# Patient Record
Sex: MALE | Race: BLACK OR AFRICAN AMERICAN | NOT HISPANIC OR LATINO | Employment: OTHER | URBAN - METROPOLITAN AREA
[De-identification: names, ages, dates, MRNs, and addresses within clinical notes are randomized per-mention and may not be internally consistent; named-entity substitution may affect disease eponyms.]

---

## 2017-01-01 ENCOUNTER — HOSPITAL ENCOUNTER (INPATIENT)
Facility: HOSPITAL | Age: 67
LOS: 6 days | DRG: 252 | End: 2017-12-06
Attending: EMERGENCY MEDICINE | Admitting: INTERNAL MEDICINE
Payer: COMMERCIAL

## 2017-01-01 ENCOUNTER — ANESTHESIA EVENT (OUTPATIENT)
Dept: SURGERY | Facility: HOSPITAL | Age: 67
DRG: 252 | End: 2017-01-01
Payer: COMMERCIAL

## 2017-01-01 ENCOUNTER — TELEPHONE (OUTPATIENT)
Dept: CARDIOLOGY | Facility: CLINIC | Age: 67
End: 2017-01-01

## 2017-01-01 ENCOUNTER — OFFICE VISIT (OUTPATIENT)
Dept: CARDIOLOGY | Facility: CLINIC | Age: 67
End: 2017-01-01
Payer: COMMERCIAL

## 2017-01-01 ENCOUNTER — ANESTHESIA (OUTPATIENT)
Dept: SURGERY | Facility: HOSPITAL | Age: 67
DRG: 252 | End: 2017-01-01
Payer: COMMERCIAL

## 2017-01-01 ENCOUNTER — HOSPITAL ENCOUNTER (OUTPATIENT)
Dept: RADIOLOGY | Facility: HOSPITAL | Age: 67
Discharge: HOME | DRG: 252 | End: 2017-11-28
Attending: INTERNAL MEDICINE
Payer: MEDICARE

## 2017-01-01 ENCOUNTER — OFFICE VISIT (OUTPATIENT)
Dept: CARDIOLOGY | Facility: CLINIC | Age: 67
End: 2017-01-01
Payer: MEDICARE

## 2017-01-01 ENCOUNTER — ANESTHESIA EVENT (OUTPATIENT)
Dept: INTENSIVE CARE | Facility: HOSPITAL | Age: 67
DRG: 252 | End: 2017-01-01
Payer: COMMERCIAL

## 2017-01-01 ENCOUNTER — ANESTHESIA (OUTPATIENT)
Dept: INTENSIVE CARE | Facility: HOSPITAL | Age: 67
DRG: 252 | End: 2017-01-01
Payer: COMMERCIAL

## 2017-01-01 ENCOUNTER — HOSPITAL ENCOUNTER (INPATIENT)
Facility: HOSPITAL | Age: 67
LOS: 2 days | Discharge: HOME | DRG: 250 | End: 2017-03-09
Attending: HOSPITALIST | Admitting: HOSPITALIST
Payer: COMMERCIAL

## 2017-01-01 ENCOUNTER — HOSPITAL ENCOUNTER (OUTPATIENT)
Facility: HOSPITAL | Age: 67
Discharge: HOME | End: 2017-07-18
Attending: EMERGENCY MEDICINE | Admitting: INTERNAL MEDICINE
Payer: MEDICARE

## 2017-01-01 ENCOUNTER — HOSPITAL ENCOUNTER (OUTPATIENT)
Dept: RADIOLOGY | Facility: HOSPITAL | Age: 67
Discharge: HOME | DRG: 252 | End: 2017-11-28
Attending: INTERNAL MEDICINE
Payer: COMMERCIAL

## 2017-01-01 ENCOUNTER — HOSPITAL ENCOUNTER (INPATIENT)
Facility: HOSPITAL | Age: 67
LOS: 1 days | Discharge: HOME | DRG: 291 | End: 2017-04-19
Attending: INTERNAL MEDICINE | Admitting: INTERNAL MEDICINE
Payer: MEDICARE

## 2017-01-01 ENCOUNTER — HOSPITAL ENCOUNTER (INPATIENT)
Facility: HOSPITAL | Age: 67
LOS: 3 days | Discharge: HOME HEALTH | DRG: 291 | End: 2017-09-17
Attending: EMERGENCY MEDICINE | Admitting: FAMILY MEDICINE
Payer: MEDICARE

## 2017-01-01 VITALS
OXYGEN SATURATION: 100 % | BODY MASS INDEX: 23.7 KG/M2 | SYSTOLIC BLOOD PRESSURE: 170 MMHG | RESPIRATION RATE: 18 BRPM | DIASTOLIC BLOOD PRESSURE: 89 MMHG | TEMPERATURE: 98 F | HEIGHT: 71 IN | WEIGHT: 169.31 LBS | HEART RATE: 63 BPM

## 2017-01-01 VITALS
SYSTOLIC BLOOD PRESSURE: 170 MMHG | WEIGHT: 170 LBS | DIASTOLIC BLOOD PRESSURE: 70 MMHG | BODY MASS INDEX: 23.71 KG/M2 | HEART RATE: 94 BPM

## 2017-01-01 VITALS
DIASTOLIC BLOOD PRESSURE: 35 MMHG | WEIGHT: 154.31 LBS | HEART RATE: 48 BPM | RESPIRATION RATE: 19 BRPM | TEMPERATURE: 98 F | HEIGHT: 71 IN | OXYGEN SATURATION: 100 % | SYSTOLIC BLOOD PRESSURE: 60 MMHG | BODY MASS INDEX: 21.6 KG/M2

## 2017-01-01 VITALS
DIASTOLIC BLOOD PRESSURE: 58 MMHG | OXYGEN SATURATION: 96 % | HEART RATE: 62 BPM | SYSTOLIC BLOOD PRESSURE: 129 MMHG | HEIGHT: 71 IN | BODY MASS INDEX: 22.7 KG/M2 | RESPIRATION RATE: 20 BRPM | TEMPERATURE: 99 F | WEIGHT: 162.13 LBS

## 2017-01-01 VITALS
WEIGHT: 167.13 LBS | DIASTOLIC BLOOD PRESSURE: 54 MMHG | TEMPERATURE: 99 F | SYSTOLIC BLOOD PRESSURE: 99 MMHG | BODY MASS INDEX: 23.4 KG/M2 | HEART RATE: 58 BPM | RESPIRATION RATE: 18 BRPM | OXYGEN SATURATION: 93 % | HEIGHT: 71 IN

## 2017-01-01 VITALS
BODY MASS INDEX: 23.4 KG/M2 | HEART RATE: 58 BPM | HEIGHT: 71 IN | OXYGEN SATURATION: 98 % | DIASTOLIC BLOOD PRESSURE: 50 MMHG | SYSTOLIC BLOOD PRESSURE: 128 MMHG | TEMPERATURE: 98 F | RESPIRATION RATE: 16 BRPM | WEIGHT: 167.13 LBS

## 2017-01-01 VITALS
DIASTOLIC BLOOD PRESSURE: 85 MMHG | SYSTOLIC BLOOD PRESSURE: 130 MMHG | OXYGEN SATURATION: 99 % | BODY MASS INDEX: 21.98 KG/M2 | WEIGHT: 157 LBS | HEART RATE: 84 BPM | HEIGHT: 71 IN

## 2017-01-01 DIAGNOSIS — E87.70 VOLUME OVERLOAD: ICD-10-CM

## 2017-01-01 DIAGNOSIS — E87.5 SERUM POTASSIUM ELEVATED: ICD-10-CM

## 2017-01-01 DIAGNOSIS — T82.49XD CLOTTED DIALYSIS ACCESS, SUBSEQUENT ENCOUNTER: ICD-10-CM

## 2017-01-01 DIAGNOSIS — E87.5 HYPERKALEMIA: Primary | ICD-10-CM

## 2017-01-01 DIAGNOSIS — Z91.199 PERSONAL HISTORY OF NONCOMPLIANCE WITH MEDICAL TREATMENT, PRESENTING HAZARDS TO HEALTH: ICD-10-CM

## 2017-01-01 DIAGNOSIS — I73.9 PAD (PERIPHERAL ARTERY DISEASE): ICD-10-CM

## 2017-01-01 DIAGNOSIS — Z99.2 ESRD (END STAGE RENAL DISEASE) ON DIALYSIS: ICD-10-CM

## 2017-01-01 DIAGNOSIS — D63.8 ANEMIA IN OTHER CHRONIC DISEASES CLASSIFIED ELSEWHERE: ICD-10-CM

## 2017-01-01 DIAGNOSIS — E11.29 DM (DIABETES MELLITUS) TYPE II CONTROLLED WITH RENAL MANIFESTATION: ICD-10-CM

## 2017-01-01 DIAGNOSIS — R53.1 WEAKNESS: ICD-10-CM

## 2017-01-01 DIAGNOSIS — I27.20 PULMONARY HYPERTENSION: ICD-10-CM

## 2017-01-01 DIAGNOSIS — Z99.2 ESRD ON HEMODIALYSIS: ICD-10-CM

## 2017-01-01 DIAGNOSIS — E07.9 THYROID DISEASE: ICD-10-CM

## 2017-01-01 DIAGNOSIS — I25.10 CORONARY ARTERY DISEASE INVOLVING NATIVE CORONARY ARTERY OF NATIVE HEART WITHOUT ANGINA PECTORIS: ICD-10-CM

## 2017-01-01 DIAGNOSIS — Z99.2 END STAGE RENAL DISEASE ON DIALYSIS: ICD-10-CM

## 2017-01-01 DIAGNOSIS — I67.9 CEREBROVASCULAR DISEASE: ICD-10-CM

## 2017-01-01 DIAGNOSIS — N18.6 ESRD ON HEMODIALYSIS: ICD-10-CM

## 2017-01-01 DIAGNOSIS — I16.1 HYPERTENSIVE EMERGENCY: ICD-10-CM

## 2017-01-01 DIAGNOSIS — I34.0 MITRAL VALVE INSUFFICIENCY, UNSPECIFIED ETIOLOGY: Primary | ICD-10-CM

## 2017-01-01 DIAGNOSIS — I10 ESSENTIAL HYPERTENSION: ICD-10-CM

## 2017-01-01 DIAGNOSIS — N18.6 ESRD (END STAGE RENAL DISEASE) ON DIALYSIS: ICD-10-CM

## 2017-01-01 DIAGNOSIS — I34.0 NON-RHEUMATIC MITRAL REGURGITATION: ICD-10-CM

## 2017-01-01 DIAGNOSIS — I21.4 NSTEMI (NON-ST ELEVATED MYOCARDIAL INFARCTION): ICD-10-CM

## 2017-01-01 DIAGNOSIS — E78.5 HYPERLIPIDEMIA LDL GOAL <70: ICD-10-CM

## 2017-01-01 DIAGNOSIS — R73.9 HYPERGLYCEMIA WITHOUT KETOSIS: ICD-10-CM

## 2017-01-01 DIAGNOSIS — Z95.2 MITRAL VALVE REPLACED: ICD-10-CM

## 2017-01-01 DIAGNOSIS — M53.3 TAIL BONE PAIN: ICD-10-CM

## 2017-01-01 DIAGNOSIS — Z01.818 PREOP EXAMINATION: Primary | ICD-10-CM

## 2017-01-01 DIAGNOSIS — T82.49XA CLOTTED DIALYSIS ACCESS: ICD-10-CM

## 2017-01-01 DIAGNOSIS — Z91.199 PERSONAL HISTORY OF NONCOMPLIANCE WITH MEDICAL TREATMENT, PRESENTING HAZARDS TO HEALTH: Primary | ICD-10-CM

## 2017-01-01 DIAGNOSIS — I25.810 CORONARY ARTERY DISEASE INVOLVING CORONARY BYPASS GRAFT OF NATIVE HEART WITHOUT ANGINA PECTORIS: ICD-10-CM

## 2017-01-01 DIAGNOSIS — N18.6 ESRD ON HEMODIALYSIS: Primary | ICD-10-CM

## 2017-01-01 DIAGNOSIS — E87.70 HYPERVOLEMIA, UNSPECIFIED HYPERVOLEMIA TYPE: ICD-10-CM

## 2017-01-01 DIAGNOSIS — I50.23 ACUTE ON CHRONIC SYSTOLIC HEART FAILURE: ICD-10-CM

## 2017-01-01 DIAGNOSIS — I25.810 CORONARY ARTERY DISEASE INVOLVING CORONARY BYPASS GRAFT OF NATIVE HEART WITHOUT ANGINA PECTORIS: Primary | ICD-10-CM

## 2017-01-01 DIAGNOSIS — S80.812A ABRASION OF LEFT LOWER EXTREMITY, INITIAL ENCOUNTER: ICD-10-CM

## 2017-01-01 DIAGNOSIS — E03.9 ACQUIRED HYPOTHYROIDISM: ICD-10-CM

## 2017-01-01 DIAGNOSIS — N18.6 END STAGE RENAL DISEASE ON DIALYSIS: ICD-10-CM

## 2017-01-01 DIAGNOSIS — I50.9 CHF (CONGESTIVE HEART FAILURE): ICD-10-CM

## 2017-01-01 DIAGNOSIS — R50.9 FEBRILE ILLNESS: ICD-10-CM

## 2017-01-01 DIAGNOSIS — T82.41XA HEMODIALYSIS CATHETER MALFUNCTION, INITIAL ENCOUNTER: ICD-10-CM

## 2017-01-01 DIAGNOSIS — R79.89 ELEVATED TROPONIN: ICD-10-CM

## 2017-01-01 DIAGNOSIS — E87.70 HYPERVOLEMIA, UNSPECIFIED HYPERVOLEMIA TYPE: Primary | ICD-10-CM

## 2017-01-01 DIAGNOSIS — E87.70 FLUID OVERLOAD: ICD-10-CM

## 2017-01-01 DIAGNOSIS — R60.9 SWELLING: ICD-10-CM

## 2017-01-01 DIAGNOSIS — I25.10 CAD (CORONARY ARTERY DISEASE): ICD-10-CM

## 2017-01-01 DIAGNOSIS — Z99.2 ESRD ON HEMODIALYSIS: Primary | ICD-10-CM

## 2017-01-01 DIAGNOSIS — Z71.89 GOALS OF CARE, COUNSELING/DISCUSSION: ICD-10-CM

## 2017-01-01 DIAGNOSIS — R00.0 TACHYCARDIA: ICD-10-CM

## 2017-01-01 DIAGNOSIS — I15.0 RENOVASCULAR HYPERTENSION: ICD-10-CM

## 2017-01-01 DIAGNOSIS — T82.49XA CLOTTED DIALYSIS ACCESS, INITIAL ENCOUNTER: ICD-10-CM

## 2017-01-01 DIAGNOSIS — E87.5 HYPERKALEMIA: ICD-10-CM

## 2017-01-01 DIAGNOSIS — A41.59 SEPSIS DUE TO ENTEROBACTER: ICD-10-CM

## 2017-01-01 DIAGNOSIS — I25.10 CORONARY ARTERY DISEASE INVOLVING NATIVE CORONARY ARTERY OF NATIVE HEART WITHOUT ANGINA PECTORIS: Primary | ICD-10-CM

## 2017-01-01 DIAGNOSIS — I25.10 CORONARY ARTERY DISEASE, ANGINA PRESENCE UNSPECIFIED, UNSPECIFIED VESSEL OR LESION TYPE, UNSPECIFIED WHETHER NATIVE OR TRANSPLANTED HEART: ICD-10-CM

## 2017-01-01 DIAGNOSIS — R53.81 PHYSICAL DECONDITIONING: ICD-10-CM

## 2017-01-01 DIAGNOSIS — N18.9 ANEMIA IN CHRONIC KIDNEY DISEASE(285.21): ICD-10-CM

## 2017-01-01 DIAGNOSIS — I96 GANGRENE OF TOE: ICD-10-CM

## 2017-01-01 DIAGNOSIS — R73.9 HYPERGLYCEMIA: Primary | ICD-10-CM

## 2017-01-01 DIAGNOSIS — E87.1 HYPONATREMIA: ICD-10-CM

## 2017-01-01 DIAGNOSIS — E03.4 HYPOTHYROIDISM DUE TO ACQUIRED ATROPHY OF THYROID: ICD-10-CM

## 2017-01-01 DIAGNOSIS — R07.9 CHEST PAIN: ICD-10-CM

## 2017-01-01 DIAGNOSIS — R73.9 HYPERGLYCEMIA: ICD-10-CM

## 2017-01-01 DIAGNOSIS — Z51.5 PALLIATIVE CARE ENCOUNTER: ICD-10-CM

## 2017-01-01 DIAGNOSIS — N18.9 CRF (CHRONIC RENAL FAILURE), UNSPECIFIED STAGE: ICD-10-CM

## 2017-01-01 DIAGNOSIS — D63.1 ANEMIA IN CHRONIC KIDNEY DISEASE(285.21): ICD-10-CM

## 2017-01-01 DIAGNOSIS — S90.819A ABRASION FOOT/TOE: ICD-10-CM

## 2017-01-01 DIAGNOSIS — R40.4: ICD-10-CM

## 2017-01-01 DIAGNOSIS — R41.82 ALTERED MENTAL STATUS: ICD-10-CM

## 2017-01-01 DIAGNOSIS — I95.9 HYPOTENSION, UNSPECIFIED HYPOTENSION TYPE: ICD-10-CM

## 2017-01-01 LAB
ACANTHOCYTES BLD QL SMEAR: PRESENT
ACANTHOCYTES BLD QL SMEAR: PRESENT
ALBUMIN SERPL BCP-MCNC: 2.4 G/DL
ALBUMIN SERPL BCP-MCNC: 2.4 G/DL
ALBUMIN SERPL BCP-MCNC: 2.5 G/DL
ALBUMIN SERPL BCP-MCNC: 2.5 G/DL
ALBUMIN SERPL BCP-MCNC: 2.6 G/DL
ALBUMIN SERPL BCP-MCNC: 2.6 G/DL
ALBUMIN SERPL BCP-MCNC: 2.7 G/DL
ALBUMIN SERPL BCP-MCNC: 2.8 G/DL
ALBUMIN SERPL BCP-MCNC: 2.9 G/DL
ALBUMIN SERPL BCP-MCNC: 3 G/DL
ALBUMIN SERPL BCP-MCNC: 3 G/DL
ALBUMIN SERPL BCP-MCNC: 3.1 G/DL
ALBUMIN SERPL BCP-MCNC: 3.2 G/DL
ALBUMIN SERPL BCP-MCNC: 3.3 G/DL
ALBUMIN SERPL BCP-MCNC: 3.3 G/DL
ALBUMIN SERPL BCP-MCNC: 3.4 G/DL
ALLENS TEST: ABNORMAL
ALLENS TEST: ABNORMAL
ALP SERPL-CCNC: 119 U/L
ALP SERPL-CCNC: 134 U/L
ALP SERPL-CCNC: 135 U/L
ALP SERPL-CCNC: 147 U/L
ALP SERPL-CCNC: 150 U/L
ALP SERPL-CCNC: 166 U/L
ALP SERPL-CCNC: 189 U/L
ALP SERPL-CCNC: 192 U/L
ALP SERPL-CCNC: 300 U/L
ALP SERPL-CCNC: 308 U/L
ALP SERPL-CCNC: 310 U/L
ALP SERPL-CCNC: 315 U/L
ALP SERPL-CCNC: 318 U/L
ALP SERPL-CCNC: 342 U/L
ALP SERPL-CCNC: 91 U/L
ALP SERPL-CCNC: 97 U/L
ALT SERPL W/O P-5'-P-CCNC: 124 U/L
ALT SERPL W/O P-5'-P-CCNC: 21 U/L
ALT SERPL W/O P-5'-P-CCNC: 21 U/L
ALT SERPL W/O P-5'-P-CCNC: 23 U/L
ALT SERPL W/O P-5'-P-CCNC: 24 U/L
ALT SERPL W/O P-5'-P-CCNC: 58 U/L
ALT SERPL W/O P-5'-P-CCNC: 6 U/L
ALT SERPL W/O P-5'-P-CCNC: 63 U/L
ALT SERPL W/O P-5'-P-CCNC: 7 U/L
ALT SERPL W/O P-5'-P-CCNC: 70 U/L
ALT SERPL W/O P-5'-P-CCNC: 77 U/L
ALT SERPL W/O P-5'-P-CCNC: 81 U/L
ALT SERPL W/O P-5'-P-CCNC: 88 U/L
ALT SERPL W/O P-5'-P-CCNC: 92 U/L
ALT SERPL W/O P-5'-P-CCNC: <5 U/L
ALT SERPL W/O P-5'-P-CCNC: <5 U/L
AMPHET+METHAMPHET UR QL: NEGATIVE
AMPHET+METHAMPHET UR QL: NEGATIVE
ANION GAP SERPL CALC-SCNC: 10 MMOL/L
ANION GAP SERPL CALC-SCNC: 11 MMOL/L
ANION GAP SERPL CALC-SCNC: 11 MMOL/L
ANION GAP SERPL CALC-SCNC: 12 MMOL/L
ANION GAP SERPL CALC-SCNC: 12 MMOL/L
ANION GAP SERPL CALC-SCNC: 13 MMOL/L
ANION GAP SERPL CALC-SCNC: 14 MMOL/L
ANION GAP SERPL CALC-SCNC: 15 MMOL/L
ANION GAP SERPL CALC-SCNC: 16 MMOL/L
ANION GAP SERPL CALC-SCNC: 19 MMOL/L
ANION GAP SERPL CALC-SCNC: 21 MMOL/L
ANION GAP SERPL CALC-SCNC: 6 MMOL/L
ANION GAP SERPL CALC-SCNC: 7 MMOL/L
ANION GAP SERPL CALC-SCNC: 8 MMOL/L
ANION GAP SERPL CALC-SCNC: 8 MMOL/L
ANION GAP SERPL CALC-SCNC: 9 MMOL/L
ANISOCYTOSIS BLD QL SMEAR: ABNORMAL
ANISOCYTOSIS BLD QL SMEAR: ABNORMAL
ANISOCYTOSIS BLD QL SMEAR: SLIGHT
APAP SERPL-MCNC: <3 UG/ML
AST SERPL-CCNC: 13 U/L
AST SERPL-CCNC: 15 U/L
AST SERPL-CCNC: 18 U/L
AST SERPL-CCNC: 19 U/L
AST SERPL-CCNC: 21 U/L
AST SERPL-CCNC: 22 U/L
AST SERPL-CCNC: 24 U/L
AST SERPL-CCNC: 24 U/L
AST SERPL-CCNC: 28 U/L
AST SERPL-CCNC: 44 U/L
AST SERPL-CCNC: 45 U/L
AST SERPL-CCNC: 579 U/L
AST SERPL-CCNC: 60 U/L
AST SERPL-CCNC: 63 U/L
AST SERPL-CCNC: 70 U/L
AST SERPL-CCNC: 90 U/L
B-OH-BUTYR BLD STRIP-SCNC: 0.1 MMOL/L
B-OH-BUTYR BLD STRIP-SCNC: 0.3 MMOL/L
BACTERIA #/AREA URNS HPF: ABNORMAL /HPF
BACTERIA #/AREA URNS HPF: NORMAL /HPF
BACTERIA #/AREA URNS HPF: NORMAL /HPF
BARBITURATES UR QL SCN>200 NG/ML: NEGATIVE
BARBITURATES UR QL SCN>200 NG/ML: NEGATIVE
BASOPHILS # BLD AUTO: 0.01 K/UL
BASOPHILS # BLD AUTO: 0.01 K/UL
BASOPHILS # BLD AUTO: 0.02 K/UL
BASOPHILS # BLD AUTO: 0.03 K/UL
BASOPHILS # BLD AUTO: 0.03 K/UL
BASOPHILS # BLD AUTO: 0.04 K/UL
BASOPHILS # BLD AUTO: 0.05 K/UL
BASOPHILS NFR BLD: 0 %
BASOPHILS NFR BLD: 0 %
BASOPHILS NFR BLD: 0.1 %
BASOPHILS NFR BLD: 0.2 %
BASOPHILS NFR BLD: 0.3 %
BASOPHILS NFR BLD: 0.5 %
BASOPHILS NFR BLD: 0.5 %
BASOPHILS NFR BLD: 0.6 %
BASOPHILS NFR BLD: 0.9 %
BASOPHILS NFR BLD: 1 %
BENZODIAZ UR QL SCN>200 NG/ML: NEGATIVE
BENZODIAZ UR QL SCN>200 NG/ML: NEGATIVE
BILIRUB SERPL-MCNC: 0.5 MG/DL
BILIRUB SERPL-MCNC: 0.6 MG/DL
BILIRUB SERPL-MCNC: 0.7 MG/DL
BILIRUB SERPL-MCNC: 0.9 MG/DL
BILIRUB SERPL-MCNC: 1.4 MG/DL
BILIRUB UR QL STRIP: NEGATIVE
BNP SERPL-MCNC: >4900 PG/ML
BUN SERPL-MCNC: 10 MG/DL
BUN SERPL-MCNC: 10 MG/DL
BUN SERPL-MCNC: 11 MG/DL
BUN SERPL-MCNC: 14 MG/DL
BUN SERPL-MCNC: 19 MG/DL
BUN SERPL-MCNC: 26 MG/DL
BUN SERPL-MCNC: 30 MG/DL
BUN SERPL-MCNC: 32 MG/DL
BUN SERPL-MCNC: 33 MG/DL
BUN SERPL-MCNC: 36 MG/DL
BUN SERPL-MCNC: 36 MG/DL
BUN SERPL-MCNC: 37 MG/DL
BUN SERPL-MCNC: 40 MG/DL
BUN SERPL-MCNC: 40 MG/DL
BUN SERPL-MCNC: 41 MG/DL
BUN SERPL-MCNC: 41 MG/DL
BUN SERPL-MCNC: 44 MG/DL
BUN SERPL-MCNC: 47 MG/DL
BUN SERPL-MCNC: 48 MG/DL
BUN SERPL-MCNC: 49 MG/DL
BUN SERPL-MCNC: 52 MG/DL
BUN SERPL-MCNC: 54 MG/DL
BUN SERPL-MCNC: 59 MG/DL
BUN SERPL-MCNC: 66 MG/DL
BUN SERPL-MCNC: 74 MG/DL
BUN SERPL-MCNC: 79 MG/DL
BUN SERPL-MCNC: 79 MG/DL
BUN SERPL-MCNC: 80 MG/DL
BUN SERPL-MCNC: 80 MG/DL
BUN SERPL-MCNC: 82 MG/DL
BUN SERPL-MCNC: 9 MG/DL
BUN SERPL-MCNC: 9 MG/DL
BUN SERPL-MCNC: 91 MG/DL
BURR CELLS BLD QL SMEAR: ABNORMAL
BZE UR QL SCN: NEGATIVE
BZE UR QL SCN: NEGATIVE
C DIFF GDH STL QL: NEGATIVE
C DIFF TOX A+B STL QL IA: NEGATIVE
CALCIUM SERPL-MCNC: 6.8 MG/DL
CALCIUM SERPL-MCNC: 7 MG/DL
CALCIUM SERPL-MCNC: 7 MG/DL
CALCIUM SERPL-MCNC: 7.4 MG/DL
CALCIUM SERPL-MCNC: 7.4 MG/DL
CALCIUM SERPL-MCNC: 7.5 MG/DL
CALCIUM SERPL-MCNC: 7.5 MG/DL
CALCIUM SERPL-MCNC: 7.6 MG/DL
CALCIUM SERPL-MCNC: 7.6 MG/DL
CALCIUM SERPL-MCNC: 7.7 MG/DL
CALCIUM SERPL-MCNC: 7.7 MG/DL
CALCIUM SERPL-MCNC: 7.8 MG/DL
CALCIUM SERPL-MCNC: 7.9 MG/DL
CALCIUM SERPL-MCNC: 8.1 MG/DL
CALCIUM SERPL-MCNC: 8.2 MG/DL
CALCIUM SERPL-MCNC: 8.3 MG/DL
CALCIUM SERPL-MCNC: 8.4 MG/DL
CALCIUM SERPL-MCNC: 8.5 MG/DL
CALCIUM SERPL-MCNC: 8.6 MG/DL
CALCIUM SERPL-MCNC: 8.7 MG/DL
CALCIUM SERPL-MCNC: 9 MG/DL
CALCIUM SERPL-MCNC: 9.1 MG/DL
CALCIUM SERPL-MCNC: 9.3 MG/DL
CANNABINOIDS UR QL SCN: NEGATIVE
CANNABINOIDS UR QL SCN: NEGATIVE
CHLORIDE SERPL-SCNC: 100 MMOL/L
CHLORIDE SERPL-SCNC: 100 MMOL/L
CHLORIDE SERPL-SCNC: 101 MMOL/L
CHLORIDE SERPL-SCNC: 102 MMOL/L
CHLORIDE SERPL-SCNC: 103 MMOL/L
CHLORIDE SERPL-SCNC: 103 MMOL/L
CHLORIDE SERPL-SCNC: 104 MMOL/L
CHLORIDE SERPL-SCNC: 104 MMOL/L
CHLORIDE SERPL-SCNC: 105 MMOL/L
CHLORIDE SERPL-SCNC: 106 MMOL/L
CHLORIDE SERPL-SCNC: 107 MMOL/L
CHLORIDE SERPL-SCNC: 89 MMOL/L
CHLORIDE SERPL-SCNC: 89 MMOL/L
CHLORIDE SERPL-SCNC: 92 MMOL/L
CHLORIDE SERPL-SCNC: 92 MMOL/L
CHLORIDE SERPL-SCNC: 93 MMOL/L
CHLORIDE SERPL-SCNC: 94 MMOL/L
CHLORIDE SERPL-SCNC: 95 MMOL/L
CHLORIDE SERPL-SCNC: 95 MMOL/L
CHLORIDE SERPL-SCNC: 96 MMOL/L
CHLORIDE SERPL-SCNC: 97 MMOL/L
CHLORIDE SERPL-SCNC: 98 MMOL/L
CHLORIDE SERPL-SCNC: 99 MMOL/L
CHOLEST SERPL-MCNC: 59 MG/DL
CHOLEST/HDLC SERPL: 1.9 {RATIO}
CHOLEST/HDLC SERPL: 1.9 {RATIO}
CK MB SERPL-MCNC: 5.1 NG/ML
CK MB SERPL-RTO: 1.5 %
CK SERPL-CCNC: 349 U/L
CK SERPL-CCNC: 349 U/L
CLARITY UR: CLEAR
CO2 SERPL-SCNC: 10 MMOL/L
CO2 SERPL-SCNC: 14 MMOL/L
CO2 SERPL-SCNC: 15 MMOL/L
CO2 SERPL-SCNC: 15 MMOL/L
CO2 SERPL-SCNC: 16 MMOL/L
CO2 SERPL-SCNC: 16 MMOL/L
CO2 SERPL-SCNC: 17 MMOL/L
CO2 SERPL-SCNC: 18 MMOL/L
CO2 SERPL-SCNC: 18 MMOL/L
CO2 SERPL-SCNC: 19 MMOL/L
CO2 SERPL-SCNC: 20 MMOL/L
CO2 SERPL-SCNC: 21 MMOL/L
CO2 SERPL-SCNC: 21 MMOL/L
CO2 SERPL-SCNC: 22 MMOL/L
CO2 SERPL-SCNC: 23 MMOL/L
CO2 SERPL-SCNC: 23 MMOL/L
CO2 SERPL-SCNC: 24 MMOL/L
CO2 SERPL-SCNC: 25 MMOL/L
CO2 SERPL-SCNC: 26 MMOL/L
CO2 SERPL-SCNC: 27 MMOL/L
CO2 SERPL-SCNC: 28 MMOL/L
CO2 SERPL-SCNC: 28 MMOL/L
COLOR UR: YELLOW
CORONARY STENOSIS: ABNORMAL
CORTIS SERPL-MCNC: 38 UG/DL
CREAT SERPL-MCNC: 1.6 MG/DL
CREAT SERPL-MCNC: 1.7 MG/DL
CREAT SERPL-MCNC: 1.8 MG/DL
CREAT SERPL-MCNC: 1.9 MG/DL
CREAT SERPL-MCNC: 2 MG/DL
CREAT SERPL-MCNC: 2.3 MG/DL
CREAT SERPL-MCNC: 2.9 MG/DL
CREAT SERPL-MCNC: 3 MG/DL
CREAT SERPL-MCNC: 3 MG/DL
CREAT SERPL-MCNC: 3.6 MG/DL
CREAT SERPL-MCNC: 4.1 MG/DL
CREAT SERPL-MCNC: 4.2 MG/DL
CREAT SERPL-MCNC: 4.2 MG/DL
CREAT SERPL-MCNC: 4.3 MG/DL
CREAT SERPL-MCNC: 4.3 MG/DL
CREAT SERPL-MCNC: 4.4 MG/DL
CREAT SERPL-MCNC: 4.4 MG/DL
CREAT SERPL-MCNC: 4.6 MG/DL
CREAT SERPL-MCNC: 5 MG/DL
CREAT SERPL-MCNC: 5.2 MG/DL
CREAT SERPL-MCNC: 5.3 MG/DL
CREAT SERPL-MCNC: 5.5 MG/DL
CREAT SERPL-MCNC: 5.7 MG/DL
CREAT SERPL-MCNC: 6 MG/DL
CREAT SERPL-MCNC: 7.4 MG/DL
CREAT SERPL-MCNC: 7.4 MG/DL
CREAT SERPL-MCNC: 7.6 MG/DL
CREAT SERPL-MCNC: 8.2 MG/DL
CREAT UR-MCNC: 190.3 MG/DL
CREAT UR-MCNC: 63 MG/DL
DACRYOCYTES BLD QL SMEAR: ABNORMAL
DELSYS: ABNORMAL
DIFFERENTIAL METHOD: ABNORMAL
EOSINOPHIL # BLD AUTO: 0 K/UL
EOSINOPHIL # BLD AUTO: 0 K/UL
EOSINOPHIL # BLD AUTO: 0.1 K/UL
EOSINOPHIL # BLD AUTO: 0.1 K/UL
EOSINOPHIL # BLD AUTO: 0.2 K/UL
EOSINOPHIL # BLD AUTO: 0.3 K/UL
EOSINOPHIL # BLD AUTO: 0.4 K/UL
EOSINOPHIL NFR BLD: 0 %
EOSINOPHIL NFR BLD: 0.1 %
EOSINOPHIL NFR BLD: 0.7 %
EOSINOPHIL NFR BLD: 1.2 %
EOSINOPHIL NFR BLD: 1.8 %
EOSINOPHIL NFR BLD: 1.9 %
EOSINOPHIL NFR BLD: 2.7 %
EOSINOPHIL NFR BLD: 2.7 %
EOSINOPHIL NFR BLD: 2.8 %
EOSINOPHIL NFR BLD: 3.3 %
EOSINOPHIL NFR BLD: 3.4 %
EOSINOPHIL NFR BLD: 4 %
EOSINOPHIL NFR BLD: 4.3 %
EOSINOPHIL NFR BLD: 4.5 %
EOSINOPHIL NFR BLD: 5.4 %
ERYTHROCYTE [DISTWIDTH] IN BLOOD BY AUTOMATED COUNT: 15.4 %
ERYTHROCYTE [DISTWIDTH] IN BLOOD BY AUTOMATED COUNT: 15.4 %
ERYTHROCYTE [DISTWIDTH] IN BLOOD BY AUTOMATED COUNT: 15.5 %
ERYTHROCYTE [DISTWIDTH] IN BLOOD BY AUTOMATED COUNT: 15.9 %
ERYTHROCYTE [DISTWIDTH] IN BLOOD BY AUTOMATED COUNT: 17.1 %
ERYTHROCYTE [DISTWIDTH] IN BLOOD BY AUTOMATED COUNT: 17.1 %
ERYTHROCYTE [DISTWIDTH] IN BLOOD BY AUTOMATED COUNT: 17.2 %
ERYTHROCYTE [DISTWIDTH] IN BLOOD BY AUTOMATED COUNT: 17.3 %
ERYTHROCYTE [DISTWIDTH] IN BLOOD BY AUTOMATED COUNT: 17.3 %
ERYTHROCYTE [DISTWIDTH] IN BLOOD BY AUTOMATED COUNT: 17.6 %
ERYTHROCYTE [DISTWIDTH] IN BLOOD BY AUTOMATED COUNT: 17.6 %
ERYTHROCYTE [DISTWIDTH] IN BLOOD BY AUTOMATED COUNT: 17.9 %
ERYTHROCYTE [DISTWIDTH] IN BLOOD BY AUTOMATED COUNT: 18 %
ERYTHROCYTE [DISTWIDTH] IN BLOOD BY AUTOMATED COUNT: 18.3 %
ERYTHROCYTE [DISTWIDTH] IN BLOOD BY AUTOMATED COUNT: 18.6 %
ERYTHROCYTE [DISTWIDTH] IN BLOOD BY AUTOMATED COUNT: 18.8 %
ERYTHROCYTE [DISTWIDTH] IN BLOOD BY AUTOMATED COUNT: 19.2 %
EST. GFR  (AFRICAN AMERICAN): 10 ML/MIN/1.73 M^2
EST. GFR  (AFRICAN AMERICAN): 11 ML/MIN/1.73 M^2
EST. GFR  (AFRICAN AMERICAN): 12 ML/MIN/1.73 M^2
EST. GFR  (AFRICAN AMERICAN): 13 ML/MIN/1.73 M^2
EST. GFR  (AFRICAN AMERICAN): 14 ML/MIN/1.73 M^2
EST. GFR  (AFRICAN AMERICAN): 15 ML/MIN/1.73 M^2
EST. GFR  (AFRICAN AMERICAN): 15 ML/MIN/1.73 M^2
EST. GFR  (AFRICAN AMERICAN): 16 ML/MIN/1.73 M^2
EST. GFR  (AFRICAN AMERICAN): 17 ML/MIN/1.73 M^2
EST. GFR  (AFRICAN AMERICAN): 19 ML/MIN/1.73 M^2
EST. GFR  (AFRICAN AMERICAN): 24 ML/MIN/1.73 M^2
EST. GFR  (AFRICAN AMERICAN): 24 ML/MIN/1.73 M^2
EST. GFR  (AFRICAN AMERICAN): 25 ML/MIN/1.73 M^2
EST. GFR  (AFRICAN AMERICAN): 33 ML/MIN/1.73 M^2
EST. GFR  (AFRICAN AMERICAN): 39 ML/MIN/1.73 M^2
EST. GFR  (AFRICAN AMERICAN): 42 ML/MIN/1.73 M^2
EST. GFR  (AFRICAN AMERICAN): 45 ML/MIN/1.73 M^2
EST. GFR  (AFRICAN AMERICAN): 48 ML/MIN/1.73 M^2
EST. GFR  (AFRICAN AMERICAN): 51 ML/MIN/1.73 M^2
EST. GFR  (AFRICAN AMERICAN): 7 ML/MIN/1.73 M^2
EST. GFR  (AFRICAN AMERICAN): 8 ML/MIN/1.73 M^2
EST. GFR  (NON AFRICAN AMERICAN): 10 ML/MIN/1.73 M^2
EST. GFR  (NON AFRICAN AMERICAN): 11 ML/MIN/1.73 M^2
EST. GFR  (NON AFRICAN AMERICAN): 11 ML/MIN/1.73 M^2
EST. GFR  (NON AFRICAN AMERICAN): 12 ML/MIN/1.73 M^2
EST. GFR  (NON AFRICAN AMERICAN): 13 ML/MIN/1.73 M^2
EST. GFR  (NON AFRICAN AMERICAN): 14 ML/MIN/1.73 M^2
EST. GFR  (NON AFRICAN AMERICAN): 17 ML/MIN/1.73 M^2
EST. GFR  (NON AFRICAN AMERICAN): 21 ML/MIN/1.73 M^2
EST. GFR  (NON AFRICAN AMERICAN): 21 ML/MIN/1.73 M^2
EST. GFR  (NON AFRICAN AMERICAN): 22 ML/MIN/1.73 M^2
EST. GFR  (NON AFRICAN AMERICAN): 29 ML/MIN/1.73 M^2
EST. GFR  (NON AFRICAN AMERICAN): 34 ML/MIN/1.73 M^2
EST. GFR  (NON AFRICAN AMERICAN): 36 ML/MIN/1.73 M^2
EST. GFR  (NON AFRICAN AMERICAN): 39 ML/MIN/1.73 M^2
EST. GFR  (NON AFRICAN AMERICAN): 41 ML/MIN/1.73 M^2
EST. GFR  (NON AFRICAN AMERICAN): 45 ML/MIN/1.73 M^2
EST. GFR  (NON AFRICAN AMERICAN): 6 ML/MIN/1.73 M^2
EST. GFR  (NON AFRICAN AMERICAN): 7 ML/MIN/1.73 M^2
EST. GFR  (NON AFRICAN AMERICAN): 9 ML/MIN/1.73 M^2
ESTIMATED AVG GLUCOSE: 369 MG/DL
ESTIMATED AVG GLUCOSE: 375 MG/DL
ESTIMATED AVG GLUCOSE: ABNORMAL MG/DL
ESTIMATED AVG GLUCOSE: ABNORMAL MG/DL
ESTIMATED PA SYSTOLIC PRESSURE: 15.96
ESTIMATED PA SYSTOLIC PRESSURE: 88.38
FERRITIN SERPL-MCNC: 1866 NG/ML
FOLATE SERPL-MCNC: 6.8 NG/ML
GIANT PLATELETS BLD QL SMEAR: PRESENT
GLOBAL PERICARDIAL EFFUSION: ABNORMAL
GLUCOSE SERPL-MCNC: 106 MG/DL
GLUCOSE SERPL-MCNC: 120 MG/DL
GLUCOSE SERPL-MCNC: 122 MG/DL
GLUCOSE SERPL-MCNC: 128 MG/DL
GLUCOSE SERPL-MCNC: 130 MG/DL
GLUCOSE SERPL-MCNC: 134 MG/DL
GLUCOSE SERPL-MCNC: 139 MG/DL
GLUCOSE SERPL-MCNC: 143 MG/DL
GLUCOSE SERPL-MCNC: 157 MG/DL
GLUCOSE SERPL-MCNC: 168 MG/DL
GLUCOSE SERPL-MCNC: 169 MG/DL
GLUCOSE SERPL-MCNC: 173 MG/DL
GLUCOSE SERPL-MCNC: 177 MG/DL
GLUCOSE SERPL-MCNC: 179 MG/DL
GLUCOSE SERPL-MCNC: 180 MG/DL
GLUCOSE SERPL-MCNC: 183 MG/DL
GLUCOSE SERPL-MCNC: 184 MG/DL (ref 70–110)
GLUCOSE SERPL-MCNC: 187 MG/DL
GLUCOSE SERPL-MCNC: 187 MG/DL
GLUCOSE SERPL-MCNC: 189 MG/DL
GLUCOSE SERPL-MCNC: 190 MG/DL
GLUCOSE SERPL-MCNC: 192 MG/DL
GLUCOSE SERPL-MCNC: 192 MG/DL
GLUCOSE SERPL-MCNC: 210 MG/DL (ref 70–110)
GLUCOSE SERPL-MCNC: 233 MG/DL
GLUCOSE SERPL-MCNC: 237 MG/DL
GLUCOSE SERPL-MCNC: 239 MG/DL
GLUCOSE SERPL-MCNC: 247 MG/DL
GLUCOSE SERPL-MCNC: 257 MG/DL
GLUCOSE SERPL-MCNC: 289 MG/DL
GLUCOSE SERPL-MCNC: 292 MG/DL
GLUCOSE SERPL-MCNC: 315 MG/DL (ref 70–110)
GLUCOSE SERPL-MCNC: 330 MG/DL
GLUCOSE SERPL-MCNC: 416 MG/DL
GLUCOSE SERPL-MCNC: 501 MG/DL
GLUCOSE SERPL-MCNC: 559 MG/DL
GLUCOSE SERPL-MCNC: 618 MG/DL
GLUCOSE SERPL-MCNC: 91 MG/DL
GLUCOSE SERPL-MCNC: 93 MG/DL
GLUCOSE SERPL-MCNC: 949 MG/DL
GLUCOSE UR QL STRIP: ABNORMAL
HBA1C MFR BLD HPLC: 14.5 %
HBA1C MFR BLD HPLC: 14.7 %
HBA1C MFR BLD HPLC: >14 %
HBA1C MFR BLD HPLC: >14 %
HBV SURFACE AG SERPL QL IA: NEGATIVE
HCO3 UR-SCNC: 12.7 MMOL/L (ref 24–28)
HCO3 UR-SCNC: 14.6 MMOL/L (ref 24–28)
HCO3 UR-SCNC: 24.6 MMOL/L (ref 24–28)
HCO3 UR-SCNC: 28.3 MMOL/L (ref 24–28)
HCT VFR BLD AUTO: 26.7 %
HCT VFR BLD AUTO: 27.4 %
HCT VFR BLD AUTO: 27.7 %
HCT VFR BLD AUTO: 28.2 %
HCT VFR BLD AUTO: 29.1 %
HCT VFR BLD AUTO: 29.4 %
HCT VFR BLD AUTO: 29.8 %
HCT VFR BLD AUTO: 31.2 %
HCT VFR BLD AUTO: 32.2 %
HCT VFR BLD AUTO: 32.9 %
HCT VFR BLD AUTO: 34.3 %
HCT VFR BLD AUTO: 35 %
HCT VFR BLD AUTO: 35.5 %
HCT VFR BLD AUTO: 35.8 %
HCT VFR BLD AUTO: 36.1 %
HCT VFR BLD AUTO: 36.6 %
HCT VFR BLD AUTO: 38 %
HCT VFR BLD CALC: 31 %PCV (ref 36–54)
HCT VFR BLD CALC: 34 %PCV (ref 36–54)
HDL/CHOLESTEROL RATIO: 51.9 %
HDLC SERPL-MCNC: 31 MG/DL
HDLC SERPL-MCNC: 41 MG/DL
HDLC SERPL-MCNC: 79 MG/DL
HDLC SERPL: 52.5 %
HEP. B SURF AB, QUAL: POSITIVE
HEP. B SURF AB, QUANT.: 100 MIU/ML
HGB BLD-MCNC: 10.5 G/DL
HGB BLD-MCNC: 10.7 G/DL
HGB BLD-MCNC: 10.8 G/DL
HGB BLD-MCNC: 11 G/DL
HGB BLD-MCNC: 11.1 G/DL
HGB BLD-MCNC: 11.5 G/DL
HGB BLD-MCNC: 11.5 G/DL
HGB BLD-MCNC: 11.6 G/DL
HGB BLD-MCNC: 11.7 G/DL
HGB BLD-MCNC: 11.7 G/DL
HGB BLD-MCNC: 12 G/DL
HGB BLD-MCNC: 12.6 G/DL
HGB BLD-MCNC: 8.7 G/DL
HGB BLD-MCNC: 8.8 G/DL
HGB BLD-MCNC: 8.8 G/DL
HGB BLD-MCNC: 9.4 G/DL
HGB BLD-MCNC: 9.4 G/DL
HGB BLD-MCNC: 9.6 G/DL
HGB BLD-MCNC: 9.9 G/DL
HGB UR QL STRIP: ABNORMAL
HGB UR QL STRIP: ABNORMAL
HGB UR QL STRIP: NEGATIVE
HYALINE CASTS #/AREA URNS LPF: 0 /LPF
HYALINE CASTS #/AREA URNS LPF: 0 /LPF
HYALINE CASTS #/AREA URNS LPF: 3 /LPF
HYPOCHROMIA BLD QL SMEAR: ABNORMAL
INR PPP: 1.1
IRON SERPL-MCNC: 46 UG/DL
KETONES UR QL STRIP: ABNORMAL
KETONES UR QL STRIP: NEGATIVE
KETONES UR QL STRIP: NEGATIVE
LACTATE SERPL-SCNC: 1.7 MMOL/L
LACTATE SERPL-SCNC: 5.5 MMOL/L
LACTATE SERPL-SCNC: 8.6 MMOL/L
LDLC SERPL CALC-MCNC: 18.8 MG/DL
LDLC SERPL CALC-MCNC: 28.6 MG/DL
LEUKOCYTE ESTERASE UR QL STRIP: ABNORMAL
LEUKOCYTE ESTERASE UR QL STRIP: NEGATIVE
LEUKOCYTE ESTERASE UR QL STRIP: NEGATIVE
LYMPHOCYTES # BLD AUTO: 0.7 K/UL
LYMPHOCYTES # BLD AUTO: 0.8 K/UL
LYMPHOCYTES # BLD AUTO: 0.9 K/UL
LYMPHOCYTES # BLD AUTO: 1 K/UL
LYMPHOCYTES # BLD AUTO: 1.1 K/UL
LYMPHOCYTES # BLD AUTO: 1.1 K/UL
LYMPHOCYTES # BLD AUTO: 1.2 K/UL
LYMPHOCYTES # BLD AUTO: 1.2 K/UL
LYMPHOCYTES # BLD AUTO: 1.4 K/UL
LYMPHOCYTES # BLD AUTO: 1.5 K/UL
LYMPHOCYTES # BLD AUTO: 1.9 K/UL
LYMPHOCYTES NFR BLD: 10 %
LYMPHOCYTES NFR BLD: 11.6 %
LYMPHOCYTES NFR BLD: 11.9 %
LYMPHOCYTES NFR BLD: 12.2 %
LYMPHOCYTES NFR BLD: 12.4 %
LYMPHOCYTES NFR BLD: 12.7 %
LYMPHOCYTES NFR BLD: 15 %
LYMPHOCYTES NFR BLD: 16.1 %
LYMPHOCYTES NFR BLD: 18.1 %
LYMPHOCYTES NFR BLD: 21.8 %
LYMPHOCYTES NFR BLD: 23.4 %
LYMPHOCYTES NFR BLD: 26 %
LYMPHOCYTES NFR BLD: 26.4 %
LYMPHOCYTES NFR BLD: 26.7 %
LYMPHOCYTES NFR BLD: 3.4 %
LYMPHOCYTES NFR BLD: 8.8 %
LYMPHOCYTES NFR BLD: 9.9 %
MAGNESIUM SERPL-MCNC: 1.3 MG/DL
MAGNESIUM SERPL-MCNC: 1.4 MG/DL
MAGNESIUM SERPL-MCNC: 1.7 MG/DL
MAGNESIUM SERPL-MCNC: 1.8 MG/DL
MAGNESIUM SERPL-MCNC: 1.9 MG/DL
MAGNESIUM SERPL-MCNC: 2 MG/DL
MAGNESIUM SERPL-MCNC: 2 MG/DL
MAGNESIUM SERPL-MCNC: 2.1 MG/DL
MAGNESIUM SERPL-MCNC: 2.1 MG/DL
MAGNESIUM SERPL-MCNC: 2.2 MG/DL
MAGNESIUM SERPL-MCNC: 2.3 MG/DL
MAGNESIUM SERPL-MCNC: 2.4 MG/DL
MCH RBC QN AUTO: 24.7 PG
MCH RBC QN AUTO: 24.8 PG
MCH RBC QN AUTO: 24.8 PG
MCH RBC QN AUTO: 24.9 PG
MCH RBC QN AUTO: 25 PG
MCH RBC QN AUTO: 25.1 PG
MCH RBC QN AUTO: 25.1 PG
MCH RBC QN AUTO: 25.3 PG
MCH RBC QN AUTO: 25.4 PG
MCH RBC QN AUTO: 25.5 PG
MCH RBC QN AUTO: 25.5 PG
MCH RBC QN AUTO: 25.6 PG
MCH RBC QN AUTO: 25.7 PG
MCH RBC QN AUTO: 25.7 PG
MCH RBC QN AUTO: 25.8 PG
MCHC RBC AUTO-ENTMCNC: 30.8 %
MCHC RBC AUTO-ENTMCNC: 31.5 G/DL
MCHC RBC AUTO-ENTMCNC: 31.8 G/DL
MCHC RBC AUTO-ENTMCNC: 31.8 G/DL
MCHC RBC AUTO-ENTMCNC: 32.1 %
MCHC RBC AUTO-ENTMCNC: 32.3 G/DL
MCHC RBC AUTO-ENTMCNC: 32.4 G/DL
MCHC RBC AUTO-ENTMCNC: 32.6 %
MCHC RBC AUTO-ENTMCNC: 32.8 %
MCHC RBC AUTO-ENTMCNC: 33 G/DL
MCHC RBC AUTO-ENTMCNC: 33.1 G/DL
MCHC RBC AUTO-ENTMCNC: 33.7 G/DL
MCHC RBC AUTO-ENTMCNC: 34 G/DL
MCHC RBC AUTO-ENTMCNC: 34.3 G/DL
MCHC RBC AUTO-ENTMCNC: 34.4 G/DL
MCV RBC AUTO: 74 FL
MCV RBC AUTO: 75 FL
MCV RBC AUTO: 76 FL
MCV RBC AUTO: 76 FL
MCV RBC AUTO: 77 FL
MCV RBC AUTO: 78 FL
MCV RBC AUTO: 79 FL
MCV RBC AUTO: 79 FL
MCV RBC AUTO: 80 FL
MCV RBC AUTO: 81 FL
MCV RBC AUTO: 83 FL
METHADONE UR QL SCN>300 NG/ML: NEGATIVE
METHADONE UR QL SCN>300 NG/ML: NEGATIVE
MICROSCOPIC COMMENT: ABNORMAL
MICROSCOPIC COMMENT: NORMAL
MICROSCOPIC COMMENT: NORMAL
MITRAL VALVE REGURGITATION: ABNORMAL
MONOCYTES # BLD AUTO: 0.4 K/UL
MONOCYTES # BLD AUTO: 0.4 K/UL
MONOCYTES # BLD AUTO: 0.5 K/UL
MONOCYTES # BLD AUTO: 0.6 K/UL
MONOCYTES # BLD AUTO: 0.7 K/UL
MONOCYTES # BLD AUTO: 1 K/UL
MONOCYTES # BLD AUTO: 1 K/UL
MONOCYTES # BLD AUTO: 1.4 K/UL
MONOCYTES # BLD AUTO: 2.3 K/UL
MONOCYTES NFR BLD: 10.2 %
MONOCYTES NFR BLD: 11.6 %
MONOCYTES NFR BLD: 5.5 %
MONOCYTES NFR BLD: 6 %
MONOCYTES NFR BLD: 6.2 %
MONOCYTES NFR BLD: 6.5 %
MONOCYTES NFR BLD: 7.5 %
MONOCYTES NFR BLD: 7.7 %
MONOCYTES NFR BLD: 7.9 %
MONOCYTES NFR BLD: 8.3 %
MONOCYTES NFR BLD: 8.3 %
MONOCYTES NFR BLD: 9 %
MONOCYTES NFR BLD: 9.1 %
MONOCYTES NFR BLD: 9.2 %
MONOCYTES NFR BLD: 9.5 %
MONOCYTES NFR BLD: 9.6 %
MONOCYTES NFR BLD: 9.7 %
NEUTROPHILS # BLD AUTO: 12.5 K/UL
NEUTROPHILS # BLD AUTO: 26 K/UL
NEUTROPHILS # BLD AUTO: 3 K/UL
NEUTROPHILS # BLD AUTO: 3.1 K/UL
NEUTROPHILS # BLD AUTO: 4.1 K/UL
NEUTROPHILS # BLD AUTO: 4.2 K/UL
NEUTROPHILS # BLD AUTO: 4.5 K/UL
NEUTROPHILS # BLD AUTO: 4.6 K/UL
NEUTROPHILS # BLD AUTO: 4.7 K/UL
NEUTROPHILS # BLD AUTO: 5.6 K/UL
NEUTROPHILS # BLD AUTO: 5.9 K/UL
NEUTROPHILS # BLD AUTO: 6.9 K/UL
NEUTROPHILS # BLD AUTO: 7.4 K/UL
NEUTROPHILS # BLD AUTO: 7.7 K/UL
NEUTROPHILS # BLD AUTO: 9.4 K/UL
NEUTROPHILS NFR BLD: 58.7 %
NEUTROPHILS NFR BLD: 59 %
NEUTROPHILS NFR BLD: 59.2 %
NEUTROPHILS NFR BLD: 64.5 %
NEUTROPHILS NFR BLD: 64.6 %
NEUTROPHILS NFR BLD: 68.8 %
NEUTROPHILS NFR BLD: 71.5 %
NEUTROPHILS NFR BLD: 72 %
NEUTROPHILS NFR BLD: 72.3 %
NEUTROPHILS NFR BLD: 72.8 %
NEUTROPHILS NFR BLD: 78.2 %
NEUTROPHILS NFR BLD: 78.8 %
NEUTROPHILS NFR BLD: 79.8 %
NEUTROPHILS NFR BLD: 80.7 %
NEUTROPHILS NFR BLD: 81.9 %
NEUTROPHILS NFR BLD: 82.9 %
NEUTROPHILS NFR BLD: 88.7 %
NEUTS BAND NFR BLD MANUAL: 2 %
NEUTS BAND NFR BLD MANUAL: 3 %
NITRITE UR QL STRIP: NEGATIVE
NON-SQ EPI CELLS #/AREA URNS HPF: NORMAL /HPF
NONHDLC SERPL-MCNC: 28 MG/DL
NONHDLC SERPL-MCNC: 38 MG/DL
OB PNL STL: NEGATIVE
OPIATES UR QL SCN: NEGATIVE
OPIATES UR QL SCN: NORMAL
OVALOCYTES BLD QL SMEAR: ABNORMAL
PCO2 BLDA: 27.9 MMHG (ref 35–45)
PCO2 BLDA: 45.4 MMHG (ref 35–45)
PCO2 BLDA: 45.6 MMHG (ref 35–45)
PCO2 BLDA: 45.9 MMHG (ref 35–45)
PCP UR QL SCN>25 NG/ML: NEGATIVE
PCP UR QL SCN>25 NG/ML: NEGATIVE
PERIPHERAL STENOSIS: ABNORMAL
PH SMN: 7.11 [PH] (ref 7.35–7.45)
PH SMN: 7.26 [PH] (ref 7.35–7.45)
PH SMN: 7.34 [PH] (ref 7.35–7.45)
PH SMN: 7.4 [PH] (ref 7.35–7.45)
PH UR STRIP: 5 [PH] (ref 5–8)
PH UR STRIP: 6 [PH] (ref 5–8)
PH UR STRIP: 6 [PH] (ref 5–8)
PHOSPHATE SERPL-MCNC: 2.5 MG/DL
PHOSPHATE SERPL-MCNC: 2.5 MG/DL
PHOSPHATE SERPL-MCNC: 3.1 MG/DL
PHOSPHATE SERPL-MCNC: 3.2 MG/DL
PHOSPHATE SERPL-MCNC: 3.4 MG/DL
PHOSPHATE SERPL-MCNC: 3.4 MG/DL
PHOSPHATE SERPL-MCNC: 3.5 MG/DL
PHOSPHATE SERPL-MCNC: 3.8 MG/DL
PHOSPHATE SERPL-MCNC: 3.9 MG/DL
PHOSPHATE SERPL-MCNC: 4.3 MG/DL
PHOSPHATE SERPL-MCNC: 4.4 MG/DL
PHOSPHATE SERPL-MCNC: 4.5 MG/DL
PHOSPHATE SERPL-MCNC: 4.6 MG/DL
PHOSPHATE SERPL-MCNC: 4.7 MG/DL
PHOSPHATE SERPL-MCNC: 5.1 MG/DL
PHOSPHATE SERPL-MCNC: 5.6 MG/DL
PHOSPHATE SERPL-MCNC: 5.7 MG/DL
PHOSPHATE SERPL-MCNC: 5.8 MG/DL
PHOSPHATE SERPL-MCNC: 5.8 MG/DL
PHOSPHATE SERPL-MCNC: 6 MG/DL
PHOSPHATE SERPL-MCNC: 6.1 MG/DL
PHOSPHATE SERPL-MCNC: 6.1 MG/DL
PHOSPHATE SERPL-MCNC: 6.2 MG/DL
PHOSPHATE SERPL-MCNC: 6.6 MG/DL
PLATELET # BLD AUTO: 103 K/UL
PLATELET # BLD AUTO: 116 K/UL
PLATELET # BLD AUTO: 120 K/UL
PLATELET # BLD AUTO: 127 K/UL
PLATELET # BLD AUTO: 131 K/UL
PLATELET # BLD AUTO: 137 K/UL
PLATELET # BLD AUTO: 147 K/UL
PLATELET # BLD AUTO: 156 K/UL
PLATELET # BLD AUTO: 162 K/UL
PLATELET # BLD AUTO: 66 K/UL
PLATELET # BLD AUTO: 73 K/UL
PLATELET # BLD AUTO: 77 K/UL
PLATELET # BLD AUTO: 80 K/UL
PLATELET # BLD AUTO: 86 K/UL
PLATELET # BLD AUTO: 87 K/UL
PLATELET # BLD AUTO: 96 K/UL
PLATELET # BLD AUTO: 98 K/UL
PLATELET BLD QL SMEAR: ABNORMAL
PMV BLD AUTO: ABNORMAL FL
PO2 BLDA: 191 MMHG (ref 80–100)
PO2 BLDA: 329 MMHG (ref 80–100)
PO2 BLDA: 53 MMHG (ref 40–60)
PO2 BLDA: 86 MMHG (ref 80–100)
POC BE: -1 MMOL/L
POC BE: -14 MMOL/L
POC BE: -15 MMOL/L
POC BE: 4 MMOL/L
POC IONIZED CALCIUM: 0.9 MMOL/L (ref 1.06–1.42)
POC IONIZED CALCIUM: 1.06 MMOL/L (ref 1.06–1.42)
POC SATURATED O2: 100 % (ref 95–100)
POC SATURATED O2: 86 % (ref 95–100)
POC SATURATED O2: 96 % (ref 95–100)
POC SATURATED O2: 99 % (ref 95–100)
POC TCO2: 13 MMOL/L (ref 23–27)
POC TCO2: 16 MMOL/L (ref 23–27)
POC TCO2: 26 MMOL/L (ref 23–27)
POC TCO2: 30 MMOL/L (ref 24–29)
POCT GLUCOSE: 102 MG/DL (ref 70–110)
POCT GLUCOSE: 106 MG/DL (ref 70–110)
POCT GLUCOSE: 107 MG/DL (ref 70–110)
POCT GLUCOSE: 107 MG/DL (ref 70–110)
POCT GLUCOSE: 108 MG/DL (ref 70–110)
POCT GLUCOSE: 114 MG/DL (ref 70–110)
POCT GLUCOSE: 122 MG/DL (ref 70–110)
POCT GLUCOSE: 123 MG/DL (ref 70–110)
POCT GLUCOSE: 125 MG/DL (ref 70–110)
POCT GLUCOSE: 127 MG/DL (ref 70–110)
POCT GLUCOSE: 133 MG/DL (ref 70–110)
POCT GLUCOSE: 134 MG/DL (ref 70–110)
POCT GLUCOSE: 138 MG/DL (ref 70–110)
POCT GLUCOSE: 142 MG/DL (ref 70–110)
POCT GLUCOSE: 149 MG/DL (ref 70–110)
POCT GLUCOSE: 150 MG/DL (ref 70–110)
POCT GLUCOSE: 157 MG/DL (ref 70–110)
POCT GLUCOSE: 158 MG/DL (ref 70–110)
POCT GLUCOSE: 161 MG/DL (ref 70–110)
POCT GLUCOSE: 166 MG/DL (ref 70–110)
POCT GLUCOSE: 171 MG/DL (ref 70–110)
POCT GLUCOSE: 177 MG/DL (ref 70–110)
POCT GLUCOSE: 180 MG/DL (ref 70–110)
POCT GLUCOSE: 181 MG/DL (ref 70–110)
POCT GLUCOSE: 182 MG/DL (ref 70–110)
POCT GLUCOSE: 184 MG/DL (ref 70–110)
POCT GLUCOSE: 185 MG/DL (ref 70–110)
POCT GLUCOSE: 197 MG/DL (ref 70–110)
POCT GLUCOSE: 199 MG/DL (ref 70–110)
POCT GLUCOSE: 209 MG/DL (ref 70–110)
POCT GLUCOSE: 210 MG/DL (ref 70–110)
POCT GLUCOSE: 217 MG/DL (ref 70–110)
POCT GLUCOSE: 219 MG/DL (ref 70–110)
POCT GLUCOSE: 221 MG/DL (ref 70–110)
POCT GLUCOSE: 232 MG/DL (ref 70–110)
POCT GLUCOSE: 239 MG/DL (ref 70–110)
POCT GLUCOSE: 242 MG/DL (ref 70–110)
POCT GLUCOSE: 254 MG/DL (ref 70–110)
POCT GLUCOSE: 255 MG/DL (ref 70–110)
POCT GLUCOSE: 260 MG/DL (ref 70–110)
POCT GLUCOSE: 272 MG/DL (ref 70–110)
POCT GLUCOSE: 274 MG/DL (ref 70–110)
POCT GLUCOSE: 279 MG/DL (ref 70–110)
POCT GLUCOSE: 282 MG/DL (ref 70–110)
POCT GLUCOSE: 296 MG/DL (ref 70–110)
POCT GLUCOSE: 306 MG/DL (ref 70–110)
POCT GLUCOSE: 308 MG/DL (ref 70–110)
POCT GLUCOSE: 316 MG/DL (ref 70–110)
POCT GLUCOSE: 317 MG/DL (ref 70–110)
POCT GLUCOSE: 335 MG/DL (ref 70–110)
POCT GLUCOSE: 358 MG/DL (ref 70–110)
POCT GLUCOSE: 360 MG/DL (ref 70–110)
POCT GLUCOSE: 360 MG/DL (ref 70–110)
POCT GLUCOSE: 374 MG/DL (ref 70–110)
POCT GLUCOSE: 376 MG/DL (ref 70–110)
POCT GLUCOSE: 396 MG/DL (ref 70–110)
POCT GLUCOSE: 417 MG/DL (ref 70–110)
POCT GLUCOSE: 435 MG/DL (ref 70–110)
POCT GLUCOSE: 440 MG/DL (ref 70–110)
POCT GLUCOSE: 46 MG/DL (ref 70–110)
POCT GLUCOSE: 460 MG/DL (ref 70–110)
POCT GLUCOSE: 69 MG/DL (ref 70–110)
POCT GLUCOSE: 73 MG/DL (ref 70–110)
POCT GLUCOSE: 84 MG/DL (ref 70–110)
POCT GLUCOSE: 88 MG/DL (ref 70–110)
POCT GLUCOSE: 99 MG/DL (ref 70–110)
POCT GLUCOSE: 99 MG/DL (ref 70–110)
POIKILOCYTOSIS BLD QL SMEAR: ABNORMAL
POIKILOCYTOSIS BLD QL SMEAR: SLIGHT
POLYCHROMASIA BLD QL SMEAR: ABNORMAL
POTASSIUM BLD-SCNC: 5.1 MMOL/L (ref 3.5–5.1)
POTASSIUM BLD-SCNC: 5.6 MMOL/L (ref 3.5–5.1)
POTASSIUM SERPL-SCNC: 3.5 MMOL/L
POTASSIUM SERPL-SCNC: 3.9 MMOL/L
POTASSIUM SERPL-SCNC: 4 MMOL/L
POTASSIUM SERPL-SCNC: 4.2 MMOL/L
POTASSIUM SERPL-SCNC: 4.4 MMOL/L
POTASSIUM SERPL-SCNC: 4.5 MMOL/L
POTASSIUM SERPL-SCNC: 4.5 MMOL/L
POTASSIUM SERPL-SCNC: 4.6 MMOL/L
POTASSIUM SERPL-SCNC: 4.7 MMOL/L
POTASSIUM SERPL-SCNC: 4.8 MMOL/L
POTASSIUM SERPL-SCNC: 4.9 MMOL/L
POTASSIUM SERPL-SCNC: 4.9 MMOL/L
POTASSIUM SERPL-SCNC: 5 MMOL/L
POTASSIUM SERPL-SCNC: 5 MMOL/L
POTASSIUM SERPL-SCNC: 5.1 MMOL/L
POTASSIUM SERPL-SCNC: 5.2 MMOL/L
POTASSIUM SERPL-SCNC: 5.3 MMOL/L
POTASSIUM SERPL-SCNC: 5.3 MMOL/L
POTASSIUM SERPL-SCNC: 5.7 MMOL/L
POTASSIUM SERPL-SCNC: 5.7 MMOL/L
POTASSIUM SERPL-SCNC: 5.8 MMOL/L
POTASSIUM SERPL-SCNC: 6.2 MMOL/L
POTASSIUM SERPL-SCNC: 6.4 MMOL/L
PROCALCITONIN SERPL IA-MCNC: 0.72 NG/ML
PROT SERPL-MCNC: 5.1 G/DL
PROT SERPL-MCNC: 5.2 G/DL
PROT SERPL-MCNC: 5.3 G/DL
PROT SERPL-MCNC: 5.3 G/DL
PROT SERPL-MCNC: 5.4 G/DL
PROT SERPL-MCNC: 5.7 G/DL
PROT SERPL-MCNC: 5.8 G/DL
PROT SERPL-MCNC: 5.9 G/DL
PROT SERPL-MCNC: 5.9 G/DL
PROT SERPL-MCNC: 6 G/DL
PROT SERPL-MCNC: 6.3 G/DL
PROT SERPL-MCNC: 6.3 G/DL
PROT SERPL-MCNC: 6.4 G/DL
PROT SERPL-MCNC: 6.8 G/DL
PROT UR QL STRIP: ABNORMAL
PROTHROMBIN TIME: 12 SEC
RBC # BLD AUTO: 3.46 M/UL
RBC # BLD AUTO: 3.48 M/UL
RBC # BLD AUTO: 3.55 M/UL
RBC # BLD AUTO: 3.66 M/UL
RBC # BLD AUTO: 3.67 M/UL
RBC # BLD AUTO: 3.78 M/UL
RBC # BLD AUTO: 3.94 M/UL
RBC # BLD AUTO: 4.09 M/UL
RBC # BLD AUTO: 4.23 M/UL
RBC # BLD AUTO: 4.23 M/UL
RBC # BLD AUTO: 4.5 M/UL
RBC # BLD AUTO: 4.59 M/UL
RBC # BLD AUTO: 4.59 M/UL
RBC # BLD AUTO: 4.6 M/UL
RBC # BLD AUTO: 4.62 M/UL
RBC # BLD AUTO: 4.72 M/UL
RBC # BLD AUTO: 4.88 M/UL
RBC #/AREA URNS HPF: 0 /HPF (ref 0–4)
RBC #/AREA URNS HPF: 1 /HPF (ref 0–4)
RBC #/AREA URNS HPF: 3 /HPF (ref 0–4)
RETIRED EF AND QEF - SEE NOTES: 20 (ref 55–65)
RETIRED EF AND QEF - SEE NOTES: 35 (ref 55–65)
SAMPLE: ABNORMAL
SATURATED IRON: 25 %
SCHISTOCYTES BLD QL SMEAR: ABNORMAL
SCHISTOCYTES BLD QL SMEAR: ABNORMAL
SCHISTOCYTES BLD QL SMEAR: PRESENT
SITE: ABNORMAL
SODIUM BLD-SCNC: 126 MMOL/L (ref 136–145)
SODIUM BLD-SCNC: 137 MMOL/L (ref 136–145)
SODIUM SERPL-SCNC: 121 MMOL/L
SODIUM SERPL-SCNC: 123 MMOL/L
SODIUM SERPL-SCNC: 124 MMOL/L
SODIUM SERPL-SCNC: 124 MMOL/L
SODIUM SERPL-SCNC: 125 MMOL/L
SODIUM SERPL-SCNC: 127 MMOL/L
SODIUM SERPL-SCNC: 128 MMOL/L
SODIUM SERPL-SCNC: 129 MMOL/L
SODIUM SERPL-SCNC: 130 MMOL/L
SODIUM SERPL-SCNC: 132 MMOL/L
SODIUM SERPL-SCNC: 133 MMOL/L
SODIUM SERPL-SCNC: 134 MMOL/L
SODIUM SERPL-SCNC: 134 MMOL/L
SODIUM SERPL-SCNC: 135 MMOL/L
SODIUM SERPL-SCNC: 136 MMOL/L
SODIUM SERPL-SCNC: 137 MMOL/L
SODIUM SERPL-SCNC: 138 MMOL/L
SODIUM SERPL-SCNC: 139 MMOL/L
SODIUM SERPL-SCNC: 140 MMOL/L
SODIUM SERPL-SCNC: 141 MMOL/L
SP GR UR STRIP: 1.01 (ref 1–1.03)
SP GR UR STRIP: <=1.005 (ref 1–1.03)
SP GR UR STRIP: >=1.03 (ref 1–1.03)
SPHEROCYTES BLD QL SMEAR: ABNORMAL
SQUAMOUS #/AREA URNS HPF: 0 /HPF
SQUAMOUS #/AREA URNS HPF: 0 /HPF
SQUAMOUS #/AREA URNS HPF: 2 /HPF
T3FREE SERPL-MCNC: 1 PG/ML
T4 FREE SERPL-MCNC: 0.86 NG/DL
TARGETS BLD QL SMEAR: ABNORMAL
TOTAL IRON BINDING CAPACITY: 186 UG/DL
TOXICOLOGY INFORMATION: NORMAL
TOXICOLOGY INFORMATION: NORMAL
TRANSFERRIN SERPL-MCNC: 126 MG/DL
TRICUSPID VALVE REGURGITATION: ABNORMAL
TRICUSPID VALVE REGURGITATION: ABNORMAL
TRIGL SERPL-MCNC: 46 MG/DL
TRIGL SERPL-MCNC: 47 MG/DL
TROPONIN I SERPL DL<=0.01 NG/ML-MCNC: 0.94 NG/ML
TROPONIN I SERPL DL<=0.01 NG/ML-MCNC: 0.95 NG/ML
TROPONIN I SERPL DL<=0.01 NG/ML-MCNC: 1.11 NG/ML
TROPONIN I SERPL DL<=0.01 NG/ML-MCNC: 1.21 NG/ML
TROPONIN I SERPL DL<=0.01 NG/ML-MCNC: 3.55 NG/ML
TROPONIN I SERPL DL<=0.01 NG/ML-MCNC: 4.04 NG/ML
TSH SERPL DL<=0.005 MIU/L-ACNC: 0.31 UIU/ML
TSH SERPL DL<=0.005 MIU/L-ACNC: 1.3 UIU/ML
TSH SERPL DL<=0.005 MIU/L-ACNC: 1.69 UIU/ML
URN SPEC COLLECT METH UR: ABNORMAL
UROBILINOGEN UR STRIP-ACNC: NEGATIVE EU/DL
VANCOMYCIN SERPL-MCNC: 11.2 UG/ML
VANCOMYCIN SERPL-MCNC: 11.6 UG/ML
VANCOMYCIN SERPL-MCNC: 9.7 UG/ML
VIT B12 SERPL-MCNC: 780 PG/ML
WBC # BLD AUTO: 11.46 K/UL
WBC # BLD AUTO: 16.53 K/UL
WBC # BLD AUTO: 30.07 K/UL
WBC # BLD AUTO: 5.2 K/UL
WBC # BLD AUTO: 5.34 K/UL
WBC # BLD AUTO: 6.39 K/UL
WBC # BLD AUTO: 6.47 K/UL
WBC # BLD AUTO: 6.5 K/UL
WBC # BLD AUTO: 6.6 K/UL
WBC # BLD AUTO: 6.62 K/UL
WBC # BLD AUTO: 6.9 K/UL
WBC # BLD AUTO: 7.08 K/UL
WBC # BLD AUTO: 8.2 K/UL
WBC # BLD AUTO: 8.97 K/UL
WBC # BLD AUTO: 9.27 K/UL
WBC # BLD AUTO: 9.44 K/UL
WBC # BLD AUTO: 9.97 K/UL
WBC #/AREA URNS HPF: 0 /HPF (ref 0–5)
WBC #/AREA URNS HPF: 2 /HPF (ref 0–5)
WBC #/AREA URNS HPF: 4 /HPF (ref 0–5)
WBC CLUMPS URNS QL MICRO: ABNORMAL
WBC CLUMPS URNS QL MICRO: NORMAL
YEAST URNS QL MICRO: ABNORMAL
YEAST URNS QL MICRO: NORMAL
YEAST URNS QL MICRO: NORMAL

## 2017-01-01 PROCEDURE — 82010 KETONE BODYS QUAN: CPT

## 2017-01-01 PROCEDURE — 83605 ASSAY OF LACTIC ACID: CPT

## 2017-01-01 PROCEDURE — 80048 BASIC METABOLIC PNL TOTAL CA: CPT

## 2017-01-01 PROCEDURE — C1757 CATH, THROMBECTOMY/EMBOLECT: HCPCS | Performed by: SURGERY

## 2017-01-01 PROCEDURE — 25000003 PHARM REV CODE 250: Performed by: NURSE ANESTHETIST, CERTIFIED REGISTERED

## 2017-01-01 PROCEDURE — 80061 LIPID PANEL: CPT

## 2017-01-01 PROCEDURE — 25000003 PHARM REV CODE 250

## 2017-01-01 PROCEDURE — 63600175 PHARM REV CODE 636 W HCPCS: Performed by: STUDENT IN AN ORGANIZED HEALTH CARE EDUCATION/TRAINING PROGRAM

## 2017-01-01 PROCEDURE — 11000001 HC ACUTE MED/SURG PRIVATE ROOM

## 2017-01-01 PROCEDURE — 97161 PT EVAL LOW COMPLEX 20 MIN: CPT

## 2017-01-01 PROCEDURE — 83036 HEMOGLOBIN GLYCOSYLATED A1C: CPT

## 2017-01-01 PROCEDURE — 93010 ELECTROCARDIOGRAM REPORT: CPT | Mod: ,,, | Performed by: INTERNAL MEDICINE

## 2017-01-01 PROCEDURE — 84443 ASSAY THYROID STIM HORMONE: CPT

## 2017-01-01 PROCEDURE — G8978 MOBILITY CURRENT STATUS: HCPCS | Mod: CK

## 2017-01-01 PROCEDURE — 25000003 PHARM REV CODE 250: Performed by: SURGERY

## 2017-01-01 PROCEDURE — 36415 COLL VENOUS BLD VENIPUNCTURE: CPT

## 2017-01-01 PROCEDURE — 81000 URINALYSIS NONAUTO W/SCOPE: CPT

## 2017-01-01 PROCEDURE — 99291 CRITICAL CARE FIRST HOUR: CPT | Mod: ,,, | Performed by: INTERNAL MEDICINE

## 2017-01-01 PROCEDURE — 82962 GLUCOSE BLOOD TEST: CPT

## 2017-01-01 PROCEDURE — 93306 TTE W/DOPPLER COMPLETE: CPT | Mod: 26,,, | Performed by: INTERNAL MEDICINE

## 2017-01-01 PROCEDURE — G0378 HOSPITAL OBSERVATION PER HR: HCPCS

## 2017-01-01 PROCEDURE — 83735 ASSAY OF MAGNESIUM: CPT | Mod: 91

## 2017-01-01 PROCEDURE — 83735 ASSAY OF MAGNESIUM: CPT

## 2017-01-01 PROCEDURE — 84439 ASSAY OF FREE THYROXINE: CPT

## 2017-01-01 PROCEDURE — G8979 MOBILITY GOAL STATUS: HCPCS | Mod: CJ

## 2017-01-01 PROCEDURE — 20000000 HC ICU ROOM

## 2017-01-01 PROCEDURE — 93005 ELECTROCARDIOGRAM TRACING: CPT

## 2017-01-01 PROCEDURE — 99999 PR PBB SHADOW E&M-EST. PATIENT-LVL V: CPT | Mod: PBBFAC,,, | Performed by: INTERNAL MEDICINE

## 2017-01-01 PROCEDURE — 25000003 PHARM REV CODE 250: Performed by: INTERNAL MEDICINE

## 2017-01-01 PROCEDURE — 25000003 PHARM REV CODE 250: Performed by: FAMILY MEDICINE

## 2017-01-01 PROCEDURE — 87449 NOS EACH ORGANISM AG IA: CPT

## 2017-01-01 PROCEDURE — 63600175 PHARM REV CODE 636 W HCPCS

## 2017-01-01 PROCEDURE — 80069 RENAL FUNCTION PANEL: CPT

## 2017-01-01 PROCEDURE — 94640 AIRWAY INHALATION TREATMENT: CPT

## 2017-01-01 PROCEDURE — 94761 N-INVAS EAR/PLS OXIMETRY MLT: CPT

## 2017-01-01 PROCEDURE — 82272 OCCULT BLD FECES 1-3 TESTS: CPT

## 2017-01-01 PROCEDURE — 80100016 HC MAINTENANCE HEMODIALYSIS

## 2017-01-01 PROCEDURE — 82746 ASSAY OF FOLIC ACID SERUM: CPT

## 2017-01-01 PROCEDURE — 80307 DRUG TEST PRSMV CHEM ANLYZR: CPT

## 2017-01-01 PROCEDURE — 87040 BLOOD CULTURE FOR BACTERIA: CPT

## 2017-01-01 PROCEDURE — 63600175 PHARM REV CODE 636 W HCPCS: Performed by: HOSPITALIST

## 2017-01-01 PROCEDURE — 80053 COMPREHEN METABOLIC PANEL: CPT

## 2017-01-01 PROCEDURE — 84484 ASSAY OF TROPONIN QUANT: CPT | Mod: 91

## 2017-01-01 PROCEDURE — 99223 1ST HOSP IP/OBS HIGH 75: CPT | Mod: ,,, | Performed by: INTERNAL MEDICINE

## 2017-01-01 PROCEDURE — 90945 DIALYSIS ONE EVALUATION: CPT

## 2017-01-01 PROCEDURE — 85025 COMPLETE CBC W/AUTO DIFF WBC: CPT

## 2017-01-01 PROCEDURE — 84100 ASSAY OF PHOSPHORUS: CPT

## 2017-01-01 PROCEDURE — 80069 RENAL FUNCTION PANEL: CPT | Mod: 91

## 2017-01-01 PROCEDURE — 96374 THER/PROPH/DIAG INJ IV PUSH: CPT

## 2017-01-01 PROCEDURE — 99223 1ST HOSP IP/OBS HIGH 75: CPT | Mod: ,,, | Performed by: FAMILY MEDICINE

## 2017-01-01 PROCEDURE — 63600175 PHARM REV CODE 636 W HCPCS: Performed by: SURGERY

## 2017-01-01 PROCEDURE — 80053 COMPREHEN METABOLIC PANEL: CPT | Mod: 91

## 2017-01-01 PROCEDURE — 82803 BLOOD GASES ANY COMBINATION: CPT

## 2017-01-01 PROCEDURE — 63600175 PHARM REV CODE 636 W HCPCS: Performed by: EMERGENCY MEDICINE

## 2017-01-01 PROCEDURE — 80100008 HC CRRT DAILY MAINTENANCE

## 2017-01-01 PROCEDURE — 84481 FREE ASSAY (FT-3): CPT

## 2017-01-01 PROCEDURE — B2151ZZ FLUOROSCOPY OF LEFT HEART USING LOW OSMOLAR CONTRAST: ICD-10-PCS | Performed by: INTERNAL MEDICINE

## 2017-01-01 PROCEDURE — 25000003 PHARM REV CODE 250: Performed by: HOSPITALIST

## 2017-01-01 PROCEDURE — 63600175 PHARM REV CODE 636 W HCPCS: Performed by: INTERNAL MEDICINE

## 2017-01-01 PROCEDURE — 1160F RVW MEDS BY RX/DR IN RCRD: CPT | Mod: S$GLB,,, | Performed by: NURSE PRACTITIONER

## 2017-01-01 PROCEDURE — 37000009 HC ANESTHESIA EA ADD 15 MINS: Performed by: SURGERY

## 2017-01-01 PROCEDURE — 97110 THERAPEUTIC EXERCISES: CPT

## 2017-01-01 PROCEDURE — 85007 BL SMEAR W/DIFF WBC COUNT: CPT

## 2017-01-01 PROCEDURE — 97165 OT EVAL LOW COMPLEX 30 MIN: CPT

## 2017-01-01 PROCEDURE — 25000242 PHARM REV CODE 250 ALT 637 W/ HCPCS: Performed by: HOSPITALIST

## 2017-01-01 PROCEDURE — 92928 PRQ TCAT PLMT NTRAC ST 1 LES: CPT | Mod: RC,,, | Performed by: INTERNAL MEDICINE

## 2017-01-01 PROCEDURE — 97530 THERAPEUTIC ACTIVITIES: CPT

## 2017-01-01 PROCEDURE — 93306 TTE W/DOPPLER COMPLETE: CPT

## 2017-01-01 PROCEDURE — 71000033 HC RECOVERY, INTIAL HOUR: Performed by: SURGERY

## 2017-01-01 PROCEDURE — 25000003 PHARM REV CODE 250: Performed by: STUDENT IN AN ORGANIZED HEALTH CARE EDUCATION/TRAINING PROGRAM

## 2017-01-01 PROCEDURE — 84484 ASSAY OF TROPONIN QUANT: CPT

## 2017-01-01 PROCEDURE — 82728 ASSAY OF FERRITIN: CPT

## 2017-01-01 PROCEDURE — 87340 HEPATITIS B SURFACE AG IA: CPT

## 2017-01-01 PROCEDURE — 25000242 PHARM REV CODE 250 ALT 637 W/ HCPCS: Performed by: EMERGENCY MEDICINE

## 2017-01-01 PROCEDURE — 25000242 PHARM REV CODE 250 ALT 637 W/ HCPCS: Performed by: STUDENT IN AN ORGANIZED HEALTH CARE EDUCATION/TRAINING PROGRAM

## 2017-01-01 PROCEDURE — 84100 ASSAY OF PHOSPHORUS: CPT | Mod: 91

## 2017-01-01 PROCEDURE — 4A023N7 MEASUREMENT OF CARDIAC SAMPLING AND PRESSURE, LEFT HEART, PERCUTANEOUS APPROACH: ICD-10-PCS | Performed by: INTERNAL MEDICINE

## 2017-01-01 PROCEDURE — 27000221 HC OXYGEN, UP TO 24 HOURS

## 2017-01-01 PROCEDURE — 36000707: Performed by: SURGERY

## 2017-01-01 PROCEDURE — C1769 GUIDE WIRE: HCPCS

## 2017-01-01 PROCEDURE — P9047 ALBUMIN (HUMAN), 25%, 50ML: HCPCS | Performed by: INTERNAL MEDICINE

## 2017-01-01 PROCEDURE — 99232 SBSQ HOSP IP/OBS MODERATE 35: CPT | Mod: ,,, | Performed by: INTERNAL MEDICINE

## 2017-01-01 PROCEDURE — 63600175 PHARM REV CODE 636 W HCPCS: Performed by: NURSE ANESTHETIST, CERTIFIED REGISTERED

## 2017-01-01 PROCEDURE — G8988 SELF CARE GOAL STATUS: HCPCS | Mod: CI

## 2017-01-01 PROCEDURE — G8987 SELF CARE CURRENT STATUS: HCPCS | Mod: CJ

## 2017-01-01 PROCEDURE — 80202 ASSAY OF VANCOMYCIN: CPT

## 2017-01-01 PROCEDURE — G8989 SELF CARE D/C STATUS: HCPCS | Mod: CJ

## 2017-01-01 PROCEDURE — 99215 OFFICE O/P EST HI 40 MIN: CPT | Mod: S$GLB,,, | Performed by: INTERNAL MEDICINE

## 2017-01-01 PROCEDURE — 93926 LOWER EXTREMITY STUDY: CPT | Mod: TC

## 2017-01-01 PROCEDURE — 85027 COMPLETE CBC AUTOMATED: CPT

## 2017-01-01 PROCEDURE — 63600175 PHARM REV CODE 636 W HCPCS: Performed by: FAMILY MEDICINE

## 2017-01-01 PROCEDURE — 82553 CREATINE MB FRACTION: CPT

## 2017-01-01 PROCEDURE — 83540 ASSAY OF IRON: CPT

## 2017-01-01 PROCEDURE — 99215 OFFICE O/P EST HI 40 MIN: CPT | Mod: PBBFAC,PO | Performed by: INTERNAL MEDICINE

## 2017-01-01 PROCEDURE — 02703ZZ DILATION OF CORONARY ARTERY, ONE ARTERY, PERCUTANEOUS APPROACH: ICD-10-PCS | Performed by: INTERNAL MEDICINE

## 2017-01-01 PROCEDURE — 82607 VITAMIN B-12: CPT

## 2017-01-01 PROCEDURE — 99999 PR PBB SHADOW E&M-EST. PATIENT-LVL III: CPT | Mod: PBBFAC,,, | Performed by: NURSE PRACTITIONER

## 2017-01-01 PROCEDURE — 93458 L HRT ARTERY/VENTRICLE ANGIO: CPT | Mod: 26,59,, | Performed by: INTERNAL MEDICINE

## 2017-01-01 PROCEDURE — 86706 HEP B SURFACE ANTIBODY: CPT

## 2017-01-01 PROCEDURE — B2111ZZ FLUOROSCOPY OF MULTIPLE CORONARY ARTERIES USING LOW OSMOLAR CONTRAST: ICD-10-PCS | Performed by: INTERNAL MEDICINE

## 2017-01-01 PROCEDURE — 96372 THER/PROPH/DIAG INJ SC/IM: CPT

## 2017-01-01 PROCEDURE — 90935 HEMODIALYSIS ONE EVALUATION: CPT

## 2017-01-01 PROCEDURE — 85610 PROTHROMBIN TIME: CPT

## 2017-01-01 PROCEDURE — 27200139 CATH LAB PROCEDURE

## 2017-01-01 PROCEDURE — C1751 CATH, INF, PER/CENT/MIDLINE: HCPCS | Performed by: SURGERY

## 2017-01-01 PROCEDURE — 80329 ANALGESICS NON-OPIOID 1 OR 2: CPT

## 2017-01-01 PROCEDURE — 83605 ASSAY OF LACTIC ACID: CPT | Mod: 91

## 2017-01-01 PROCEDURE — 93970 EXTREMITY STUDY: CPT | Mod: TC,LT

## 2017-01-01 PROCEDURE — 25000003 PHARM REV CODE 250: Performed by: EMERGENCY MEDICINE

## 2017-01-01 PROCEDURE — 84145 PROCALCITONIN (PCT): CPT

## 2017-01-01 PROCEDURE — 99213 OFFICE O/P EST LOW 20 MIN: CPT | Mod: S$GLB,,, | Performed by: NURSE PRACTITIONER

## 2017-01-01 PROCEDURE — 94002 VENT MGMT INPAT INIT DAY: CPT

## 2017-01-01 PROCEDURE — 37000008 HC ANESTHESIA 1ST 15 MINUTES: Performed by: SURGERY

## 2017-01-01 PROCEDURE — 99900035 HC TECH TIME PER 15 MIN (STAT)

## 2017-01-01 PROCEDURE — 99285 EMERGENCY DEPT VISIT HI MDM: CPT

## 2017-01-01 PROCEDURE — 80048 BASIC METABOLIC PNL TOTAL CA: CPT | Mod: 91

## 2017-01-01 PROCEDURE — 82533 TOTAL CORTISOL: CPT

## 2017-01-01 PROCEDURE — 36600 WITHDRAWAL OF ARTERIAL BLOOD: CPT

## 2017-01-01 PROCEDURE — 05CY0ZZ EXTIRPATION OF MATTER FROM UPPER VEIN, OPEN APPROACH: ICD-10-PCS | Performed by: SURGERY

## 2017-01-01 PROCEDURE — G0257 UNSCHED DIALYSIS ESRD PT HOS: HCPCS

## 2017-01-01 PROCEDURE — G8980 MOBILITY D/C STATUS: HCPCS | Mod: CK

## 2017-01-01 PROCEDURE — 27201423 OPTIME MED/SURG SUP & DEVICES STERILE SUPPLY: Performed by: SURGERY

## 2017-01-01 PROCEDURE — C1894 INTRO/SHEATH, NON-LASER: HCPCS | Performed by: SURGERY

## 2017-01-01 PROCEDURE — 36000706: Performed by: SURGERY

## 2017-01-01 PROCEDURE — 63600175 PHARM REV CODE 636 W HCPCS: Performed by: PODIATRIST

## 2017-01-01 PROCEDURE — 99285 EMERGENCY DEPT VISIT HI MDM: CPT | Mod: 25

## 2017-01-01 PROCEDURE — 97535 SELF CARE MNGMENT TRAINING: CPT

## 2017-01-01 PROCEDURE — 83880 ASSAY OF NATRIURETIC PEPTIDE: CPT

## 2017-01-01 PROCEDURE — 99152 MOD SED SAME PHYS/QHP 5/>YRS: CPT | Mod: ,,, | Performed by: INTERNAL MEDICINE

## 2017-01-01 PROCEDURE — 96375 TX/PRO/DX INJ NEW DRUG ADDON: CPT

## 2017-01-01 DEVICE — IMPLANTABLE DEVICE: Type: IMPLANTABLE DEVICE | Site: SUBCLAVIAN | Status: FUNCTIONAL

## 2017-01-01 RX ORDER — PROPOFOL 10 MG/ML
VIAL (ML) INTRAVENOUS
Status: DISCONTINUED | OUTPATIENT
Start: 2017-01-01 | End: 2017-01-01

## 2017-01-01 RX ORDER — PANTOPRAZOLE SODIUM 40 MG/1
40 TABLET, DELAYED RELEASE ORAL DAILY
Status: DISCONTINUED | OUTPATIENT
Start: 2017-01-01 | End: 2017-01-01 | Stop reason: HOSPADM

## 2017-01-01 RX ORDER — CLOPIDOGREL BISULFATE 75 MG/1
75 TABLET ORAL DAILY
Status: DISCONTINUED | OUTPATIENT
Start: 2017-01-01 | End: 2017-01-01 | Stop reason: HOSPADM

## 2017-01-01 RX ORDER — NITROGLYCERIN 20 MG/100ML
5 INJECTION INTRAVENOUS CONTINUOUS
Status: DISCONTINUED | OUTPATIENT
Start: 2017-01-01 | End: 2017-01-01

## 2017-01-01 RX ORDER — NALOXONE HCL 0.4 MG/ML
0.4 VIAL (ML) INJECTION ONCE
Status: COMPLETED | OUTPATIENT
Start: 2017-01-01 | End: 2017-01-01

## 2017-01-01 RX ORDER — CLOPIDOGREL BISULFATE 75 MG/1
300 TABLET ORAL ONCE
Status: COMPLETED | OUTPATIENT
Start: 2017-01-01 | End: 2017-01-01

## 2017-01-01 RX ORDER — IBUPROFEN 200 MG
24 TABLET ORAL
Status: DISCONTINUED | OUTPATIENT
Start: 2017-01-01 | End: 2017-01-01 | Stop reason: SDUPTHER

## 2017-01-01 RX ORDER — SODIUM CHLORIDE 9 MG/ML
INJECTION, SOLUTION INTRAVENOUS
Status: CANCELLED | OUTPATIENT
Start: 2017-01-01

## 2017-01-01 RX ORDER — INSULIN ASPART 100 [IU]/ML
5 INJECTION, SOLUTION INTRAVENOUS; SUBCUTANEOUS
Qty: 4.5 ML | Refills: 11 | Status: SHIPPED | OUTPATIENT
Start: 2017-01-01 | End: 2018-04-19

## 2017-01-01 RX ORDER — ASPIRIN 325 MG
325 TABLET ORAL DAILY
Status: DISCONTINUED | OUTPATIENT
Start: 2017-01-01 | End: 2017-01-01

## 2017-01-01 RX ORDER — GLUCAGON 1 MG
1 KIT INJECTION
Status: DISCONTINUED | OUTPATIENT
Start: 2017-01-01 | End: 2017-01-01 | Stop reason: HOSPADM

## 2017-01-01 RX ORDER — ISOSORBIDE MONONITRATE 60 MG/1
60 TABLET, EXTENDED RELEASE ORAL DAILY
Qty: 30 TABLET | Refills: 3 | Status: SHIPPED | OUTPATIENT
Start: 2017-01-01

## 2017-01-01 RX ORDER — ASPIRIN 81 MG/1
81 TABLET ORAL DAILY
Status: DISCONTINUED | OUTPATIENT
Start: 2017-01-01 | End: 2017-01-01 | Stop reason: HOSPADM

## 2017-01-01 RX ORDER — INSULIN ASPART 100 [IU]/ML
5 INJECTION, SOLUTION INTRAVENOUS; SUBCUTANEOUS
Status: DISCONTINUED | OUTPATIENT
Start: 2017-01-01 | End: 2017-01-01 | Stop reason: HOSPADM

## 2017-01-01 RX ORDER — AMLODIPINE BESYLATE 10 MG/1
10 TABLET ORAL DAILY
Qty: 30 TABLET | Refills: 5 | Status: SHIPPED | OUTPATIENT
Start: 2017-01-01

## 2017-01-01 RX ORDER — HYDROCODONE BITARTRATE AND ACETAMINOPHEN 10; 325 MG/1; MG/1
1 TABLET ORAL ONCE
Status: COMPLETED | OUTPATIENT
Start: 2017-01-01 | End: 2017-01-01

## 2017-01-01 RX ORDER — SODIUM CHLORIDE 9 MG/ML
INJECTION, SOLUTION INTRAVENOUS ONCE
Status: DISCONTINUED | OUTPATIENT
Start: 2017-01-01 | End: 2017-01-01 | Stop reason: HOSPADM

## 2017-01-01 RX ORDER — ATORVASTATIN CALCIUM 40 MG/1
40 TABLET, FILM COATED ORAL DAILY
Qty: 30 TABLET | Refills: 3 | Status: SHIPPED | OUTPATIENT
Start: 2017-01-01

## 2017-01-01 RX ORDER — ETOMIDATE 2 MG/ML
INJECTION INTRAVENOUS
Status: DISCONTINUED | OUTPATIENT
Start: 2017-01-01 | End: 2017-01-01

## 2017-01-01 RX ORDER — GLUCAGON 1 MG
1 KIT INJECTION
Status: DISCONTINUED | OUTPATIENT
Start: 2017-01-01 | End: 2017-01-01 | Stop reason: SDUPTHER

## 2017-01-01 RX ORDER — SODIUM CHLORIDE 9 MG/ML
INJECTION, SOLUTION INTRAVENOUS
Status: DISCONTINUED | OUTPATIENT
Start: 2017-01-01 | End: 2017-01-01 | Stop reason: HOSPADM

## 2017-01-01 RX ORDER — SODIUM CITRATE AND CITRIC ACID MONOHYDRATE 334; 500 MG/5ML; MG/5ML
30 SOLUTION ORAL 3 TIMES DAILY
Status: DISCONTINUED | OUTPATIENT
Start: 2017-01-01 | End: 2017-01-01 | Stop reason: HOSPADM

## 2017-01-01 RX ORDER — CARVEDILOL 25 MG/1
25 TABLET ORAL 2 TIMES DAILY WITH MEALS
Qty: 60 TABLET | Refills: 11 | Status: ON HOLD | OUTPATIENT
Start: 2017-01-01 | End: 2017-01-01 | Stop reason: HOSPADM

## 2017-01-01 RX ORDER — ATORVASTATIN CALCIUM 40 MG/1
40 TABLET, FILM COATED ORAL DAILY
Status: DISCONTINUED | OUTPATIENT
Start: 2017-01-01 | End: 2017-01-01 | Stop reason: HOSPADM

## 2017-01-01 RX ORDER — HYDROCODONE BITARTRATE AND ACETAMINOPHEN 5; 325 MG/1; MG/1
1 TABLET ORAL EVERY 4 HOURS PRN
Status: DISCONTINUED | OUTPATIENT
Start: 2017-01-01 | End: 2017-01-01

## 2017-01-01 RX ORDER — MORPHINE SULFATE 10 MG/ML
4 INJECTION INTRAMUSCULAR; INTRAVENOUS; SUBCUTANEOUS EVERY 4 HOURS PRN
Status: DISCONTINUED | OUTPATIENT
Start: 2017-01-01 | End: 2017-01-01 | Stop reason: HOSPADM

## 2017-01-01 RX ORDER — INSULIN ASPART 100 [IU]/ML
1-10 INJECTION, SOLUTION INTRAVENOUS; SUBCUTANEOUS EVERY 6 HOURS PRN
Status: DISCONTINUED | OUTPATIENT
Start: 2017-01-01 | End: 2017-01-01

## 2017-01-01 RX ORDER — LISINOPRIL 20 MG/1
20 TABLET ORAL DAILY
Status: DISCONTINUED | OUTPATIENT
Start: 2017-01-01 | End: 2017-01-01

## 2017-01-01 RX ORDER — ACETAMINOPHEN 325 MG/1
650 TABLET ORAL EVERY 6 HOURS PRN
Status: DISCONTINUED | OUTPATIENT
Start: 2017-01-01 | End: 2017-01-01 | Stop reason: HOSPADM

## 2017-01-01 RX ORDER — HYDROCODONE BITARTRATE AND ACETAMINOPHEN 10; 325 MG/1; MG/1
1 TABLET ORAL EVERY 4 HOURS PRN
Status: DISCONTINUED | OUTPATIENT
Start: 2017-01-01 | End: 2017-01-01 | Stop reason: HOSPADM

## 2017-01-01 RX ORDER — TRAZODONE HYDROCHLORIDE 50 MG/1
50 TABLET ORAL NIGHTLY PRN
Status: DISCONTINUED | OUTPATIENT
Start: 2017-01-01 | End: 2017-01-01 | Stop reason: HOSPADM

## 2017-01-01 RX ORDER — AMLODIPINE BESYLATE 5 MG/1
10 TABLET ORAL DAILY
Status: DISCONTINUED | OUTPATIENT
Start: 2017-01-01 | End: 2017-01-01

## 2017-01-01 RX ORDER — IBUPROFEN 200 MG
16 TABLET ORAL
Status: DISCONTINUED | OUTPATIENT
Start: 2017-01-01 | End: 2017-01-01 | Stop reason: HOSPADM

## 2017-01-01 RX ORDER — MUPIROCIN 20 MG/G
OINTMENT TOPICAL
Status: DISCONTINUED | OUTPATIENT
Start: 2017-01-01 | End: 2017-01-01 | Stop reason: HOSPADM

## 2017-01-01 RX ORDER — CLOPIDOGREL BISULFATE 75 MG/1
75 TABLET ORAL DAILY
Qty: 30 TABLET | Refills: 3 | Status: SHIPPED | OUTPATIENT
Start: 2017-01-01 | End: 2018-09-18

## 2017-01-01 RX ORDER — AMLODIPINE BESYLATE 5 MG/1
10 TABLET ORAL DAILY
Status: DISCONTINUED | OUTPATIENT
Start: 2017-01-01 | End: 2017-01-01 | Stop reason: HOSPADM

## 2017-01-01 RX ORDER — TRAZODONE HYDROCHLORIDE 50 MG/1
100 TABLET ORAL ONCE
Status: COMPLETED | OUTPATIENT
Start: 2017-01-01 | End: 2017-01-01

## 2017-01-01 RX ORDER — SEVELAMER CARBONATE 800 MG/1
800 TABLET, FILM COATED ORAL
Status: DISCONTINUED | OUTPATIENT
Start: 2017-01-01 | End: 2017-01-01

## 2017-01-01 RX ORDER — ALBUTEROL SULFATE 0.83 MG/ML
2.5 SOLUTION RESPIRATORY (INHALATION) EVERY 8 HOURS
Status: DISCONTINUED | OUTPATIENT
Start: 2017-01-01 | End: 2017-01-01

## 2017-01-01 RX ORDER — DEXTROSE 50 % IN WATER (D50W) INTRAVENOUS SYRINGE
Status: COMPLETED
Start: 2017-01-01 | End: 2017-01-01

## 2017-01-01 RX ORDER — LEVOTHYROXINE SODIUM 125 UG/1
125 TABLET ORAL
Qty: 30 TABLET | Refills: 3 | Status: SHIPPED | OUTPATIENT
Start: 2017-01-01 | End: 2018-09-18

## 2017-01-01 RX ORDER — SEVELAMER CARBONATE 800 MG/1
800 TABLET, FILM COATED ORAL
Status: DISCONTINUED | OUTPATIENT
Start: 2017-01-01 | End: 2017-01-01 | Stop reason: HOSPADM

## 2017-01-01 RX ORDER — SODIUM BICARBONATE 1 MEQ/ML
SYRINGE (ML) INTRAVENOUS
Status: DISCONTINUED | OUTPATIENT
Start: 2017-01-01 | End: 2017-01-01

## 2017-01-01 RX ORDER — CEFAZOLIN SODIUM 2 G/50ML
2 SOLUTION INTRAVENOUS
Status: DISCONTINUED | OUTPATIENT
Start: 2017-01-01 | End: 2017-01-01 | Stop reason: HOSPADM

## 2017-01-01 RX ORDER — ALBUMIN HUMAN 250 G/1000ML
25 SOLUTION INTRAVENOUS ONCE
Status: COMPLETED | OUTPATIENT
Start: 2017-01-01 | End: 2017-01-01

## 2017-01-01 RX ORDER — POLYETHYLENE GLYCOL 3350 17 G/17G
17 POWDER, FOR SOLUTION ORAL DAILY
Status: DISCONTINUED | OUTPATIENT
Start: 2017-01-01 | End: 2017-01-01 | Stop reason: HOSPADM

## 2017-01-01 RX ORDER — INSULIN ASPART 100 [IU]/ML
10 INJECTION, SOLUTION INTRAVENOUS; SUBCUTANEOUS ONCE
Status: COMPLETED | OUTPATIENT
Start: 2017-01-01 | End: 2017-01-01

## 2017-01-01 RX ORDER — PHENYLEPHRINE HYDROCHLORIDE 10 MG/ML
INJECTION INTRAVENOUS
Status: DISCONTINUED | OUTPATIENT
Start: 2017-01-01 | End: 2017-01-01

## 2017-01-01 RX ORDER — CARVEDILOL 6.25 MG/1
25 TABLET ORAL 2 TIMES DAILY WITH MEALS
Status: DISCONTINUED | OUTPATIENT
Start: 2017-01-01 | End: 2017-01-01 | Stop reason: HOSPADM

## 2017-01-01 RX ORDER — ATORVASTATIN CALCIUM 40 MG/1
40 TABLET, FILM COATED ORAL NIGHTLY
Status: DISCONTINUED | OUTPATIENT
Start: 2017-01-01 | End: 2017-01-01 | Stop reason: HOSPADM

## 2017-01-01 RX ORDER — HYDROXYZINE HYDROCHLORIDE 25 MG/1
25 TABLET, FILM COATED ORAL 4 TIMES DAILY PRN
Status: DISCONTINUED | OUTPATIENT
Start: 2017-01-01 | End: 2017-01-01 | Stop reason: HOSPADM

## 2017-01-01 RX ORDER — INSULIN ASPART 100 [IU]/ML
0-5 INJECTION, SOLUTION INTRAVENOUS; SUBCUTANEOUS
Status: DISCONTINUED | OUTPATIENT
Start: 2017-01-01 | End: 2017-01-01 | Stop reason: HOSPADM

## 2017-01-01 RX ORDER — HEPARIN SODIUM 5000 [USP'U]/ML
5000 INJECTION, SOLUTION INTRAVENOUS; SUBCUTANEOUS EVERY 8 HOURS
Status: DISCONTINUED | OUTPATIENT
Start: 2017-01-01 | End: 2017-01-01 | Stop reason: HOSPADM

## 2017-01-01 RX ORDER — CARVEDILOL 3.12 MG/1
3.12 TABLET ORAL 2 TIMES DAILY WITH MEALS
Qty: 60 TABLET | Refills: 5 | Status: ON HOLD | OUTPATIENT
Start: 2017-01-01 | End: 2017-01-01

## 2017-01-01 RX ORDER — INSULIN ASPART 100 [IU]/ML
1-10 INJECTION, SOLUTION INTRAVENOUS; SUBCUTANEOUS
Status: DISCONTINUED | OUTPATIENT
Start: 2017-01-01 | End: 2017-01-01

## 2017-01-01 RX ORDER — ASPIRIN 325 MG
325 TABLET ORAL
Status: COMPLETED | OUTPATIENT
Start: 2017-01-01 | End: 2017-01-01

## 2017-01-01 RX ORDER — SEVELAMER CARBONATE 800 MG/1
1600 TABLET, FILM COATED ORAL
Status: DISCONTINUED | OUTPATIENT
Start: 2017-01-01 | End: 2017-01-01

## 2017-01-01 RX ORDER — ESMOLOL HYDROCHLORIDE 10 MG/ML
INJECTION INTRAVENOUS
Status: DISCONTINUED | OUTPATIENT
Start: 2017-01-01 | End: 2017-01-01

## 2017-01-01 RX ORDER — NALOXONE HCL 0.4 MG/ML
VIAL (ML) INJECTION
Status: COMPLETED
Start: 2017-01-01 | End: 2017-01-01

## 2017-01-01 RX ORDER — HYDROMORPHONE HYDROCHLORIDE 2 MG/ML
0.5 INJECTION, SOLUTION INTRAMUSCULAR; INTRAVENOUS; SUBCUTANEOUS ONCE
Status: COMPLETED | OUTPATIENT
Start: 2017-01-01 | End: 2017-01-01

## 2017-01-01 RX ORDER — PREGABALIN 75 MG/1
75 CAPSULE ORAL 2 TIMES DAILY
Status: DISCONTINUED | OUTPATIENT
Start: 2017-01-01 | End: 2017-01-01 | Stop reason: HOSPADM

## 2017-01-01 RX ORDER — HYDRALAZINE HYDROCHLORIDE 20 MG/ML
10 INJECTION INTRAMUSCULAR; INTRAVENOUS EVERY 4 HOURS PRN
Status: DISCONTINUED | OUTPATIENT
Start: 2017-01-01 | End: 2017-01-01 | Stop reason: HOSPADM

## 2017-01-01 RX ORDER — BISACODYL 10 MG
10 SUPPOSITORY, RECTAL RECTAL DAILY PRN
Status: DISCONTINUED | OUTPATIENT
Start: 2017-01-01 | End: 2017-01-01 | Stop reason: HOSPADM

## 2017-01-01 RX ORDER — ASPIRIN 81 MG/1
81 TABLET ORAL DAILY
Status: DISCONTINUED | OUTPATIENT
Start: 2017-01-01 | End: 2017-01-01

## 2017-01-01 RX ORDER — CARVEDILOL 3.12 MG/1
3.12 TABLET ORAL 2 TIMES DAILY WITH MEALS
Qty: 60 TABLET | Refills: 3 | Status: SHIPPED | OUTPATIENT
Start: 2017-01-01 | End: 2018-09-17

## 2017-01-01 RX ORDER — HEPARIN SODIUM 5000 [USP'U]/ML
5000 INJECTION, SOLUTION INTRAVENOUS; SUBCUTANEOUS EVERY 12 HOURS
Status: DISCONTINUED | OUTPATIENT
Start: 2017-01-01 | End: 2017-01-01

## 2017-01-01 RX ORDER — MAGNESIUM SULFATE HEPTAHYDRATE 40 MG/ML
4 INJECTION, SOLUTION INTRAVENOUS ONCE
Status: COMPLETED | OUTPATIENT
Start: 2017-01-01 | End: 2017-01-01

## 2017-01-01 RX ORDER — IBUPROFEN 200 MG
16 TABLET ORAL
Status: DISCONTINUED | OUTPATIENT
Start: 2017-01-01 | End: 2017-01-01 | Stop reason: SDUPTHER

## 2017-01-01 RX ORDER — PREGABALIN 75 MG/1
75 CAPSULE ORAL 2 TIMES DAILY
COMMUNITY

## 2017-01-01 RX ORDER — IBUPROFEN 200 MG
24 TABLET ORAL
Status: DISCONTINUED | OUTPATIENT
Start: 2017-01-01 | End: 2017-01-01 | Stop reason: HOSPADM

## 2017-01-01 RX ORDER — AMOXICILLIN 250 MG
1 CAPSULE ORAL 2 TIMES DAILY
Status: DISCONTINUED | OUTPATIENT
Start: 2017-01-01 | End: 2017-01-01 | Stop reason: HOSPADM

## 2017-01-01 RX ORDER — CLOPIDOGREL BISULFATE 75 MG/1
75 TABLET ORAL DAILY
Qty: 30 TABLET | Refills: 0 | Status: ON HOLD | OUTPATIENT
Start: 2017-01-01 | End: 2017-01-01

## 2017-01-01 RX ORDER — MORPHINE SULFATE 2 MG/ML
INJECTION, SOLUTION INTRAMUSCULAR; INTRAVENOUS
Status: DISPENSED
Start: 2017-01-01 | End: 2017-01-01

## 2017-01-01 RX ORDER — LABETALOL HYDROCHLORIDE 5 MG/ML
INJECTION, SOLUTION INTRAVENOUS
Status: DISCONTINUED | OUTPATIENT
Start: 2017-01-01 | End: 2017-01-01

## 2017-01-01 RX ORDER — LISINOPRIL 20 MG/1
20 TABLET ORAL DAILY
Status: DISCONTINUED | OUTPATIENT
Start: 2017-01-01 | End: 2017-01-01 | Stop reason: HOSPADM

## 2017-01-01 RX ORDER — CARVEDILOL 3.12 MG/1
3.12 TABLET ORAL 2 TIMES DAILY WITH MEALS
Status: DISCONTINUED | OUTPATIENT
Start: 2017-01-01 | End: 2017-01-01

## 2017-01-01 RX ORDER — HYDROCODONE BITARTRATE AND ACETAMINOPHEN 5; 325 MG/1; MG/1
1 TABLET ORAL EVERY 6 HOURS PRN
Status: DISCONTINUED | OUTPATIENT
Start: 2017-01-01 | End: 2017-01-01 | Stop reason: HOSPADM

## 2017-01-01 RX ORDER — DOCUSATE SODIUM 100 MG/1
100 CAPSULE, LIQUID FILLED ORAL DAILY PRN
Status: DISCONTINUED | OUTPATIENT
Start: 2017-01-01 | End: 2017-01-01 | Stop reason: HOSPADM

## 2017-01-01 RX ORDER — ALLOPURINOL 100 MG/1
100 TABLET ORAL EVERY MORNING
Status: DISCONTINUED | OUTPATIENT
Start: 2017-01-01 | End: 2017-01-01 | Stop reason: HOSPADM

## 2017-01-01 RX ORDER — CARVEDILOL 3.12 MG/1
3.12 TABLET ORAL 2 TIMES DAILY
Status: DISCONTINUED | OUTPATIENT
Start: 2017-01-01 | End: 2017-01-01 | Stop reason: HOSPADM

## 2017-01-01 RX ORDER — NAPROXEN SODIUM 220 MG/1
81 TABLET, FILM COATED ORAL DAILY
Status: DISCONTINUED | OUTPATIENT
Start: 2017-01-01 | End: 2017-01-01 | Stop reason: HOSPADM

## 2017-01-01 RX ORDER — INSULIN ASPART 100 [IU]/ML
1-10 INJECTION, SOLUTION INTRAVENOUS; SUBCUTANEOUS
Status: DISCONTINUED | OUTPATIENT
Start: 2017-01-01 | End: 2017-01-01 | Stop reason: HOSPADM

## 2017-01-01 RX ORDER — SODIUM CHLORIDE 9 MG/ML
INJECTION, SOLUTION INTRAVENOUS ONCE
Status: DISCONTINUED | OUTPATIENT
Start: 2017-01-01 | End: 2017-01-01

## 2017-01-01 RX ORDER — HEPARIN 100 UNIT/ML
SYRINGE INTRAVENOUS
Status: COMPLETED
Start: 2017-01-01 | End: 2017-01-01

## 2017-01-01 RX ORDER — MORPHINE SULFATE 2 MG/ML
2 INJECTION, SOLUTION INTRAMUSCULAR; INTRAVENOUS ONCE
Status: COMPLETED | OUTPATIENT
Start: 2017-01-01 | End: 2017-01-01

## 2017-01-01 RX ORDER — SODIUM CHLORIDE 9 MG/ML
INJECTION, SOLUTION INTRAVENOUS ONCE
Status: CANCELLED | OUTPATIENT
Start: 2017-01-01 | End: 2017-01-01

## 2017-01-01 RX ORDER — MAGNESIUM SULFATE HEPTAHYDRATE 40 MG/ML
2 INJECTION, SOLUTION INTRAVENOUS
Status: ACTIVE | OUTPATIENT
Start: 2017-01-01 | End: 2017-01-01

## 2017-01-01 RX ORDER — VASOPRESSIN 20 [USP'U]/ML
INJECTION, SOLUTION INTRAMUSCULAR; SUBCUTANEOUS
Status: DISCONTINUED | OUTPATIENT
Start: 2017-01-01 | End: 2017-01-01

## 2017-01-01 RX ORDER — FUROSEMIDE 40 MG/1
80 TABLET ORAL DAILY
Status: DISCONTINUED | OUTPATIENT
Start: 2017-01-01 | End: 2017-01-01 | Stop reason: HOSPADM

## 2017-01-01 RX ORDER — OXYCODONE AND ACETAMINOPHEN 10; 325 MG/1; MG/1
1 TABLET ORAL EVERY 6 HOURS PRN
COMMUNITY

## 2017-01-01 RX ORDER — FUROSEMIDE 10 MG/ML
80 INJECTION INTRAMUSCULAR; INTRAVENOUS 2 TIMES DAILY
Status: DISCONTINUED | OUTPATIENT
Start: 2017-01-01 | End: 2017-01-01

## 2017-01-01 RX ORDER — MIDAZOLAM HYDROCHLORIDE 1 MG/ML
INJECTION, SOLUTION INTRAMUSCULAR; INTRAVENOUS
Status: DISCONTINUED | OUTPATIENT
Start: 2017-01-01 | End: 2017-01-01

## 2017-01-01 RX ORDER — NALOXONE HCL 0.4 MG/ML
0.4 VIAL (ML) INJECTION
Status: DISCONTINUED | OUTPATIENT
Start: 2017-01-01 | End: 2017-01-01

## 2017-01-01 RX ORDER — CLOPIDOGREL BISULFATE 75 MG/1
75 TABLET ORAL DAILY
Qty: 30 TABLET | Refills: 5 | Status: SHIPPED | OUTPATIENT
Start: 2017-01-01

## 2017-01-01 RX ORDER — FUROSEMIDE 10 MG/ML
80 INJECTION INTRAMUSCULAR; INTRAVENOUS
Status: COMPLETED | OUTPATIENT
Start: 2017-01-01 | End: 2017-01-01

## 2017-01-01 RX ORDER — LISINOPRIL 20 MG/1
20 TABLET ORAL DAILY
Qty: 30 TABLET | Refills: 0 | Status: ON HOLD | OUTPATIENT
Start: 2017-01-01 | End: 2017-01-01

## 2017-01-01 RX ORDER — AMLODIPINE BESYLATE 10 MG/1
10 TABLET ORAL DAILY
Qty: 30 TABLET | Refills: 0 | Status: ON HOLD | OUTPATIENT
Start: 2017-01-01 | End: 2017-01-01

## 2017-01-01 RX ORDER — ACETAMINOPHEN 325 MG/1
650 TABLET ORAL EVERY 4 HOURS PRN
Status: DISCONTINUED | OUTPATIENT
Start: 2017-01-01 | End: 2017-01-01 | Stop reason: HOSPADM

## 2017-01-01 RX ORDER — LUBIPROSTONE 8 UG/1
8 CAPSULE ORAL 2 TIMES DAILY
Status: DISCONTINUED | OUTPATIENT
Start: 2017-01-01 | End: 2017-01-01

## 2017-01-01 RX ORDER — ISOSORBIDE MONONITRATE 60 MG/1
60 TABLET, EXTENDED RELEASE ORAL DAILY
Status: DISCONTINUED | OUTPATIENT
Start: 2017-01-01 | End: 2017-01-01

## 2017-01-01 RX ORDER — CALCIUM CHLORIDE INJECTION 100 MG/ML
1 INJECTION, SOLUTION INTRAVENOUS ONCE
Status: COMPLETED | OUTPATIENT
Start: 2017-01-01 | End: 2017-01-01

## 2017-01-01 RX ORDER — HEPARIN SODIUM 5000 [USP'U]/ML
5000 INJECTION, SOLUTION INTRAVENOUS; SUBCUTANEOUS EVERY 8 HOURS
Status: DISCONTINUED | OUTPATIENT
Start: 2017-01-01 | End: 2017-01-01 | Stop reason: SDUPTHER

## 2017-01-01 RX ORDER — ISOSORBIDE MONONITRATE 60 MG/1
60 TABLET, EXTENDED RELEASE ORAL DAILY
Status: DISCONTINUED | OUTPATIENT
Start: 2017-01-01 | End: 2017-01-01 | Stop reason: HOSPADM

## 2017-01-01 RX ORDER — CARVEDILOL 25 MG/1
25 TABLET ORAL 2 TIMES DAILY WITH MEALS
Status: DISCONTINUED | OUTPATIENT
Start: 2017-01-01 | End: 2017-01-01 | Stop reason: HOSPADM

## 2017-01-01 RX ORDER — ONDANSETRON 2 MG/ML
INJECTION INTRAMUSCULAR; INTRAVENOUS
Status: DISCONTINUED | OUTPATIENT
Start: 2017-01-01 | End: 2017-01-01

## 2017-01-01 RX ORDER — HYDROCODONE BITARTRATE AND ACETAMINOPHEN 5; 325 MG/1; MG/1
1 TABLET ORAL EVERY 6 HOURS PRN
Qty: 20 TABLET | Refills: 0 | Status: SHIPPED | OUTPATIENT
Start: 2017-01-01

## 2017-01-01 RX ORDER — LIDOCAINE HCL/PF 100 MG/5ML
SYRINGE (ML) INTRAVENOUS
Status: DISCONTINUED | OUTPATIENT
Start: 2017-01-01 | End: 2017-01-01

## 2017-01-01 RX ORDER — ENOXAPARIN SODIUM 100 MG/ML
1 INJECTION SUBCUTANEOUS EVERY 24 HOURS
Status: DISCONTINUED | OUTPATIENT
Start: 2017-01-01 | End: 2017-01-01

## 2017-01-01 RX ORDER — SODIUM CHLORIDE 9 MG/ML
INJECTION, SOLUTION INTRAVENOUS
Status: DISCONTINUED | OUTPATIENT
Start: 2017-01-01 | End: 2017-01-01

## 2017-01-01 RX ORDER — ATROPINE SULFATE 0.1 MG/ML
INJECTION INTRAVENOUS CODE/TRAUMA/SEDATION MEDICATION
Status: COMPLETED | OUTPATIENT
Start: 2017-01-01 | End: 2017-01-01

## 2017-01-01 RX ORDER — SEVELAMER CARBONATE 800 MG/1
1600 TABLET, FILM COATED ORAL
Qty: 180 TABLET | Refills: 3 | Status: SHIPPED | OUTPATIENT
Start: 2017-01-01 | End: 2018-09-17

## 2017-01-01 RX ORDER — ATORVASTATIN CALCIUM 40 MG/1
40 TABLET, FILM COATED ORAL DAILY
Status: DISCONTINUED | OUTPATIENT
Start: 2017-01-01 | End: 2017-01-01

## 2017-01-01 RX ORDER — ASPIRIN 81 MG/1
81 TABLET ORAL DAILY
Refills: 0
Start: 2017-01-01 | End: 2018-03-09

## 2017-01-01 RX ORDER — CEFAZOLIN SODIUM 2 G/50ML
2 SOLUTION INTRAVENOUS
Status: COMPLETED | OUTPATIENT
Start: 2017-01-01 | End: 2017-01-01

## 2017-01-01 RX ORDER — DEXTROMETHORPHAN POLISTIREX 30 MG/5 ML
1 SUSPENSION, EXTENDED RELEASE 12 HR ORAL DAILY PRN
Status: DISCONTINUED | OUTPATIENT
Start: 2017-01-01 | End: 2017-01-01 | Stop reason: HOSPADM

## 2017-01-01 RX ORDER — SODIUM CHLORIDE 9 MG/ML
INJECTION, SOLUTION INTRAVENOUS
Status: ACTIVE | OUTPATIENT
Start: 2017-01-01 | End: 2017-01-01

## 2017-01-01 RX ORDER — HEPARIN SODIUM 5000 [USP'U]/ML
5000 INJECTION, SOLUTION INTRAVENOUS; SUBCUTANEOUS EVERY 12 HOURS
Status: DISCONTINUED | OUTPATIENT
Start: 2017-01-01 | End: 2017-01-01 | Stop reason: HOSPADM

## 2017-01-01 RX ORDER — ONDANSETRON 2 MG/ML
8 INJECTION INTRAMUSCULAR; INTRAVENOUS EVERY 8 HOURS PRN
Status: DISCONTINUED | OUTPATIENT
Start: 2017-01-01 | End: 2017-01-01 | Stop reason: HOSPADM

## 2017-01-01 RX ORDER — INSULIN ASPART 100 [IU]/ML
1-12 INJECTION, SOLUTION INTRAVENOUS; SUBCUTANEOUS
Status: DISCONTINUED | OUTPATIENT
Start: 2017-01-01 | End: 2017-01-01 | Stop reason: HOSPADM

## 2017-01-01 RX ORDER — MIDODRINE HYDROCHLORIDE 5 MG/1
5 TABLET ORAL 2 TIMES DAILY WITH MEALS
Status: DISCONTINUED | OUTPATIENT
Start: 2017-01-01 | End: 2017-01-01

## 2017-01-01 RX ORDER — MAGNESIUM SULFATE HEPTAHYDRATE 40 MG/ML
2 INJECTION, SOLUTION INTRAVENOUS
Status: DISCONTINUED | OUTPATIENT
Start: 2017-01-01 | End: 2017-01-01

## 2017-01-01 RX ORDER — DOCUSATE SODIUM 100 MG/1
100 CAPSULE, LIQUID FILLED ORAL ONCE
Status: DISCONTINUED | OUTPATIENT
Start: 2017-01-01 | End: 2017-01-01

## 2017-01-01 RX ORDER — ALLOPURINOL 100 MG/1
100 TABLET ORAL EVERY MORNING
Status: DISCONTINUED | OUTPATIENT
Start: 2017-01-01 | End: 2017-01-01

## 2017-01-01 RX ORDER — SODIUM CHLORIDE 0.9 % (FLUSH) 0.9 %
3 SYRINGE (ML) INJECTION EVERY 8 HOURS
Status: DISCONTINUED | OUTPATIENT
Start: 2017-01-01 | End: 2017-01-01 | Stop reason: HOSPADM

## 2017-01-01 RX ORDER — HEPARIN 100 UNIT/ML
SYRINGE INTRAVENOUS
Status: DISPENSED
Start: 2017-01-01 | End: 2017-01-01

## 2017-01-01 RX ORDER — SODIUM CHLORIDE 0.9 % (FLUSH) 0.9 %
3 SYRINGE (ML) INJECTION
Status: DISCONTINUED | OUTPATIENT
Start: 2017-01-01 | End: 2017-01-01 | Stop reason: HOSPADM

## 2017-01-01 RX ORDER — LEVOTHYROXINE SODIUM 125 UG/1
125 TABLET ORAL DAILY
Status: DISCONTINUED | OUTPATIENT
Start: 2017-01-01 | End: 2017-01-01 | Stop reason: HOSPADM

## 2017-01-01 RX ORDER — HYDRALAZINE HYDROCHLORIDE 20 MG/ML
10 INJECTION INTRAMUSCULAR; INTRAVENOUS EVERY 4 HOURS PRN
Status: DISCONTINUED | OUTPATIENT
Start: 2017-01-01 | End: 2017-01-01

## 2017-01-01 RX ORDER — HYDROCODONE BITARTRATE AND ACETAMINOPHEN 500; 5 MG/1; MG/1
TABLET ORAL
Status: DISCONTINUED | OUTPATIENT
Start: 2017-01-01 | End: 2017-01-01

## 2017-01-01 RX ORDER — FUROSEMIDE 10 MG/ML
80 INJECTION INTRAMUSCULAR; INTRAVENOUS 4 TIMES DAILY
Status: DISCONTINUED | OUTPATIENT
Start: 2017-01-01 | End: 2017-01-01

## 2017-01-01 RX ORDER — HEPARIN SODIUM 1000 [USP'U]/ML
INJECTION, SOLUTION INTRAVENOUS; SUBCUTANEOUS
Status: DISCONTINUED | OUTPATIENT
Start: 2017-01-01 | End: 2017-01-01

## 2017-01-01 RX ORDER — PROMETHAZINE HYDROCHLORIDE 25 MG/1
25 SUPPOSITORY RECTAL EVERY 6 HOURS PRN
Status: DISCONTINUED | OUTPATIENT
Start: 2017-01-01 | End: 2017-01-01 | Stop reason: HOSPADM

## 2017-01-01 RX ORDER — LUBIPROSTONE 8 UG/1
8 CAPSULE ORAL 2 TIMES DAILY
Status: DISCONTINUED | OUTPATIENT
Start: 2017-01-01 | End: 2017-01-01 | Stop reason: HOSPADM

## 2017-01-01 RX ORDER — SEVELAMER CARBONATE 800 MG/1
1600 TABLET, FILM COATED ORAL
Status: DISCONTINUED | OUTPATIENT
Start: 2017-01-01 | End: 2017-01-01 | Stop reason: HOSPADM

## 2017-01-01 RX ORDER — SODIUM CHLORIDE 9 MG/ML
INJECTION, SOLUTION INTRAVENOUS CONTINUOUS PRN
Status: DISCONTINUED | OUTPATIENT
Start: 2017-01-01 | End: 2017-01-01

## 2017-01-01 RX ORDER — FUROSEMIDE 40 MG/1
80 TABLET ORAL DAILY
Status: DISCONTINUED | OUTPATIENT
Start: 2017-01-01 | End: 2017-01-01

## 2017-01-01 RX ORDER — ALBUTEROL SULFATE 2.5 MG/.5ML
2.5 SOLUTION RESPIRATORY (INHALATION) EVERY 8 HOURS
Status: DISCONTINUED | OUTPATIENT
Start: 2017-01-01 | End: 2017-01-01

## 2017-01-01 RX ORDER — EPINEPHRINE 0.1 MG/ML
INJECTION INTRAVENOUS
Status: DISCONTINUED | OUTPATIENT
Start: 2017-01-01 | End: 2017-01-01

## 2017-01-01 RX ORDER — MORPHINE SULFATE 2 MG/ML
2 INJECTION, SOLUTION INTRAMUSCULAR; INTRAVENOUS ONCE
Status: DISCONTINUED | OUTPATIENT
Start: 2017-01-01 | End: 2017-01-01

## 2017-01-01 RX ORDER — FENTANYL CITRAT/DEXTROSE 5%/PF 100 MCG/10
PATIENT CONTROLLED ANALGESIA SYRINGE INTRAVENOUS CONTINUOUS
Status: DISCONTINUED | OUTPATIENT
Start: 2017-01-01 | End: 2017-01-01 | Stop reason: HOSPADM

## 2017-01-01 RX ORDER — INSULIN ASPART 100 [IU]/ML
1-10 INJECTION, SOLUTION INTRAVENOUS; SUBCUTANEOUS
Status: DISCONTINUED | OUTPATIENT
Start: 2017-01-01 | End: 2017-01-01 | Stop reason: SDUPTHER

## 2017-01-01 RX ORDER — LISINOPRIL 20 MG/1
20 TABLET ORAL DAILY
Qty: 30 TABLET | Refills: 5 | Status: SHIPPED | OUTPATIENT
Start: 2017-01-01

## 2017-01-01 RX ORDER — RAMELTEON 8 MG/1
8 TABLET ORAL NIGHTLY PRN
Status: DISCONTINUED | OUTPATIENT
Start: 2017-01-01 | End: 2017-01-01 | Stop reason: HOSPADM

## 2017-01-01 RX ORDER — NOREPINEPHRINE BITARTRATE/D5W 16MG/250ML
0.02 PLASTIC BAG, INJECTION (ML) INTRAVENOUS CONTINUOUS
Status: DISCONTINUED | OUTPATIENT
Start: 2017-01-01 | End: 2017-01-01 | Stop reason: HOSPADM

## 2017-01-01 RX ORDER — SEVELAMER CARBONATE 800 MG/1
800 TABLET, FILM COATED ORAL
Qty: 90 TABLET | Refills: 0 | Status: SHIPPED | OUTPATIENT
Start: 2017-01-01 | End: 2017-01-01

## 2017-01-01 RX ORDER — SODIUM CHLORIDE 9 MG/ML
INJECTION, SOLUTION INTRAVENOUS CONTINUOUS
Status: DISCONTINUED | OUTPATIENT
Start: 2017-01-01 | End: 2017-01-01 | Stop reason: HOSPADM

## 2017-01-01 RX ORDER — HYDROCODONE BITARTRATE AND ACETAMINOPHEN 5; 325 MG/1; MG/1
1 TABLET ORAL
Status: COMPLETED | OUTPATIENT
Start: 2017-01-01 | End: 2017-01-01

## 2017-01-01 RX ORDER — AMLODIPINE BESYLATE 5 MG/1
5 TABLET ORAL DAILY
Status: DISCONTINUED | OUTPATIENT
Start: 2017-01-01 | End: 2017-01-01 | Stop reason: HOSPADM

## 2017-01-01 RX ORDER — METOPROLOL TARTRATE 1 MG/ML
INJECTION, SOLUTION INTRAVENOUS
Status: DISCONTINUED | OUTPATIENT
Start: 2017-01-01 | End: 2017-01-01

## 2017-01-01 RX ORDER — BACITRACIN 50000 [IU]/1
INJECTION, POWDER, FOR SOLUTION INTRAMUSCULAR
Status: DISCONTINUED | OUTPATIENT
Start: 2017-01-01 | End: 2017-01-01 | Stop reason: HOSPADM

## 2017-01-01 RX ORDER — HEPARIN SODIUM 1000 [USP'U]/ML
INJECTION, SOLUTION INTRAVENOUS; SUBCUTANEOUS
Status: DISCONTINUED | OUTPATIENT
Start: 2017-01-01 | End: 2017-01-01 | Stop reason: HOSPADM

## 2017-01-01 RX ORDER — CEFAZOLIN SODIUM 1 G/3ML
INJECTION, POWDER, FOR SOLUTION INTRAMUSCULAR; INTRAVENOUS
Status: DISCONTINUED | OUTPATIENT
Start: 2017-01-01 | End: 2017-01-01

## 2017-01-01 RX ORDER — ALBUTEROL SULFATE 0.83 MG/ML
10 SOLUTION RESPIRATORY (INHALATION) ONCE
Status: COMPLETED | OUTPATIENT
Start: 2017-01-01 | End: 2017-01-01

## 2017-01-01 RX ORDER — LIDOCAINE HYDROCHLORIDE 10 MG/ML
INJECTION, SOLUTION EPIDURAL; INFILTRATION; INTRACAUDAL; PERINEURAL
Status: DISCONTINUED | OUTPATIENT
Start: 2017-01-01 | End: 2017-01-01 | Stop reason: HOSPADM

## 2017-01-01 RX ORDER — ALBUTEROL SULFATE 2.5 MG/.5ML
2.5 SOLUTION RESPIRATORY (INHALATION) EVERY 8 HOURS
Status: DISCONTINUED | OUTPATIENT
Start: 2017-01-01 | End: 2017-01-01 | Stop reason: HOSPADM

## 2017-01-01 RX ORDER — MUPIROCIN 20 MG/G
OINTMENT TOPICAL
Status: DISCONTINUED | OUTPATIENT
Start: 2017-01-01 | End: 2017-01-01

## 2017-01-01 RX ORDER — ALBUTEROL SULFATE 0.83 MG/ML
5 SOLUTION RESPIRATORY (INHALATION) ONCE
Status: COMPLETED | OUTPATIENT
Start: 2017-01-01 | End: 2017-01-01

## 2017-01-01 RX ORDER — FUROSEMIDE 80 MG/1
80 TABLET ORAL DAILY
Qty: 90 TABLET | Refills: 3 | Status: SHIPPED | OUTPATIENT
Start: 2017-01-01

## 2017-01-01 RX ORDER — FUROSEMIDE 10 MG/ML
40 INJECTION INTRAMUSCULAR; INTRAVENOUS 2 TIMES DAILY
Status: DISCONTINUED | OUTPATIENT
Start: 2017-01-01 | End: 2017-01-01

## 2017-01-01 RX ORDER — HALOPERIDOL 5 MG/ML
5 INJECTION INTRAMUSCULAR ONCE
Status: COMPLETED | OUTPATIENT
Start: 2017-01-01 | End: 2017-01-01

## 2017-01-01 RX ORDER — LUBIPROSTONE 8 UG/1
8 CAPSULE ORAL
Status: DISCONTINUED | OUTPATIENT
Start: 2017-01-01 | End: 2017-01-01 | Stop reason: HOSPADM

## 2017-01-01 RX ORDER — PROPOFOL 10 MG/ML
VIAL (ML) INTRAVENOUS CONTINUOUS PRN
Status: DISCONTINUED | OUTPATIENT
Start: 2017-01-01 | End: 2017-01-01

## 2017-01-01 RX ADMIN — CARVEDILOL 25 MG: 6.25 TABLET, FILM COATED ORAL at 07:03

## 2017-01-01 RX ADMIN — VASOPRESSIN 2 UNITS: 20 INJECTION, SOLUTION INTRAMUSCULAR; SUBCUTANEOUS at 01:12

## 2017-01-01 RX ADMIN — INSULIN HUMAN 10 UNITS: 100 INJECTION, SOLUTION PARENTERAL at 06:12

## 2017-01-01 RX ADMIN — ENOXAPARIN SODIUM 80 MG: 80 INJECTION SUBCUTANEOUS at 11:03

## 2017-01-01 RX ADMIN — Medication 1 CAPSULE: at 08:09

## 2017-01-01 RX ADMIN — INSULIN DETEMIR 7 UNITS: 100 INJECTION, SOLUTION SUBCUTANEOUS at 08:03

## 2017-01-01 RX ADMIN — PREGABALIN 75 MG: 50 CAPSULE ORAL at 09:09

## 2017-01-01 RX ADMIN — ATORVASTATIN CALCIUM 40 MG: 40 TABLET, FILM COATED ORAL at 08:07

## 2017-01-01 RX ADMIN — AMLODIPINE BESYLATE 10 MG: 5 TABLET ORAL at 08:03

## 2017-01-01 RX ADMIN — ASPIRIN 81 MG 81 MG: 81 TABLET ORAL at 08:07

## 2017-01-01 RX ADMIN — PREGABALIN 75 MG: 75 CAPSULE ORAL at 09:12

## 2017-01-01 RX ADMIN — EPHEDRINE SULFATE 10 MG: 50 INJECTION, SOLUTION INTRAMUSCULAR; INTRAVENOUS; SUBCUTANEOUS at 01:12

## 2017-01-01 RX ADMIN — MIDAZOLAM 1 MG: 1 INJECTION INTRAMUSCULAR; INTRAVENOUS at 01:12

## 2017-01-01 RX ADMIN — ALBUTEROL SULFATE 61 MG: 2.5 SOLUTION RESPIRATORY (INHALATION) at 09:07

## 2017-01-01 RX ADMIN — FUROSEMIDE 80 MG: 40 TABLET ORAL at 09:11

## 2017-01-01 RX ADMIN — INSULIN ASPART 5 UNITS: 100 INJECTION, SOLUTION INTRAVENOUS; SUBCUTANEOUS at 08:12

## 2017-01-01 RX ADMIN — LEVOTHYROXINE SODIUM 125 MCG: 25 TABLET ORAL at 06:11

## 2017-01-01 RX ADMIN — MORPHINE SULFATE 2 MG: 2 INJECTION, SOLUTION INTRAMUSCULAR; INTRAVENOUS at 07:12

## 2017-01-01 RX ADMIN — PREGABALIN 75 MG: 50 CAPSULE ORAL at 08:09

## 2017-01-01 RX ADMIN — ASPIRIN 81 MG: 81 TABLET, COATED ORAL at 08:09

## 2017-01-01 RX ADMIN — CLOPIDOGREL BISULFATE 75 MG: 75 TABLET ORAL at 08:03

## 2017-01-01 RX ADMIN — CLOPIDOGREL BISULFATE 75 MG: 75 TABLET ORAL at 08:09

## 2017-01-01 RX ADMIN — HYDRALAZINE HYDROCHLORIDE 10 MG: 20 INJECTION INTRAMUSCULAR; INTRAVENOUS at 06:03

## 2017-01-01 RX ADMIN — Medication 0.02 MCG/KG/MIN: at 12:12

## 2017-01-01 RX ADMIN — DOCUSATE SODIUM AND SENNOSIDES 1 TABLET: 8.6; 5 TABLET, FILM COATED ORAL at 08:03

## 2017-01-01 RX ADMIN — ALBUMIN (HUMAN) 25 G: 12.5 SOLUTION INTRAVENOUS at 09:09

## 2017-01-01 RX ADMIN — INSULIN ASPART 5 UNITS: 100 INJECTION, SOLUTION INTRAVENOUS; SUBCUTANEOUS at 04:07

## 2017-01-01 RX ADMIN — INSULIN DETEMIR 10 UNITS: 100 INJECTION, SOLUTION SUBCUTANEOUS at 10:11

## 2017-01-01 RX ADMIN — PANTOPRAZOLE SODIUM 40 MG: 40 TABLET, DELAYED RELEASE ORAL at 09:12

## 2017-01-01 RX ADMIN — LUBIPROSTONE 8 MCG: 8 CAPSULE, GELATIN COATED ORAL at 10:11

## 2017-01-01 RX ADMIN — PANTOPRAZOLE SODIUM 40 MG: 40 TABLET, DELAYED RELEASE ORAL at 08:12

## 2017-01-01 RX ADMIN — LEVOTHYROXINE SODIUM 125 MCG: 25 TABLET ORAL at 06:12

## 2017-01-01 RX ADMIN — ALBUTEROL SULFATE 2.5 MG: 2.5 SOLUTION RESPIRATORY (INHALATION) at 11:03

## 2017-01-01 RX ADMIN — HYDROCORTISONE SODIUM SUCCINATE 100 MG: 100 INJECTION, POWDER, FOR SOLUTION INTRAMUSCULAR; INTRAVENOUS at 05:12

## 2017-01-01 RX ADMIN — INSULIN ASPART 5 UNITS: 100 INJECTION, SOLUTION INTRAVENOUS; SUBCUTANEOUS at 05:11

## 2017-01-01 RX ADMIN — PANTOPRAZOLE SODIUM 40 MG: 40 TABLET, DELAYED RELEASE ORAL at 08:09

## 2017-01-01 RX ADMIN — INSULIN ASPART 6 UNITS: 100 INJECTION, SOLUTION INTRAVENOUS; SUBCUTANEOUS at 05:12

## 2017-01-01 RX ADMIN — SEVELAMER CARBONATE 800 MG: 800 TABLET, FILM COATED ORAL at 04:07

## 2017-01-01 RX ADMIN — SODIUM CHLORIDE 7 UNITS/HR: 9 INJECTION, SOLUTION INTRAVENOUS at 03:04

## 2017-01-01 RX ADMIN — ISOSORBIDE MONONITRATE 60 MG: 60 TABLET, EXTENDED RELEASE ORAL at 03:04

## 2017-01-01 RX ADMIN — SODIUM BICARBONATE 50 MEQ: 84 INJECTION, SOLUTION INTRAVENOUS at 01:12

## 2017-01-01 RX ADMIN — CEFAZOLIN 2 G: 330 INJECTION, POWDER, FOR SOLUTION INTRAMUSCULAR; INTRAVENOUS at 07:11

## 2017-01-01 RX ADMIN — SEVELAMER CARBONATE 800 MG: 800 TABLET, FILM COATED ORAL at 08:03

## 2017-01-01 RX ADMIN — LISINOPRIL 20 MG: 20 TABLET ORAL at 08:03

## 2017-01-01 RX ADMIN — SEVELAMER CARBONATE 800 MG: 800 TABLET, FILM COATED ORAL at 11:04

## 2017-01-01 RX ADMIN — ALLOPURINOL 100 MG: 100 TABLET ORAL at 08:03

## 2017-01-01 RX ADMIN — ALBUTEROL SULFATE 2.5 MG: 2.5 SOLUTION RESPIRATORY (INHALATION) at 07:03

## 2017-01-01 RX ADMIN — LUBIPROSTONE 8 MCG: 8 CAPSULE, GELATIN COATED ORAL at 09:12

## 2017-01-01 RX ADMIN — CARVEDILOL 25 MG: 25 TABLET, FILM COATED ORAL at 06:04

## 2017-01-01 RX ADMIN — INSULIN DETEMIR 10 UNITS: 100 INJECTION, SOLUTION SUBCUTANEOUS at 11:12

## 2017-01-01 RX ADMIN — CARVEDILOL 3.12 MG: 3.12 TABLET, FILM COATED ORAL at 08:07

## 2017-01-01 RX ADMIN — SODIUM CHLORIDE: 0.9 INJECTION, SOLUTION INTRAVENOUS at 12:12

## 2017-01-01 RX ADMIN — EPINEPHRINE 1 MG: 0.1 INJECTION, SOLUTION ENDOTRACHEAL; INTRACARDIAC; INTRAVENOUS at 01:12

## 2017-01-01 RX ADMIN — VANCOMYCIN HYDROCHLORIDE 1250 MG: 1 INJECTION, POWDER, LYOPHILIZED, FOR SOLUTION INTRAVENOUS at 04:12

## 2017-01-01 RX ADMIN — ASPIRIN 325 MG ORAL TABLET 325 MG: 325 PILL ORAL at 05:09

## 2017-01-01 RX ADMIN — INSULIN ASPART 2 UNITS: 100 INJECTION, SOLUTION INTRAVENOUS; SUBCUTANEOUS at 04:03

## 2017-01-01 RX ADMIN — LEVOTHYROXINE SODIUM 125 MCG: 25 TABLET ORAL at 05:09

## 2017-01-01 RX ADMIN — CLOPIDOGREL BISULFATE 75 MG: 75 TABLET ORAL at 08:07

## 2017-01-01 RX ADMIN — HYDROCODONE BITARTRATE AND ACETAMINOPHEN 1 TABLET: 10; 325 TABLET ORAL at 03:12

## 2017-01-01 RX ADMIN — INSULIN ASPART 6 UNITS: 100 INJECTION, SOLUTION INTRAVENOUS; SUBCUTANEOUS at 12:12

## 2017-01-01 RX ADMIN — DOCUSATE SODIUM AND SENNOSIDES 1 TABLET: 8.6; 5 TABLET, FILM COATED ORAL at 09:03

## 2017-01-01 RX ADMIN — VANCOMYCIN HYDROCHLORIDE 1000 MG: 1 INJECTION, POWDER, LYOPHILIZED, FOR SOLUTION INTRAVENOUS at 02:12

## 2017-01-01 RX ADMIN — HEPARIN SODIUM 5000 UNITS: 5000 INJECTION, SOLUTION INTRAVENOUS; SUBCUTANEOUS at 02:07

## 2017-01-01 RX ADMIN — HEPARIN SODIUM 5000 UNITS: 5000 INJECTION, SOLUTION INTRAVENOUS; SUBCUTANEOUS at 01:07

## 2017-01-01 RX ADMIN — ATORVASTATIN CALCIUM 40 MG: 40 TABLET, FILM COATED ORAL at 09:12

## 2017-01-01 RX ADMIN — HYDROCODONE BITARTRATE AND ACETAMINOPHEN 1 TABLET: 5; 325 TABLET ORAL at 05:09

## 2017-01-01 RX ADMIN — INSULIN ASPART 2 UNITS: 100 INJECTION, SOLUTION INTRAVENOUS; SUBCUTANEOUS at 11:07

## 2017-01-01 RX ADMIN — SEVELAMER CARBONATE 800 MG: 800 TABLET, FILM COATED ORAL at 08:09

## 2017-01-01 RX ADMIN — SODIUM CHLORIDE, PRESERVATIVE FREE 3 ML: 5 INJECTION INTRAVENOUS at 06:03

## 2017-01-01 RX ADMIN — HYDROCODONE BITARTRATE AND ACETAMINOPHEN 1 TABLET: 10; 325 TABLET ORAL at 01:12

## 2017-01-01 RX ADMIN — HEPARIN SODIUM 5000 UNITS: 5000 INJECTION, SOLUTION INTRAVENOUS; SUBCUTANEOUS at 01:04

## 2017-01-01 RX ADMIN — ALBUTEROL SULFATE 2.5 MG: 2.5 SOLUTION RESPIRATORY (INHALATION) at 11:09

## 2017-01-01 RX ADMIN — INSULIN ASPART 5 UNITS: 100 INJECTION, SOLUTION INTRAVENOUS; SUBCUTANEOUS at 06:04

## 2017-01-01 RX ADMIN — ISOSORBIDE MONONITRATE 60 MG: 60 TABLET, EXTENDED RELEASE ORAL at 08:07

## 2017-01-01 RX ADMIN — DEXTROSE MONOHYDRATE 30 MMOL: 5 INJECTION, SOLUTION INTRAVENOUS at 11:12

## 2017-01-01 RX ADMIN — POLYETHYLENE GLYCOL 3350 17 G: 17 POWDER, FOR SOLUTION ORAL at 08:03

## 2017-01-01 RX ADMIN — PHENYLEPHRINE HYDROCHLORIDE 200 MCG: 10 INJECTION INTRAVENOUS at 12:12

## 2017-01-01 RX ADMIN — HYDROMORPHONE HYDROCHLORIDE 0.5 MG: 2 INJECTION INTRAMUSCULAR; INTRAVENOUS; SUBCUTANEOUS at 08:11

## 2017-01-01 RX ADMIN — HYDROCODONE BITARTRATE AND ACETAMINOPHEN 1 TABLET: 10; 325 TABLET ORAL at 04:12

## 2017-01-01 RX ADMIN — HALOPERIDOL LACTATE 5 MG: 5 INJECTION, SOLUTION INTRAMUSCULAR at 03:12

## 2017-01-01 RX ADMIN — INSULIN ASPART 5 UNITS: 100 INJECTION, SOLUTION INTRAVENOUS; SUBCUTANEOUS at 12:12

## 2017-01-01 RX ADMIN — FUROSEMIDE 80 MG: 10 INJECTION, SOLUTION INTRAMUSCULAR; INTRAVENOUS at 04:09

## 2017-01-01 RX ADMIN — HYDROCODONE BITARTRATE AND ACETAMINOPHEN 1 TABLET: 10; 325 TABLET ORAL at 11:11

## 2017-01-01 RX ADMIN — HEPARIN SODIUM 5000 UNITS: 5000 INJECTION, SOLUTION INTRAVENOUS; SUBCUTANEOUS at 05:04

## 2017-01-01 RX ADMIN — PANTOPRAZOLE SODIUM 40 MG: 40 TABLET, DELAYED RELEASE ORAL at 09:11

## 2017-01-01 RX ADMIN — LEVOTHYROXINE SODIUM 125 MCG: 25 TABLET ORAL at 06:09

## 2017-01-01 RX ADMIN — PHENYLEPHRINE HYDROCHLORIDE 100 MCG: 10 INJECTION INTRAVENOUS at 12:12

## 2017-01-01 RX ADMIN — EPHEDRINE SULFATE 10 MG: 50 INJECTION, SOLUTION INTRAMUSCULAR; INTRAVENOUS; SUBCUTANEOUS at 12:12

## 2017-01-01 RX ADMIN — SEVELAMER CARBONATE 1600 MG: 800 TABLET, FILM COATED ORAL at 04:09

## 2017-01-01 RX ADMIN — AMLODIPINE BESYLATE 10 MG: 5 TABLET ORAL at 08:07

## 2017-01-01 RX ADMIN — HYDROCODONE BITARTRATE AND ACETAMINOPHEN 1 TABLET: 5; 325 TABLET ORAL at 08:03

## 2017-01-01 RX ADMIN — INSULIN ASPART 5 UNITS: 100 INJECTION, SOLUTION INTRAVENOUS; SUBCUTANEOUS at 11:04

## 2017-01-01 RX ADMIN — CALCIUM CHLORIDE 1 G: 100 INJECTION, SOLUTION INTRAVENOUS at 01:12

## 2017-01-01 RX ADMIN — FUROSEMIDE 80 MG: 10 INJECTION, SOLUTION INTRAMUSCULAR; INTRAVENOUS at 05:09

## 2017-01-01 RX ADMIN — INSULIN ASPART 2 UNITS: 100 INJECTION, SOLUTION INTRAVENOUS; SUBCUTANEOUS at 08:12

## 2017-01-01 RX ADMIN — SEVELAMER CARBONATE 800 MG: 800 TABLET, FILM COATED ORAL at 07:04

## 2017-01-01 RX ADMIN — INSULIN HUMAN 4 UNITS: 100 INJECTION, SOLUTION PARENTERAL at 04:09

## 2017-01-01 RX ADMIN — DEXTROSE MONOHYDRATE 25 G: 25 INJECTION, SOLUTION INTRAVENOUS at 05:12

## 2017-01-01 RX ADMIN — SEVELAMER CARBONATE 1600 MG: 800 TABLET, FILM COATED ORAL at 05:11

## 2017-01-01 RX ADMIN — ATORVASTATIN CALCIUM 40 MG: 40 TABLET, FILM COATED ORAL at 09:11

## 2017-01-01 RX ADMIN — ALBUTEROL SULFATE 2.5 MG: 2.5 SOLUTION RESPIRATORY (INHALATION) at 12:09

## 2017-01-01 RX ADMIN — ALBUTEROL SULFATE 2.5 MG: 2.5 SOLUTION RESPIRATORY (INHALATION) at 07:09

## 2017-01-01 RX ADMIN — ATROPINE SULFATE 1 MG: 0.1 INJECTION, SOLUTION INTRAVENOUS at 05:12

## 2017-01-01 RX ADMIN — ASPIRIN 81 MG CHEWABLE TABLET 81 MG: 81 TABLET CHEWABLE at 08:03

## 2017-01-01 RX ADMIN — INSULIN ASPART 4 UNITS: 100 INJECTION, SOLUTION INTRAVENOUS; SUBCUTANEOUS at 06:12

## 2017-01-01 RX ADMIN — ALLOPURINOL 100 MG: 100 TABLET ORAL at 06:09

## 2017-01-01 RX ADMIN — FUROSEMIDE 80 MG: 10 INJECTION, SOLUTION INTRAMUSCULAR; INTRAVENOUS at 08:09

## 2017-01-01 RX ADMIN — HEPARIN SODIUM 5000 UNITS: 5000 INJECTION, SOLUTION INTRAVENOUS; SUBCUTANEOUS at 09:12

## 2017-01-01 RX ADMIN — HYDROCODONE BITARTRATE AND ACETAMINOPHEN 1 TABLET: 10; 325 TABLET ORAL at 07:12

## 2017-01-01 RX ADMIN — INSULIN ASPART 5 UNITS: 100 INJECTION, SOLUTION INTRAVENOUS; SUBCUTANEOUS at 11:12

## 2017-01-01 RX ADMIN — HYDROCODONE BITARTRATE AND ACETAMINOPHEN 1 TABLET: 10; 325 TABLET ORAL at 09:12

## 2017-01-01 RX ADMIN — ALBUTEROL SULFATE 2.5 MG: 2.5 SOLUTION RESPIRATORY (INHALATION) at 01:09

## 2017-01-01 RX ADMIN — HEPARIN SODIUM (PORCINE) LOCK FLUSH IV SOLN 100 UNIT/ML 500 UNITS: 100 SOLUTION at 04:12

## 2017-01-01 RX ADMIN — HEPARIN SODIUM 3000 UNITS: 1000 INJECTION, SOLUTION INTRAVENOUS; SUBCUTANEOUS at 07:11

## 2017-01-01 RX ADMIN — CARVEDILOL 25 MG: 25 TABLET, FILM COATED ORAL at 03:04

## 2017-01-01 RX ADMIN — INSULIN DETEMIR 10 UNITS: 100 INJECTION, SOLUTION SUBCUTANEOUS at 07:04

## 2017-01-01 RX ADMIN — PROPOFOL 25 MCG/KG/MIN: 10 INJECTION, EMULSION INTRAVENOUS at 12:12

## 2017-01-01 RX ADMIN — PHENYLEPHRINE HYDROCHLORIDE 100 MCG: 10 INJECTION INTRAVENOUS at 07:11

## 2017-01-01 RX ADMIN — TRAZODONE HYDROCHLORIDE 100 MG: 50 TABLET ORAL at 08:12

## 2017-01-01 RX ADMIN — PANTOPRAZOLE SODIUM 40 MG: 40 TABLET, DELAYED RELEASE ORAL at 01:07

## 2017-01-01 RX ADMIN — SODIUM CHLORIDE 500 ML: 0.9 INJECTION, SOLUTION INTRAVENOUS at 04:12

## 2017-01-01 RX ADMIN — HYDROCODONE BITARTRATE AND ACETAMINOPHEN 1 TABLET: 5; 325 TABLET ORAL at 11:09

## 2017-01-01 RX ADMIN — LABETALOL HYDROCHLORIDE 10 MG: 5 INJECTION, SOLUTION INTRAVENOUS at 01:12

## 2017-01-01 RX ADMIN — LIDOCAINE HYDROCHLORIDE 80 MG: 20 INJECTION, SOLUTION INTRAVENOUS at 12:12

## 2017-01-01 RX ADMIN — INSULIN ASPART 2 UNITS: 100 INJECTION, SOLUTION INTRAVENOUS; SUBCUTANEOUS at 07:03

## 2017-01-01 RX ADMIN — Medication 0.05 MCG/KG/MIN: at 12:12

## 2017-01-01 RX ADMIN — METOPROLOL TARTRATE 2.5 MG: 5 INJECTION, SOLUTION INTRAVENOUS at 01:12

## 2017-01-01 RX ADMIN — INSULIN ASPART 5 UNITS: 100 INJECTION, SOLUTION INTRAVENOUS; SUBCUTANEOUS at 04:12

## 2017-01-01 RX ADMIN — SEVELAMER CARBONATE 800 MG: 800 TABLET, FILM COATED ORAL at 01:07

## 2017-01-01 RX ADMIN — LEVOTHYROXINE SODIUM 125 MCG: 25 TABLET ORAL at 05:04

## 2017-01-01 RX ADMIN — DEXTROSE MONOHYDRATE 12.5 G: 25 INJECTION, SOLUTION INTRAVENOUS at 02:03

## 2017-01-01 RX ADMIN — ALBUTEROL SULFATE 2.5 MG: 2.5 SOLUTION RESPIRATORY (INHALATION) at 08:03

## 2017-01-01 RX ADMIN — INSULIN ASPART 10 UNITS: 100 INJECTION, SOLUTION INTRAVENOUS; SUBCUTANEOUS at 04:07

## 2017-01-01 RX ADMIN — LUBIPROSTONE 8 MCG: 8 CAPSULE, GELATIN COATED ORAL at 09:11

## 2017-01-01 RX ADMIN — PANTOPRAZOLE SODIUM 40 MG: 40 TABLET, DELAYED RELEASE ORAL at 08:07

## 2017-01-01 RX ADMIN — Medication 1000 MG: at 05:12

## 2017-01-01 RX ADMIN — HYDROCODONE BITARTRATE AND ACETAMINOPHEN 1 TABLET: 5; 325 TABLET ORAL at 09:09

## 2017-01-01 RX ADMIN — HYDROCODONE BITARTRATE AND ACETAMINOPHEN 1 TABLET: 10; 325 TABLET ORAL at 05:12

## 2017-01-01 RX ADMIN — LEVOTHYROXINE SODIUM 125 MCG: 25 TABLET ORAL at 05:07

## 2017-01-01 RX ADMIN — INSULIN ASPART 4 UNITS: 100 INJECTION, SOLUTION INTRAVENOUS; SUBCUTANEOUS at 05:09

## 2017-01-01 RX ADMIN — CALCIUM GLUCONATE 1000 MG: 94 INJECTION, SOLUTION INTRAVENOUS at 10:12

## 2017-01-01 RX ADMIN — HYDROCODONE BITARTRATE AND ACETAMINOPHEN 1 TABLET: 10; 325 TABLET ORAL at 12:12

## 2017-01-01 RX ADMIN — HYDROCODONE BITARTRATE AND ACETAMINOPHEN 1 TABLET: 5; 325 TABLET ORAL at 03:11

## 2017-01-01 RX ADMIN — PANTOPRAZOLE SODIUM 40 MG: 40 TABLET, DELAYED RELEASE ORAL at 08:03

## 2017-01-01 RX ADMIN — INSULIN ASPART 2 UNITS: 100 INJECTION, SOLUTION INTRAVENOUS; SUBCUTANEOUS at 09:03

## 2017-01-01 RX ADMIN — ASPIRIN 325 MG ORAL TABLET 325 MG: 325 PILL ORAL at 08:03

## 2017-01-01 RX ADMIN — HEPARIN SODIUM 5000 UNITS: 5000 INJECTION, SOLUTION INTRAVENOUS; SUBCUTANEOUS at 05:07

## 2017-01-01 RX ADMIN — CARVEDILOL 25 MG: 6.25 TABLET, FILM COATED ORAL at 08:03

## 2017-01-01 RX ADMIN — LISINOPRIL 20 MG: 20 TABLET ORAL at 03:04

## 2017-01-01 RX ADMIN — FUROSEMIDE 80 MG: 40 TABLET ORAL at 08:07

## 2017-01-01 RX ADMIN — HEPARIN SODIUM 5000 UNITS: 5000 INJECTION, SOLUTION INTRAVENOUS; SUBCUTANEOUS at 08:12

## 2017-01-01 RX ADMIN — INSULIN ASPART 5 UNITS: 100 INJECTION, SOLUTION INTRAVENOUS; SUBCUTANEOUS at 04:09

## 2017-01-01 RX ADMIN — INSULIN DETEMIR 5 UNITS: 100 INJECTION, SOLUTION SUBCUTANEOUS at 11:09

## 2017-01-01 RX ADMIN — INSULIN DETEMIR 5 UNITS: 100 INJECTION, SOLUTION SUBCUTANEOUS at 09:09

## 2017-01-01 RX ADMIN — ALLOPURINOL 100 MG: 100 TABLET ORAL at 07:11

## 2017-01-01 RX ADMIN — PREGABALIN 75 MG: 50 CAPSULE ORAL at 11:09

## 2017-01-01 RX ADMIN — PANTOPRAZOLE SODIUM 40 MG: 40 TABLET, DELAYED RELEASE ORAL at 07:04

## 2017-01-01 RX ADMIN — SEVELAMER CARBONATE 800 MG: 800 TABLET, FILM COATED ORAL at 11:07

## 2017-01-01 RX ADMIN — HYDROCODONE BITARTRATE AND ACETAMINOPHEN 1 TABLET: 10; 325 TABLET ORAL at 02:12

## 2017-01-01 RX ADMIN — ATORVASTATIN CALCIUM 40 MG: 40 TABLET, FILM COATED ORAL at 08:12

## 2017-01-01 RX ADMIN — HEPARIN SODIUM 5000 UNITS: 5000 INJECTION, SOLUTION INTRAVENOUS; SUBCUTANEOUS at 03:12

## 2017-01-01 RX ADMIN — INSULIN ASPART 5 UNITS: 100 INJECTION, SOLUTION INTRAVENOUS; SUBCUTANEOUS at 09:12

## 2017-01-01 RX ADMIN — ETOMIDATE 4 MG: 2 INJECTION, SOLUTION INTRAVENOUS at 07:11

## 2017-01-01 RX ADMIN — INSULIN DETEMIR 10 UNITS: 100 INJECTION, SOLUTION SUBCUTANEOUS at 01:07

## 2017-01-01 RX ADMIN — VASOPRESSIN 2 UNITS: 20 INJECTION INTRAVENOUS at 07:11

## 2017-01-01 RX ADMIN — NALOXONE HYDROCHLORIDE 0.4 MG: 0.4 INJECTION, SOLUTION INTRAMUSCULAR; INTRAVENOUS; SUBCUTANEOUS at 05:12

## 2017-01-01 RX ADMIN — DEXTROSE MONOHYDRATE 30 MMOL: 5 INJECTION, SOLUTION INTRAVENOUS at 06:12

## 2017-01-01 RX ADMIN — SEVELAMER CARBONATE 800 MG: 800 TABLET, FILM COATED ORAL at 04:09

## 2017-01-01 RX ADMIN — CARVEDILOL 25 MG: 6.25 TABLET, FILM COATED ORAL at 11:03

## 2017-01-01 RX ADMIN — HEPARIN SODIUM 5000 UNITS: 5000 INJECTION, SOLUTION INTRAVENOUS; SUBCUTANEOUS at 09:07

## 2017-01-01 RX ADMIN — INSULIN ASPART 4 UNITS: 100 INJECTION, SOLUTION INTRAVENOUS; SUBCUTANEOUS at 10:11

## 2017-01-01 RX ADMIN — ALBUTEROL SULFATE 2.5 MG: 2.5 SOLUTION RESPIRATORY (INHALATION) at 08:09

## 2017-01-01 RX ADMIN — LEVOTHYROXINE SODIUM 125 MCG: 100 TABLET ORAL at 08:03

## 2017-01-01 RX ADMIN — CLOPIDOGREL 300 MG: 75 TABLET, FILM COATED ORAL at 03:03

## 2017-01-01 RX ADMIN — HYDROCODONE BITARTRATE AND ACETAMINOPHEN 1 TABLET: 5; 325 TABLET ORAL at 02:12

## 2017-01-01 RX ADMIN — AMLODIPINE BESYLATE 5 MG: 5 TABLET ORAL at 03:04

## 2017-01-01 RX ADMIN — CARVEDILOL 3.12 MG: 3.12 TABLET, FILM COATED ORAL at 04:09

## 2017-01-01 RX ADMIN — CLOPIDOGREL 300 MG: 75 TABLET, FILM COATED ORAL at 09:03

## 2017-01-01 RX ADMIN — CEFAZOLIN SODIUM 2 G: 2 SOLUTION INTRAVENOUS at 12:12

## 2017-01-01 RX ADMIN — INSULIN ASPART 2 UNITS: 100 INJECTION, SOLUTION INTRAVENOUS; SUBCUTANEOUS at 09:12

## 2017-01-01 RX ADMIN — FUROSEMIDE 80 MG: 40 TABLET ORAL at 08:12

## 2017-01-01 RX ADMIN — ALBUMIN (HUMAN) 25 G: 12.5 SOLUTION INTRAVENOUS at 10:09

## 2017-01-01 RX ADMIN — HEPARIN SODIUM 5000 UNITS: 5000 INJECTION, SOLUTION INTRAVENOUS; SUBCUTANEOUS at 10:12

## 2017-01-01 RX ADMIN — ALLOPURINOL 100 MG: 100 TABLET ORAL at 07:03

## 2017-01-01 RX ADMIN — Medication 0.15 MCG/KG/MIN: at 05:12

## 2017-01-01 RX ADMIN — ALBUTEROL SULFATE 10 MG: 2.5 SOLUTION RESPIRATORY (INHALATION) at 07:09

## 2017-01-01 RX ADMIN — ALLOPURINOL 100 MG: 100 TABLET ORAL at 08:09

## 2017-01-01 RX ADMIN — ALLOPURINOL 100 MG: 100 TABLET ORAL at 07:12

## 2017-01-01 RX ADMIN — ATORVASTATIN CALCIUM 40 MG: 40 TABLET, FILM COATED ORAL at 03:04

## 2017-01-01 RX ADMIN — ASPIRIN 81 MG: 81 TABLET, COATED ORAL at 07:04

## 2017-01-01 RX ADMIN — SODIUM CHLORIDE 5 UNITS/HR: 9 INJECTION, SOLUTION INTRAVENOUS at 11:03

## 2017-01-01 RX ADMIN — LEVOTHYROXINE SODIUM 125 MCG: 25 TABLET ORAL at 07:12

## 2017-01-01 RX ADMIN — INSULIN ASPART 5 UNITS: 100 INJECTION, SOLUTION INTRAVENOUS; SUBCUTANEOUS at 11:07

## 2017-01-01 RX ADMIN — INSULIN ASPART 5 UNITS: 100 INJECTION, SOLUTION INTRAVENOUS; SUBCUTANEOUS at 09:11

## 2017-01-01 RX ADMIN — HYDROCODONE BITARTRATE AND ACETAMINOPHEN 1 TABLET: 5; 325 TABLET ORAL at 09:11

## 2017-01-01 RX ADMIN — DEXTROSE MONOHYDRATE 25 G: 25 INJECTION, SOLUTION INTRAVENOUS at 04:12

## 2017-01-01 RX ADMIN — SEVELAMER CARBONATE 1600 MG: 800 TABLET, FILM COATED ORAL at 01:09

## 2017-01-01 RX ADMIN — SEVELAMER CARBONATE 800 MG: 800 TABLET, FILM COATED ORAL at 08:07

## 2017-01-01 RX ADMIN — CALCIUM CHLORIDE 1 G: 100 INJECTION, SOLUTION INTRAVENOUS at 05:12

## 2017-01-01 RX ADMIN — SODIUM CHLORIDE, PRESERVATIVE FREE 3 ML: 5 INJECTION INTRAVENOUS at 04:03

## 2017-01-01 RX ADMIN — INSULIN ASPART 3 UNITS: 100 INJECTION, SOLUTION INTRAVENOUS; SUBCUTANEOUS at 09:09

## 2017-01-01 RX ADMIN — SODIUM CHLORIDE: 0.9 INJECTION, SOLUTION INTRAVENOUS at 02:12

## 2017-01-01 RX ADMIN — ALBUTEROL SULFATE 2.5 MG: 2.5 SOLUTION RESPIRATORY (INHALATION) at 02:09

## 2017-01-01 RX ADMIN — DOCUSATE SODIUM AND SENNOSIDES 1 TABLET: 8.6; 5 TABLET, FILM COATED ORAL at 11:03

## 2017-01-01 RX ADMIN — CARVEDILOL 25 MG: 6.25 TABLET, FILM COATED ORAL at 06:03

## 2017-01-01 RX ADMIN — ATORVASTATIN CALCIUM 60 MG: 40 TABLET, FILM COATED ORAL at 08:03

## 2017-01-01 RX ADMIN — ASPIRIN 81 MG 81 MG: 81 TABLET ORAL at 01:07

## 2017-01-01 RX ADMIN — SEVELAMER CARBONATE 1600 MG: 800 TABLET, FILM COATED ORAL at 08:09

## 2017-01-01 RX ADMIN — SODIUM CHLORIDE: 0.9 INJECTION, SOLUTION INTRAVENOUS at 06:11

## 2017-01-01 RX ADMIN — SODIUM CHLORIDE, PRESERVATIVE FREE 3 ML: 5 INJECTION INTRAVENOUS at 11:03

## 2017-01-01 RX ADMIN — ESMOLOL HYDROCHLORIDE 20 MG: 10 INJECTION, SOLUTION INTRAVENOUS at 01:12

## 2017-01-01 RX ADMIN — SEVELAMER CARBONATE 1600 MG: 800 TABLET, FILM COATED ORAL at 05:09

## 2017-01-01 RX ADMIN — INSULIN ASPART 5 UNITS: 100 INJECTION, SOLUTION INTRAVENOUS; SUBCUTANEOUS at 01:07

## 2017-01-01 RX ADMIN — MIDODRINE HYDROCHLORIDE 5 MG: 5 TABLET ORAL at 04:12

## 2017-01-01 RX ADMIN — Medication 0.05 MCG/KG/MIN: at 04:12

## 2017-01-01 RX ADMIN — MIDAZOLAM 1 MG: 1 INJECTION INTRAMUSCULAR; INTRAVENOUS at 12:12

## 2017-01-01 RX ADMIN — SEVELAMER CARBONATE 800 MG: 800 TABLET, FILM COATED ORAL at 06:04

## 2017-01-01 RX ADMIN — INSULIN ASPART 8 UNITS: 100 INJECTION, SOLUTION INTRAVENOUS; SUBCUTANEOUS at 11:03

## 2017-01-01 RX ADMIN — SEVELAMER CARBONATE 1600 MG: 800 TABLET, FILM COATED ORAL at 09:11

## 2017-01-01 RX ADMIN — INSULIN ASPART 2 UNITS: 100 INJECTION, SOLUTION INTRAVENOUS; SUBCUTANEOUS at 12:03

## 2017-01-01 RX ADMIN — INSULIN ASPART 5 UNITS: 100 INJECTION, SOLUTION INTRAVENOUS; SUBCUTANEOUS at 05:12

## 2017-01-01 RX ADMIN — PREGABALIN 75 MG: 75 CAPSULE ORAL at 08:12

## 2017-01-01 RX ADMIN — INSULIN ASPART 10 UNITS: 100 INJECTION, SOLUTION INTRAVENOUS; SUBCUTANEOUS at 06:09

## 2017-01-01 RX ADMIN — INSULIN ASPART 4 UNITS: 100 INJECTION, SOLUTION INTRAVENOUS; SUBCUTANEOUS at 08:03

## 2017-01-01 RX ADMIN — SODIUM CHLORIDE: 0.9 INJECTION, SOLUTION INTRAVENOUS at 10:12

## 2017-01-01 RX ADMIN — Medication: at 08:12

## 2017-01-01 RX ADMIN — MIDODRINE HYDROCHLORIDE 5 MG: 5 TABLET ORAL at 03:12

## 2017-01-01 RX ADMIN — INSULIN DETEMIR 14 UNITS: 100 INJECTION, SOLUTION SUBCUTANEOUS at 09:12

## 2017-01-01 RX ADMIN — CLOPIDOGREL BISULFATE 75 MG: 75 TABLET ORAL at 07:04

## 2017-01-01 RX ADMIN — HYDROXYZINE HYDROCHLORIDE 25 MG: 25 TABLET, FILM COATED ORAL at 11:09

## 2017-01-01 RX ADMIN — LUBIPROSTONE 8 MCG: 8 CAPSULE, GELATIN COATED ORAL at 08:12

## 2017-01-01 RX ADMIN — ISOSORBIDE MONONITRATE 60 MG: 60 TABLET, EXTENDED RELEASE ORAL at 01:07

## 2017-01-01 RX ADMIN — INSULIN ASPART 4 UNITS: 100 INJECTION, SOLUTION INTRAVENOUS; SUBCUTANEOUS at 04:07

## 2017-01-01 RX ADMIN — SODIUM CHLORIDE, PRESERVATIVE FREE 3 ML: 5 INJECTION INTRAVENOUS at 09:03

## 2017-01-01 RX ADMIN — INSULIN DETEMIR 7 UNITS: 100 INJECTION, SOLUTION SUBCUTANEOUS at 09:03

## 2017-01-01 RX ADMIN — INSULIN ASPART 5 UNITS: 100 INJECTION, SOLUTION INTRAVENOUS; SUBCUTANEOUS at 08:07

## 2017-01-01 RX ADMIN — CLOPIDOGREL BISULFATE 75 MG: 75 TABLET ORAL at 01:07

## 2017-01-01 RX ADMIN — SEVELAMER CARBONATE 800 MG: 800 TABLET, FILM COATED ORAL at 07:03

## 2017-01-01 RX ADMIN — INSULIN HUMAN 10 UNITS: 100 INJECTION, SOLUTION PARENTERAL at 08:07

## 2017-01-01 RX ADMIN — INSULIN ASPART 3 UNITS: 100 INJECTION, SOLUTION INTRAVENOUS; SUBCUTANEOUS at 10:09

## 2017-01-01 RX ADMIN — LUBIPROSTONE 8 MCG: 8 CAPSULE, GELATIN COATED ORAL at 08:07

## 2017-01-01 RX ADMIN — MAGNESIUM SULFATE IN WATER 4 G: 40 INJECTION, SOLUTION INTRAVENOUS at 08:12

## 2017-03-07 PROBLEM — I21.4 NSTEMI (NON-ST ELEVATED MYOCARDIAL INFARCTION): Status: ACTIVE | Noted: 2017-01-01

## 2017-03-07 PROBLEM — Z91.199 PERSONAL HISTORY OF NONCOMPLIANCE WITH MEDICAL TREATMENT, PRESENTING HAZARDS TO HEALTH: Status: ACTIVE | Noted: 2017-01-01

## 2017-03-07 PROBLEM — I15.0 RENOVASCULAR HYPERTENSION: Status: ACTIVE | Noted: 2017-01-01

## 2017-03-07 NOTE — IP AVS SNAPSHOT
[place] [address] [place] [address]  Phone: [phone]           Patient Discharge Instructions     Our goal is to set you up for success. This packet includes information on your condition, medications, and your home care. It will help you to care for yourself so you don't get sicker and need to go back to the hospital.     Please ask your nurse if you have any questions.        There are many details to remember when preparing to leave the hospital. Here is what you will need to do:    1. Take your medicine. If you are prescribed medications, review your Medication List in the following pages. You may have new medications to  at the pharmacy and others that you'll need to stop taking. Review the instructions for how and when to take your medications. Talk with your doctor or nurses if you are unsure of what to do.     2. Go to your follow-up appointments. Specific follow-up information is listed in the following pages. Your may be contacted by a transition nurse or clinical provider about future appointments. Be sure we have all of the phone numbers to reach you, if needed. Please contact your provider's office if you are unable to make an appointment.     3. Watch for warning signs. Your doctor or nurse will give you detailed warning signs to watch for and when to call for assistance. These instructions may also include educational information about your condition. If you experience any of warning signs to your health, call your doctor.               ** Verify the list of medication(s) below is accurate and up to date. Carry this with you in case of emergency. If your medications have changed, please notify your healthcare provider.             Medication List      START taking these medications        Additional Info                      amlodipine 10 MG tablet   Commonly known as:  NORVASC   Quantity:  30 tablet   Refills:  0   Dose:  10 mg    Last time this was  given:  10 mg on 3/9/2017  8:24 AM   Instructions:  Take 1 tablet (10 mg total) by mouth once daily.     Begin Date    AM    Noon    PM    Bedtime       clopidogrel 75 mg tablet   Commonly known as:  PLAVIX   Quantity:  30 tablet   Refills:  0   Dose:  75 mg    Last time this was given:  75 mg on 3/9/2017  8:25 AM   Instructions:  Take 1 tablet (75 mg total) by mouth once daily.     Begin Date    AM    Noon    PM    Bedtime       lisinopril 20 MG tablet   Commonly known as:  PRINIVIL,ZESTRIL   Quantity:  30 tablet   Refills:  0   Dose:  20 mg    Last time this was given:  20 mg on 3/9/2017  8:24 AM   Instructions:  Take 1 tablet (20 mg total) by mouth once daily.     Begin Date    AM    Noon    PM    Bedtime         CONTINUE taking these medications        Additional Info                      albuterol sulfate 2.5 mg/0.5 mL Nebu   Quantity:  225 mg   Refills:  11   Dose:  2.5 mg    Last time this was given:  2.5 mg on 3/9/2017  8:53 AM   Instructions:  Take 2.5 mg by nebulization every 8 (eight) hours.     Begin Date    AM    Noon    PM    Bedtime       allopurinol 100 MG tablet   Commonly known as:  ZYLOPRIM   Refills:  0   Dose:  100 mg    Last time this was given:  100 mg on 3/9/2017  8:33 AM   Instructions:  Take 100 mg by mouth every morning.     Begin Date    AM    Noon    PM    Bedtime       aspirin 81 MG EC tablet   Commonly known as:  ECOTRIN   Refills:  0   Dose:  81 mg    Instructions:  Take 1 tablet (81 mg total) by mouth once daily.     Begin Date    AM    Noon    PM    Bedtime       atorvastatin 40 MG tablet   Commonly known as:  LIPITOR   Quantity:  30 tablet   Refills:  11    Last time this was given:  60 mg on 3/9/2017  8:24 AM   Instructions:  Take 1 tablet (40 mg total) by mouth once daily.     Begin Date    AM    Noon    PM    Bedtime       carvedilol 25 MG tablet   Commonly known as:  COREG   Quantity:  60 tablet   Refills:  0   Dose:  25 mg    Last time this was given:  25 mg on 3/9/2017  8:24  AM   Instructions:  Take 1 tablet (25 mg total) by mouth 2 (two) times daily with meals.     Begin Date    AM    Noon    PM    Bedtime       collagenase ointment   Quantity:  30 g   Refills:  0    Instructions:  Apply topically once daily.     Begin Date    AM    Noon    PM    Bedtime       CONTOUR TEST STRIPS Strp   Refills:  0   Generic drug:  blood sugar diagnostic    Instructions:  USE TO CHECK BLOOD SUGAR THREE TIMES A DAY.     Begin Date    AM    Noon    PM    Bedtime       ENSURE HIGH PROTEIN Powd   Refills:  0   Generic drug:  protein    Instructions:  Take by mouth 3 (three) times daily. 3 teaspoons PO TID     Begin Date    AM    Noon    PM    Bedtime       furosemide 80 MG tablet   Commonly known as:  LASIX   Quantity:  90 tablet   Refills:  3   Dose:  80 mg   Indications:  TEMPORARY DUE TO SWELLING (BLE)   Comments:  Hold diuretics and if weight gain up to 4 lbs then restart (per nephrology).  Weigh yourself daily.    Instructions:  Take 1 tablet (80 mg total) by mouth once daily.     Begin Date    AM    Noon    PM    Bedtime       glimepiride 2 MG tablet   Commonly known as:  AMARYL   Refills:  0   Dose:  2 mg    Instructions:  Take 2 mg by mouth daily with breakfast.     Begin Date    AM    Noon    PM    Bedtime       hydrocodone-acetaminophen 5-325mg 5-325 mg per tablet   Commonly known as:  NORCO   Quantity:  40 tablet   Refills:  0   Dose:  1 tablet    Last time this was given:  1 tablet on 3/9/2017  8:33 AM   Instructions:  Take 1 tablet by mouth every 6 (six) hours as needed for Pain.     Begin Date    AM    Noon    PM    Bedtime       insulin detemir 100 unit/mL (3 mL) Inpn pen   Commonly known as:  LEVEMIR FLEXTOUCH   Refills:  0   Dose:  5 Units    Last time this was given:  7 Units on 3/9/2017  8:25 AM   Instructions:  Inject 5 Units into the skin.     Begin Date    AM    Noon    PM    Bedtime       isosorbide mononitrate 60 MG 24 hr tablet   Commonly known as:  IMDUR   Quantity:  30 tablet    Refills:  3   Dose:  60 mg    Instructions:  Take 1 tablet (60 mg total) by mouth once daily.     Begin Date    AM    Noon    PM    Bedtime       levothyroxine 125 MCG tablet   Commonly known as:  SYNTHROID   Refills:  0   Dose:  125 mcg    Last time this was given:  125 mcg on 3/9/2017  8:24 AM   Instructions:  Take 125 mcg by mouth once daily.     Begin Date    AM    Noon    PM    Bedtime       lubiprostone 8 MCG Cap   Commonly known as:  AMITIZA   Refills:  0   Dose:  8 mcg    Instructions:  Take 8 mcg by mouth 2 (two) times daily.     Begin Date    AM    Noon    PM    Bedtime       miconazole 2 % cream   Commonly known as:  MICOTIN   Quantity:  92 g   Refills:  0    Instructions:  Apply topically 2 (two) times daily.     Begin Date    AM    Noon    PM    Bedtime       pantoprazole 40 MG tablet   Commonly known as:  PROTONIX   Quantity:  30 tablet   Refills:  11    Last time this was given:  40 mg on 3/9/2017  8:24 AM   Instructions:  Take 1 tablet by mouth daily.     Begin Date    AM    Noon    PM    Bedtime       sevelamer carbonate 800 mg Tab   Commonly known as:  RENVELA   Quantity:  90 tablet   Refills:  0   Dose:  800 mg    Last time this was given:  800 mg on 3/9/2017  8:24 AM   Instructions:  Take 1 tablet (800 mg total) by mouth 3 (three) times daily with meals.     Begin Date    AM    Noon    PM    Bedtime       silver sulfADIAZINE 1% 1 % cream   Commonly known as:  SILVADENE   Quantity:  1 Bottle   Refills:  11   Dose:  1 application    Instructions:  Apply 1 application topically 2 (two) times daily.     Begin Date    AM    Noon    PM    Bedtime       trazodone 100 MG tablet   Commonly known as:  DESYREL   Quantity:  30 tablet   Refills:  11    Instructions:  Take 1 tablet (100 mg total) by mouth every evening.     Begin Date    AM    Noon    PM    Bedtime            Where to Get Your Medications      These medications were sent to [facility] - [place], [place] - [address]  [address]  [place] [address]     Phone:  [phone]     amlodipine 10 MG tablet    clopidogrel 75 mg tablet    lisinopril 20 MG tablet         Information about where to get these medications is not yet available     ! Ask your nurse or doctor about these medications     aspirin 81 MG EC tablet                  Please bring to all follow up appointments:    1. A copy of your discharge instructions.  2. All medicines you are currently taking in their original bottles.  3. Identification and insurance card.    Please arrive 15 minutes ahead of scheduled appointment time.    Please call 24 hours in advance if you must reschedule your appointment and/or time.        Your Scheduled Appointments     Mar 14, 2017  1:40 PM CDT   Established Patient Visit with ZEESHAN Ramos - Cardiology ([place])    [address], [address]  [place] [address] [phone]              Follow-up Information     Follow up with Ronan Caldwell MD On 3/28/2017.    Specialty:  Family Medicine    Why:  9:30 am arrive 15 minutes early, bring ALL your medications, your written discharge information, your insuranace card and picture ID    Contact information:    [facility] [facility]  [address]  [place] [id number] [phone]          Follow up with Nasim Torres MD On 3/14/2017.    Specialty:  Cardiology    Why:  1:40  pm Tuesday you will see Minesh Hayes for Dr Torres--arrive 15 minutes early, bring all your medications, your discharge instructions,  your insurance cards and picture ID    Contact information:    [address]  [facility] [address] [phone]          Follow up with [facility] On 3/10/2017.    Why:  return to your regular schedule    Contact information:    [phone]        Discharge Instructions     Future Orders    Activity as tolerated     Diet general     Questions:    Total calories:      Fat restriction, if any:      Protein restriction, if any:    "   Na restriction, if any:      Fluid restriction:      Additional restrictions:          Primary Diagnosis     Your primary diagnosis was:  Heart Attack      Admission Information     Date & Time Provider Department CSN    3/7/2017  9:40 PM Manuel Greenwood MD [facility] [id number]      Care Providers     Provider Role Specialty Primary office phone    Manuel Greenwood MD Attending Provider Hospitalist [phone]    Rishi De La Rosa MD Consulting Physician  Cardiology [phone]    Maryam Hong MD Consulting Physician  Nephrology [phone]      Your Vitals Were     BP Pulse Temp Resp Height Weight    128/50 58 98.1 °F (36.7 °C) (Oral) 16 5' 11" (1.803 m) 75.8 kg (167 lb 1.7 oz)    SpO2 BMI             98% 23.31 kg/m2         Recent Lab Values        6/9/2014 10/27/2014 11/15/2014 8/18/2015 3/11/2016 3/14/2016 3/7/2017 3/8/2017      7:18 AM 11:13 AM  2:30 AM  3:11 AM  6:09 AM  1:20 PM 11:43 PM  8:29 AM    A1C 6.6 (H) 7.8 (H) 7.1 (H) 6.9 (H) 10.2 (H) 10.6 (H) 14.5 (H) 14.7 (H)    Comment for A1C at 11:43 PM on 3/7/2017:  According to ADA guidelines, hemoglobin A1C <7.0% represents  optimal control in non-pregnant diabetic patients.  Different  metrics may apply to specific populations.   Standards of Medical Care in Diabetes - 2016.  For the purpose of screening for the presence of diabetes:  <5.7%     Consistent with the absence of diabetes  5.7-6.4%  Consistent with increasing risk for diabetes   (prediabetes)  >or=6.5%  Consistent with diabetes  Currently no consensus exists for use of hemoglobin A1C  for diagnosis of diabetes for children.      Comment for A1C at  8:29 AM on 3/8/2017:  According to ADA guidelines, hemoglobin A1C <7.0% represents  optimal control in non-pregnant diabetic patients.  Different  metrics may apply to specific populations.   Standards of Medical Care in Diabetes - 2016.  For the purpose of screening for the presence of diabetes:  <5.7%     " Consistent with the absence of diabetes  5.7-6.4%  Consistent with increasing risk for diabetes   (prediabetes)  >or=6.5%  Consistent with diabetes  Currently no consensus exists for use of hemoglobin A1C  for diagnosis of diabetes for children.        Pending Labs     Order Current Status    Hepatitis B Surface Antibody, Qual/Quant In process    Hepatitis B surface antigen In process      Allergies as of 3/9/2017     No Known Allergies      [facility]     [facility] Nurse Care Line - 24/7 Assistance  Unless otherwise directed by your provider, please contact [facility], our nurse care line that is available for 24/7 assistance.     Registered nurses in the [facility] provide clinical advisement, health education, appointment booking, and other advisory services.  Call for this free service at [phone].        Advance Directives     An advance directive is a document which, in the event you are no longer able to make decisions for yourself, tells your healthcare team what kind of treatment you do or do not want to receive, or who you would like to make those decisions for you.  If you do not currently have an advance directive, [facility] encourages you to create one.  For more information call:  [phone]), [phone] ([phone]),  or log on to [email or web address].        Smoking Cessation     If you would like to quit smoking:   You may be eligible for free services if you are a [place] resident and started smoking cigarettes before September 1, 1988.  Call the Smoking Cessation Trust (SCT) toll free at (313) 746-1441 or (489) 089-4179.   Call 0-735-QUIT-NOW if you do not meet the above criteria.            Language Assistance Services     ATTENTION: Language assistance services are available, free of charge. Please call [phone].      ATENCIÓN: Si habla español, tiene a bowles disposición servicios gratuitos de asistencia lingüística. Llame al  [phone].     [facility] Ý: N?u b?n nói Ti?ng Vi?t, có các d?ch v? h? tr? ngôn ng? mi?n phí dành cho b?n. G?i s? [phone].        Chronic Kindey Disease Education             Diabetes Discharge Instructions                                   [facility] Sign-Up     Activating your [facility] account is as easy as 1-2-3!     1) Visit [email or web address], select Sign Up Now, enter this activation code and your date of birth, then select Next.  [email or web address]  Expires: 4/23/2017  8:50 AM      2) Create a username and password to use when you visit [facility] in the future and select a security question in case you lose your password and select Next.    3) Enter your e-mail address and click Sign Up!    Additional Information  If you have questions, please e-mail [email or web address] or call [phone] to talk to our [facility] staff. Remember, [facility] is NOT to be used for urgent needs. For medical emergencies, dial 911.          [facility] complies with applicable Federal civil rights laws and does not discriminate on the basis of race, color, national origin, age, disability, or sex.

## 2017-03-08 NOTE — CONSULTS
Chart reviewed and case discussed with nurse.  Pt transferred from [facility] for MI.  Has missed last 2 HD sessions  Will need cardiac evaluation  Will order HD today as pt reported to be volume overloaded.  If Cardiology feels cath is needed prior to HD, can do HD after.  HD to be ordered for this am.

## 2017-03-08 NOTE — ASSESSMENT & PLAN NOTE
· evidence of coronary ischemia at this time as above  · Maintain adequate blood pressure control  · Heart healthy diet once nothing by mouth status is lifted  · Aspirin  · Statin

## 2017-03-08 NOTE — PLAN OF CARE
Problem: Patient Care Overview  Goal: Plan of Care Review  Outcome: Ongoing (interventions implemented as appropriate)  New admit for NSTEMI and volume overload.  AAOx4.  VSS.  Pt received dose of aspirin 325mg, lovenox 80mg, and plavix 300mg.  Cardiology consulted.  Denies chest pain/pressure.  Denies SOB.  O2 weaned to 2L nasal cannula.  Nephrology consulted.  2 small abrasions to left shin from recent fall at home; no further skin breakdown observed.  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  Safety precautions maintained.

## 2017-03-08 NOTE — PLAN OF CARE
03/08/17 1612   Discharge Assessment   Assessment Type Discharge Planning Assessment   Confirmed/corrected address and phone number on facesheet? No   Assessment information obtained from? Medical Record   Patient/Family In Agreement With Plan unable to assess   patient admitted from [facility]. Patient at cath lab when SW attempted assessment. SW will update assessment when patient/family available. SW will follow in ICU and assist as needed.

## 2017-03-08 NOTE — ASSESSMENT & PLAN NOTE
· Counseling given  · Noncompliance is the most prominent feature underlying the etiology of his presentation which includes volume overload, hypertension, and non-ST elevation MI

## 2017-03-08 NOTE — ASSESSMENT & PLAN NOTE
Initially presented with a blood glucose level in 800s at the outside facility.  Our point-of-care testing here showed blood in his level of 435 after insulin was given.  Hemoglobin A1c from March 14, 2016 was 10.6, demonstrating uncontrolled diabetes.  Again, highly suspicious for medication and diet noncompliance.  · BG is not in acceptable range at this time  · Maintain w/ subcutaneous insulin management order set  · Hold oral diabetic meds  · ADA 1800 kcal diet  · BG goal while in patient is <180mg/dL  · HgA1c = Pending

## 2017-03-08 NOTE — ASSESSMENT & PLAN NOTE
·  acute issues with significant volume overload,   · No electrolyte abnormality, or acid-base abnormality at this time  · Will need urgent dialysis while inpatient  · Nephrology consulted

## 2017-03-08 NOTE — ASSESSMENT & PLAN NOTE
· No evidence of acute stroke at this time  · Maintain adequate blood pressure control  · Heart healthy diet  · Aspirin  · Statin

## 2017-03-08 NOTE — PROGRESS NOTES
[facility] Hospital Medicine  Progress Note    Patient Name: Williams Bland  MRN: [id number]  Patient Class: IP- Inpatient   Admission Date: 3/7/2017  Length of Stay: 1 days  Attending Physician: Manuel Greenwood MD  Primary Care Provider: Ronan Caldwell MD        Subjective:     Principal Problem:NSTEMI (non-ST elevated myocardial infarction)    HPI:  Williams Bland is a 67 y.o. male that (in part)  has a past medical history of CHF (congestive heart failure); Coronary artery disease; CVA (cerebral vascular accident) (2013); Diabetes mellitus; ESRD (end stage renal disease); Hypertension; and Stroke. Presents to [facility] from [facility] where he presented with shortness of breath. He reportedly missed 2 sessions of hemodialysis due to complaints of pain. He was trying to get in to see his pain management physician.       Description of symptoms  Location: Lungs  Onset: Subacute onset  Character: Shortness of breath, dyspnea with exertion  Frequency: These of multiple recurrent episodes after missing dialysis previously  Duration: Constant  Associated Symptoms: Denies chest pain, palpitations, or syncope. No fever or chills.  Radiation: No radiation  Exacerbating factors: Exertion  Relieving factors: Supplemental oxygen provided in the ED.     In the emergency department prior to transfer routine laboratory studies, EKG, and cardiac enzymes were obtained. There is evidence of non-ST elevation MI and volume overload from noncompliance with hemodialysis.     Hospital medicine has been asked to admit for further evaluation and treatment.       Hospital Course:  Williams Bland is a 67 y.o. male that (in part)  has a past medical history of CHF (congestive heart failure); Coronary artery disease; CVA (cerebral vascular accident) (2013); Diabetes mellitus; ESRD (end stage renal disease); Hypertension; and Stroke. Presents to [facility] from [facility] where he presented with shortness of breath. He reportedly missed 2 sessions of hemodialysis due to complaints of pain. He was trying to get in to see his pain management physician.       Description of symptoms  Location: Lungs  Onset: Subacute onset  Character: Shortness of breath, dyspnea with exertion  Frequency: These of multiple recurrent episodes after missing dialysis previously  Duration: Constant  Associated Symptoms: Denies chest pain, palpitations, or syncope. No fever or chills.  Radiation: No radiation  Exacerbating factors: Exertion  Relieving factors: Supplemental oxygen provided in the ED.     In the emergency department prior to transfer routine laboratory studies, EKG, and cardiac enzymes were obtained. There is evidence of non-ST elevation MI and volume overload from noncompliance with hemodialysis.  Patient denies chest pain and SOB at this time,will have LHC and possible HD today.           Interval History:Patient denies chest pain and  SOB at this time.    Review of Systems   Constitutional: Negative for activity change and appetite change.   HENT: Negative for congestion and dental problem.    Eyes: Negative for discharge and itching.   Respiratory: Negative for apnea, chest tightness and shortness of breath.    Cardiovascular: Negative for chest pain and leg swelling.   Gastrointestinal: Negative for abdominal distention and abdominal pain.   Endocrine: Negative for cold intolerance and heat intolerance.   Genitourinary: Negative for difficulty urinating.   Musculoskeletal: Negative for arthralgias and back pain.   Allergic/Immunologic: Negative for environmental allergies and food allergies.   Neurological: Negative for dizziness.   Hematological: Negative for adenopathy.   Psychiatric/Behavioral: Negative for agitation and behavioral problems.     Objective:     Vital Signs (Most Recent):  Temp: 98.1 °F (36.7 °C) (03/08/17  0730)  Pulse: 64 (03/08/17 0800)  Resp: (!) 27 (03/08/17 0800)  BP: (!) 151/64 (03/08/17 0800)  SpO2: 96 % (03/08/17 0800) Vital Signs (24h Range):  Temp:  [98.1 °F (36.7 °C)-99.9 °F (37.7 °C)] 98.1 °F (36.7 °C)  Pulse:  [54-70] 64  Resp:  [12-37] 27  SpO2:  [95 %-100 %] 96 %  BP: (112-172)/(49-96) 151/64     Weight: 77.1 kg (169 lb 15.6 oz)  Body mass index is 23.71 kg/(m^2).    Intake/Output Summary (Last 24 hours) at 03/08/17 0810  Last data filed at 03/08/17 0600   Gross per 24 hour   Intake             5.83 ml   Output                0 ml   Net             5.83 ml      Physical Exam   Constitutional: He is oriented to person, place, and time. No distress.   HENT:   Head: Normocephalic and atraumatic.   Eyes: EOM are normal. Pupils are equal, round, and reactive to light.   Neck: Normal range of motion. Neck supple.   Cardiovascular: Normal rate.    Pulmonary/Chest: Effort normal.   Abdominal: Soft. Bowel sounds are normal.   Musculoskeletal: He exhibits no edema.   Neurological: He is oriented to person, place, and time. No cranial nerve deficit. Coordination normal.   Skin: Skin is warm and dry. He is not diaphoretic.   Psychiatric: He has a normal mood and affect. His behavior is normal.       Significant Labs:   BMP:   Recent Labs  Lab 03/07/17 2209   *   *   K 4.8   CL 99   CO2 21*   BUN 82*   CREATININE 6.0*   CALCIUM 7.9*   MG 1.9     CBC:   Recent Labs  Lab 03/07/17 2209   WBC 9.27   HGB 10.8*   HCT 32.9*   PLT 96*     Troponin:   Recent Labs  Lab 03/07/17 2209 03/08/17  0150   TROPONINI 1.210* 3.552*           Assessment/Plan:      * NSTEMI (non-ST elevated myocardial infarction)  · Intitial EKG findings shows no definite ST elevation MI, but there are concerns with ST segment changes in V1, V2, V3 - consulting cardiologist STAT  · Initial troponin is within normal limits = 0.06  - repeat ordered and came back elevated at 1.210  · high risk for MI;  hypertension, hyperlipidemia, coronary  artery disease, noncompliance, male gender, age, aspirin use   · Initiate ACS   · Trend cardiac enzymes  · Aspirin  · Beta-blocker  · Statin  · Supplemental oxygen  · nitroglycerine and morphine PRN  · 2D echocardiogram  · TSH, FT3, FT4  · ESR and cardiac specific CRP   · PT, PTT, INR   · Tight glycemic control  · Monitor on telemetry unit  · Consult cardiologist,[facility] today.    CAD (coronary artery disease)  · evidence of coronary ischemia at this time as above  · Maintain adequate blood pressure control  · Heart healthy diet once nothing by mouth status is lifted  · Aspirin  · Statin    Cerebrovascular disease  · No evidence of acute stroke at this time  · Maintain adequate blood pressure control  · Heart healthy diet  · Aspirin  · Statin          DM (diabetes mellitus) type II controlled with renal manifestation  Initially presented with a blood glucose level in 800s at the outside facility.  Our point-of-care testing here showed blood in his level of 435 after insulin was given.  Hemoglobin A1c from March 14, 2016 was 10.6, demonstrating uncontrolled diabetes.  Again, highly suspicious for medication and diet noncompliance.  · BG is not in acceptable range at this time  · Maintain w/ subcutaneous insulin management order set  · Hold oral diabetic meds  · ADA 1800 kcal diet  · BG goal while in patient is <180mg/dL  · HgA1c = Pending      Hyperlipidemia LDL goal <70    · Cardiac diet  · Continue statin        Thyroid disease  Thyroid dysfunction  · Clinically, patient is euthyroid   · Chemically, undetermined  ·   · Continue current regimen      Mitral valve replaced    · Supportive care    Volume overload  · Secondary to noncompliance with dialysis  · Patient missed 2 dialysis sessions because he states he needed to be seen by pain management,nephrlogy planing for HD,likely after [facility].      ESRD on hemodialysis  ·  acute issues with significant volume overload,   · No electrolyte abnormality, or acid-base  abnormality at this time  · Will need urgent dialysis while inpatient  · Nephrology consulted            Renovascular hypertension  · Goal while inpatient is a systolic blood pressure less than 140mmHg  · BP is not in acceptable range at this time; low threshold to initiate nitroglycerin drip until hemodialysis is initiated given his elevated troponin level as well  · Continue current home regimen with hold parameters  · PRN antihypertensives available        Personal history of noncompliance with medical treatment, presenting hazards to health  · Counseling given  · Noncompliance is the most prominent feature underlying the etiology of his presentation which includes volume overload, hypertension, and non-ST elevation MI      VTE Risk Mitigation         Ordered     Medium Risk of VTE  Once      03/07/17 2231     Reason for No Pharmacological VTE Prophylaxis  Once      03/07/17 2231          Manuel Greenwood MD  Department of Hospital Medicine   [facility]

## 2017-03-08 NOTE — ASSESSMENT & PLAN NOTE
· Intitial EKG findings shows no definite ST elevation MI, but there are concerns with ST segment changes in V1, V2, V3 - consulting cardiologist STAT  · Initial troponin is within normal limits = 0.06  - repeat ordered and came back elevated at 1.210  · high risk for MI;  hypertension, hyperlipidemia, coronary artery disease, noncompliance, male gender, age, aspirin use   · Initiate ACS   · Trend cardiac enzymes  · Aspirin  · Beta-blocker  · Statin  · Supplemental oxygen  · nitroglycerine and morphine PRN  · 2D echocardiogram  · TSH, FT3, FT4  · ESR and cardiac specific CRP   · PT, PTT, INR   · Tight glycemic control  · Monitor on telemetry unit  · Consult cardiologist

## 2017-03-08 NOTE — ASSESSMENT & PLAN NOTE
· Intitial EKG findings shows no definite ST elevation MI, but there are concerns with ST segment changes in V1, V2, V3 - consulting cardiologist STAT  · Initial troponin is within normal limits = 0.06  - repeat ordered and came back elevated at 1.210  · high risk for MI;  hypertension, hyperlipidemia, coronary artery disease, noncompliance, male gender, age, aspirin use   · Initiate ACS   · Trend cardiac enzymes  · Aspirin  · Beta-blocker  · Statin  · Supplemental oxygen  · nitroglycerine and morphine PRN  · 2D echocardiogram  · TSH, FT3, FT4  · ESR and cardiac specific CRP   · PT, PTT, INR   · Tight glycemic control  · Monitor on telemetry unit  · Consult cardiologist,[facility] today.

## 2017-03-08 NOTE — H&P
[facility] Medicine  History & Physical    Patient Name: Williams Bland  MRN: [id number]  Admission Date: 03/08/2017  Attending Physician: Ronan Guthire MD, MPH      PCP:     Ronan Caldwell MD    CC:     Transfer for non-ST elevation MI and ESRD with volume overload.    HISTORY OF PRESENT ILLNESS:     Williams Bland is a 67 y.o. male that (in part)  has a past medical history of CHF (congestive heart failure); Coronary artery disease; CVA (cerebral vascular accident) (2013); Diabetes mellitus; ESRD (end stage renal disease); Hypertension; and Stroke. Presents to [facility] from [facility] Emergency Department where he presented with shortness of breath.  He reportedly missed 2 sessions of hemodialysis due to complaints of pain.  He was trying to get in to see his pain management physician.      Description of symptoms  Location:  Lungs  Onset:  Subacute onset  Character:  Shortness of breath, dyspnea with exertion  Frequency:  These of multiple recurrent episodes after missing dialysis previously  Duration:  Constant  Associated Symptoms:  Denies chest pain, palpitations, or syncope.  No fever or chills.  Radiation:  No radiation  Exacerbating factors:  Exertion  Relieving factors:  Supplemental oxygen provided in the ED.    In the emergency department prior to transfer routine laboratory studies, EKG, and cardiac enzymes were obtained.  There is evidence of non-ST elevation MI and volume overload from noncompliance with hemodialysis.    Hospital medicine has been asked to admit for further evaluation and treatment.       REVIEW OF SYSTEMS:     -- Constitutional: Generalized weakness and fatigue.  No fever or chills.  -- Eyes: No visual changes, diplopia, pain, tearing, blind spots, or discharge.   -- Ears, nose, mouth, throat, and face: No congestion, sore throat, epistaxis, d/c, bleeding gums, neck stiffness masses, or dental issues.  -- Respiratory:  As above in history of present illness.  -- Cardiovascular: As above in the history of present illness.   -- Gastrointestinal: No vomiting, abdominal pain, dysphagia, hematemesis, melena, dyspepsia, or change in bowel habits.  -- Genitourinary: ESRD.   -- Integument/breast: No rash, pruritis, pigmentation changes, dryness, or changes in hair  -- Hematologic/lymphatic: Positive for easy bruising, but negative for lymphadenopathy.   -- Musculoskeletal: Diffuse muscle weakness as above in history of present illness.  This is acute on chronic.  Uses a Rollator to ambulate   -- Neurological: Chronic ataxia.  No seizures, headaches, incoordination, paraesthesias, vertigo, or tremors.  -- Behavioral/Psych: No auditory or visual hallucinations, depression, or suicidal/homicidal ideations.  -- Endocrine: No heat or cold intolerance, polydipsia, or unintentional weight gain / loss.  -- Allergy/Immunologic: No recurrent infections or adverse reaction to food, insects, or difficulty breathing.    Pain Scale  0 on scale of 1 to 10    PAST MEDICAL / SURGICAL HISTORY:     Past Medical History:   Diagnosis Date    CHF (congestive heart failure)     Coronary artery disease     CVA (cerebral vascular accident) 2013    Diabetes mellitus     ESRD (end stage renal disease)     Hypertension     Stroke      Past Surgical History:   Procedure Laterality Date    CARDIAC SURGERY      PTCA WITH STENT PLACEMENT    THYROID SURGERY           FAMILY HISTORY:     Family History   Problem Relation Age of Onset    Hypertension Father          SOCIAL HISTORY:     Social History   Substance Use Topics    Smoking status: Former Smoker     Quit date: 9/22/2000    Smokeless tobacco: Never Used    Alcohol use No     Social History     Social History    Marital status:      Spouse name: N/A    Number of children: N/A    Years of education: N/A     Social History Main Topics    Smoking status: Former Smoker     Quit date: 9/22/2000     Smokeless tobacco: Never Used    Alcohol use No    Drug use: No    Sexual activity: Not Currently     Other Topics Concern    None     Social History Narrative         ALLERGIES:       Review of patient's allergies indicates:  No Known Allergies      HEALTH SCREENING:     -- Prevnar 13 pneumonia vaccine = no evidence of previous vaccination found in the medical record  -- Influenza vaccine = no evidence of current flu vaccination found in the medical record for this flu season      HOME MEDICATIONS:     Prior to Admission medications    Medication Sig Start Date End Date Taking? Authorizing Provider   albuterol sulfate 2.5 mg/0.5 mL Nebu Take 2.5 mg by nebulization every 8 (eight) hours. 1/7/15 1/7/16  NITESH Comer, TASHIA   allopurinol (ZYLOPRIM) 100 MG tablet Take 100 mg by mouth every morning.     Historical Provider, MD   aspirin (ECOTRIN) 81 MG EC tablet Take 1 tablet (81 mg total) by mouth once daily. 7/7/15 7/6/16  NITESH Comer ANP   atorvastatin (LIPITOR) 40 MG tablet Take 1 tablet (40 mg total) by mouth once daily. 8/4/15   Nasim Torres MD   blood sugar diagnostic (CONTOUR TEST STRIPS) Strp USE TO CHECK BLOOD SUGAR THREE TIMES A DAY. 9/8/14   Historical Provider, MD   carvedilol (COREG) 25 MG tablet Take 1 tablet (25 mg total) by mouth 2 (two) times daily with meals. 4/7/16 5/7/16  SHAKEEL Cerrato   collagenase ointment Apply topically once daily. 7/23/15   NITESH Comer ANP   furosemide (LASIX) 80 MG tablet Take 1 tablet (80 mg total) by mouth once daily. 9/2/15   Francisco Trinidad MD   glimepiride (AMARYL) 2 MG tablet Take 2 mg by mouth daily with breakfast.    Historical Provider, MD   hydrocodone-acetaminophen 5-325mg (NORCO) 5-325 mg per tablet Take 1 tablet by mouth every 6 (six) hours as needed for Pain. 8/20/15   Nasim Torres MD   insulin detemir (LEVEMIR FLEXTOUCH) 100 unit/mL (3 mL) SubQ InPn pen Inject 5 Units into the skin. 8/14/15   Historical Provider,  MD   isosorbide mononitrate (IMDUR) 60 MG 24 hr tablet Take 1 tablet (60 mg total) by mouth once daily. 2/9/17   Nasim Torres MD   levothyroxine (SYNTHROID) 125 MCG tablet Take 125 mcg by mouth once daily.    Historical Provider, MD   lubiprostone (AMITIZA) 8 MCG Cap Take 8 mcg by mouth 2 (two) times daily.    Historical Provider, MD   miconazole (MICOTIN) 2 % cream Apply topically 2 (two) times daily. 1/7/15   NITESH Comer ANP   pantoprazole (PROTONIX) 40 MG tablet Take 1 tablet by mouth daily. 12/14/16   NITESH Comer ANP   protein (ENSURE HIGH PROTEIN) Powd Take by mouth 3 (three) times daily. 3 teaspoons PO TID    Historical Provider, MD   sevelamer carbonate (RENVELA) 800 mg Tab Take 1 tablet (800 mg total) by mouth 3 (three) times daily with meals. 4/7/16 5/7/16  SHAKEEL Cerrato   silver sulfADIAZINE 1% (SILVADENE) 1 % cream Apply 1 application topically 2 (two) times daily. 7/16/15   Nasim Torres MD   trazodone (DESYREL) 100 MG tablet Take 1 tablet (100 mg total) by mouth every evening. 10/27/16   Nasim Torres MD         [facility] MEDICATIONS:     Scheduled Meds:  Continuous Infusions:  PRN Meds:.      PHYSICAL EXAM:     Body mass index is 23.71 kg/(m^2).  Wt Readings from Last 1 Encounters:   03/07/17 2144 77.1 kg (169 lb 15.6 oz)       Vitals:    03/08/17 0203 03/08/17 0204 03/08/17 0233 03/08/17 0246   BP: (!) 118/49  (!) 133/57    BP Location:       Patient Position:       BP Method:       Pulse:  60  (!) 56   Resp:  (!) 27  14   Temp:       TempSrc:       SpO2:  95%  98%   Weight:       Height:              -- General appearance: Upper middle-aged black male who is lying in bed and appears comfortable.  No complaints.  well developed. appears stated age   -- Head: normocephalic, atraumatic   -- Eyes: conjunctivae clear. Extraocular muscles intact  -- Nose: Nares normal. Septum midline.   -- Throat: lips, mucosa, and tongue normal. no throat erythema.   -- Neck: supple,  symmetrical, trachea midline, no JVD and thyroid not grossly enlarged, appears symmetric  -- Lungs: Decreased speech breath sounds to auscultation bilaterally with prolonged expiratory phase and poor air excursion consistent long-term smoker.  normal respiratory effort. No use of accessory muscles.   -- Chest wall: no tenderness. equal bilateral chest rise   -- Heart: regular rate and rhythm. S1, S2 normal.  no click, rub or gallop   -- Abdomen: soft, non-tender, non-distended, non-tympanic; bowel sounds normal; no masses  -- Extremities: no cyanosis, clubbing or edema.  Dialysis access present  -- Pulses: 2+ and symmetric   -- Skin: color normal, texture normal, turgor normal. No rashes or lesions.   -- Neurologic:  Globally decreased muscle strength and tone.  Uses a Rollator to ambulate with. . CNII-XII intact. [place] coma scale: eyes open spontaneously-4, oriented & converses-5, obeys commands-6.      LABORATORY STUDIES:     Recent Results (from the past 36 hour(s))   POCT glucose    Collection Time: 03/07/17  9:46 PM   Result Value Ref Range    POCT Glucose 435 (H) 70 - 110 mg/dL   Magnesium    Collection Time: 03/07/17 10:09 PM   Result Value Ref Range    Magnesium 1.9 1.6 - 2.6 mg/dL   CBC auto differential    Collection Time: 03/07/17 10:09 PM   Result Value Ref Range    WBC 9.27 3.90 - 12.70 K/uL    RBC 4.23 (L) 4.60 - 6.20 M/uL    Hemoglobin 10.8 (L) 14.0 - 18.0 g/dL    Hematocrit 32.9 (L) 40.0 - 54.0 %    MCV 78 (L) 82 - 98 fL    MCH 25.5 (L) 27.0 - 31.0 pg    MCHC 32.8 32.0 - 36.0 %    RDW 15.5 (H) 11.5 - 14.5 %    Platelets 96 (L) 150 - 350 K/uL    MPV SEE COMMENT 9.2 - 12.9 fL    Gran # 7.7 1.8 - 7.7 K/uL    Lymph # 0.9 (L) 1.0 - 4.8 K/uL    Mono # 0.5 0.3 - 1.0 K/uL    Eos # 0.2 0.0 - 0.5 K/uL    Baso # 0.01 0.00 - 0.20 K/uL    Gran% 82.9 (H) 38.0 - 73.0 %    Lymph% 9.9 (L) 18.0 - 48.0 %    Mono% 5.5 4.0 - 15.0 %    Eosinophil% 1.9 0.0 - 8.0 %    Basophil% 0.1 0.0 - 1.9 %    Platelet Estimate  Decreased (A)     Large/Giant Platelets Present     Differential Method Automated    Comprehensive metabolic panel    Collection Time: 03/07/17 10:09 PM   Result Value Ref Range    Sodium 134 (L) 136 - 145 mmol/L    Potassium 4.8 3.5 - 5.1 mmol/L    Chloride 99 95 - 110 mmol/L    CO2 21 (L) 23 - 29 mmol/L    Glucose 416 (H) 70 - 110 mg/dL    BUN, Bld 82 (H) 8 - 23 mg/dL    Creatinine 6.0 (H) 0.5 - 1.4 mg/dL    Calcium 7.9 (L) 8.7 - 10.5 mg/dL    Total Protein 6.3 6.0 - 8.4 g/dL    Albumin 3.4 (L) 3.5 - 5.2 g/dL    Total Bilirubin 0.6 0.1 - 1.0 mg/dL    Alkaline Phosphatase 119 55 - 135 U/L    AST 19 10 - 40 U/L    ALT 23 10 - 44 U/L    Anion Gap 14 8 - 16 mmol/L    eGFR if African American 10 (A) >60 mL/min/1.73 m^2    eGFR if non African American 9 (A) >60 mL/min/1.73 m^2   Phosphorus    Collection Time: 03/07/17 10:09 PM   Result Value Ref Range    Phosphorus 4.4 2.7 - 4.5 mg/dL   CK    Collection Time: 03/07/17 10:09 PM   Result Value Ref Range     (H) 20 - 200 U/L   Troponin I    Collection Time: 03/07/17 10:09 PM   Result Value Ref Range    Troponin I 1.210 (H) 0.000 - 0.026 ng/mL   CK-MB    Collection Time: 03/07/17 10:09 PM   Result Value Ref Range     (H) 20 - 200 U/L    CPK MB 5.1 0.1 - 6.5 ng/mL    MB% 1.5 0.0 - 5.0 %   POCT glucose    Collection Time: 03/07/17 10:41 PM   Result Value Ref Range    POCT Glucose 260 (H) 70 - 110 mg/dL   Lipid panel    Collection Time: 03/07/17 11:26 PM   Result Value Ref Range    Cholesterol 79 (L) 120 - 199 mg/dL    Triglycerides 47 30 - 150 mg/dL    HDL 41 40 - 75 mg/dL    LDL Cholesterol 28.6 (L) 63.0 - 159.0 mg/dL    HDL/Chol Ratio 51.9 (H) 20.0 - 50.0 %    Total Cholesterol/HDL Ratio 1.9 (L) 2.0 - 5.0    Non-HDL Cholesterol 38 mg/dL   POCT glucose    Collection Time: 03/07/17 11:36 PM   Result Value Ref Range    POCT Glucose 197 (H) 70 - 110 mg/dL   Protime-INR    Collection Time: 03/07/17 11:43 PM   Result Value Ref Range    Prothrombin Time 12.0 9.0 -  12.5 sec    INR 1.1 0.8 - 1.2   POCT glucose    Collection Time: 03/08/17 12:55 AM   Result Value Ref Range    POCT Glucose 107 70 - 110 mg/dL   POCT glucose    Collection Time: 03/08/17  1:46 AM   Result Value Ref Range    POCT Glucose 84 70 - 110 mg/dL   Troponin I    Collection Time: 03/08/17  1:50 AM   Result Value Ref Range    Troponin I 3.552 (H) 0.000 - 0.026 ng/mL   POCT glucose    Collection Time: 03/08/17  2:28 AM   Result Value Ref Range    POCT Glucose 46 (LL) 70 - 110 mg/dL   POCT glucose    Collection Time: 03/08/17  2:48 AM   Result Value Ref Range    POCT Glucose 107 70 - 110 mg/dL       Lab Results   Component Value Date    INR 1.1 03/07/2017    INR 1.2 08/17/2015    INR 1.2 06/30/2015     Lab Results   Component Value Date    HGBA1C 10.6 (H) 03/14/2016     Recent Labs      03/07/17   2146  03/07/17   2241  03/07/17   2336  03/08/17   0055  03/08/17   0146  03/08/17   0228  03/08/17   0248   POCTGLUCOSE  435*  260*  197*  107  84  46*  107         IMAGING:     Chest x-ray  Evidence of pulmonary edema    CONSULTS:     IP CONSULT TO CARDIOLOGY  IP CONSULT TO NEPHROLOGY       ASSESSMENT & PLAN:     Primary Diagnosis:  NSTEMI (non-ST elevated myocardial infarction)    Active Hospital Problems    Diagnosis  POA    *NSTEMI (non-ST elevated myocardial infarction) [I21.4]  Yes     Priority: 1 - High    Volume overload [E87.70]  Yes     Priority: 2     CAD (coronary artery disease) [I25.10]  Yes     Priority: 3      [facility] 5/30/2014 CAD calcified small vessels: LM mild LAD mild proximal disease with very small D1 subtotal diffuse disease   [place] small caliber with mild disease LCx small OM2 and continuation vessels 80-90% RCA dominant with mild disease Renals: no significant stenosis; LV gram with LVEF 35% and 3-4+ MR            Personal history of noncompliance with medical treatment, presenting hazards to health [Z91.19]  Not Applicable     Priority: 4      Missed 2 dialysis appointments consecutively.   Now presents with volume overload and  non-ST elevation MI      Renovascular hypertension [I15.0]  Yes     Priority: 5     ESRD on hemodialysis [N18.6, Z99.2]  Not Applicable    Mitral valve replaced [Z95.2]  Not Applicable     Mitral valve replacement (Medtronic 31 mm bioprosthetic).      11/2014          Hyperlipidemia LDL goal <70 [E78.5]  Yes    Thyroid disease [E07.9]  Yes    Type 2 diabetes mellitus [E11.9]  Yes    Cerebrovascular disease [I67.9]  Yes      Resolved Hospital Problems    Diagnosis Date Resolved POA   No resolved problems to display.         NSTEMI (non-ST elevated myocardial infarction)  · Intitial EKG findings shows no definite ST elevation MI, but there are concerns with ST segment changes in V1, V2, V3 - consulting cardiologist STAT  · Initial troponin is within normal limits = 0.06  - repeat ordered and came back elevated at 1.210  · high risk for MI;  hypertension, hyperlipidemia, coronary artery disease, noncompliance, male gender, age, aspirin use   · Initiate ACS   · Trend cardiac enzymes  · Aspirin  · Beta-blocker  · Statin  · Supplemental oxygen  · nitroglycerine and morphine PRN  · 2D echocardiogram  · TSH, FT3, FT4  · ESR and cardiac specific CRP   · PT, PTT, INR   · Tight glycemic control  · Monitor on telemetry unit  · Consult cardiologist    Volume overload  · Secondary to noncompliance with dialysis  · Patient missed 2 dialysis sessions because he states he needed to be seen by pain management      CAD (coronary artery disease)  · evidence of coronary ischemia at this time as above  · Maintain adequate blood pressure control  · Heart healthy diet once nothing by mouth status is lifted  · Aspirin  · Statin    Personal history of noncompliance with medical treatment, presenting hazards to health  · Counseling given  · Noncompliance is the most prominent feature underlying the etiology of his presentation which includes volume overload, hypertension, and non-ST elevation  MI      Renovascular hypertension  · Goal while inpatient is a systolic blood pressure less than 140mmHg  · BP is not in acceptable range at this time; low threshold to initiate nitroglycerin drip until hemodialysis is initiated given his elevated troponin level as well  · Continue current home regimen with hold parameters  · PRN antihypertensives available        Cerebrovascular disease  · No evidence of acute stroke at this time  · Maintain adequate blood pressure control  · Heart healthy diet  · Aspirin  · Statin          Type 2 diabetes mellitus  Initially presented with a blood glucose level in 800s at the outside facility.  Our point-of-care testing here showed blood in his level of 435 after insulin was given.  Hemoglobin A1c from March 14, 2016 was 10.6, demonstrating uncontrolled diabetes.  Again, highly suspicious for medication and diet noncompliance.  · BG is not in acceptable range at this time  · Maintain w/ subcutaneous insulin management order set  · Hold oral diabetic meds  · ADA 1800 kcal diet  · BG goal while in patient is <180mg/dL  · HgA1c = Pending      Hyperlipidemia LDL goal <70  · Lipid panel - pending  · Cardiac diet  · Continue statin        Thyroid disease  Thyroid dysfunction  · Clinically, patient is euthyroid   · Chemically, undetermined  · Obtain TSH, free T3, and free T4  · Continue current regimen      Mitral valve replaced    · Supportive care    ESRD on hemodialysis  ·  acute issues with significant volume overload,   · No electrolyte abnormality, or acid-base abnormality at this time  · Will need urgent dialysis while inpatient  · Nephrology consulted                VTE Risk Mitigation         Ordered     Medium Risk of VTE  Once      03/07/17 2231     Reason for No Pharmacological VTE Prophylaxis  Once      03/07/17 2231            Adult PRN medications available   DVT prophylaxis given       DISPOSITION:     Will admit to the Hospital Medicine service for further evaluation and  treatment.    Chart reviewed and updated where applicable.    High Risk Conditions:  Patient has a condition that poses threat to life and bodily function: Non-ST elevation MI      ===============================================================    Ronan Guthrie MD, MPH  Department of Hospital Medicine   [facility]  [phone] pg  (7pm - 6am)            MDM   Reviewed: previous chart and vitals  Reviewed previous: labs, x-ray, EKG  Interpretation: labs, x-ray,  EKG    Total Critical Care time: 45 minutes. This excludes time spent performing separately reportable procedures and services.  Counseling/Conference Time: 15 minutes        This note is dictated using Dragon Medical 360 voice recognition software.  There are word recognition mistakes that are occasionally missed on review.

## 2017-03-08 NOTE — ASSESSMENT & PLAN NOTE
· Secondary to noncompliance with dialysis  · Patient missed 2 dialysis sessions because he states he needed to be seen by pain management

## 2017-03-08 NOTE — ASSESSMENT & PLAN NOTE
· Secondary to noncompliance with dialysis  · Patient missed 2 dialysis sessions because he states he needed to be seen by pain management,nephrlogy planing for HD,likely after LHC.

## 2017-03-08 NOTE — ASSESSMENT & PLAN NOTE
Thyroid dysfunction  · Clinically, patient is euthyroid   · Chemically, undetermined  ·   · Continue current regimen

## 2017-03-08 NOTE — CONSULTS
[facility]  Cardiology  Consult Note    Patient Name: Williams Bland  MRN: [id number]  Admission Date: 3/7/2017  Hospital Length of Stay: 1 days  Code Status: Full Code   Attending Provider: Manuel Greenwood MD   Consulting Provider: Rishi Vega MD  Primary Care Physician: Ronan Caldwell MD  Principal Problem:NSTEMI (non-ST elevated myocardial infarction)    Patient information was obtained from patient, past medical records and ER records.     Inpatient consult to Cardiology  Consult performed by: RISHI VEGA  Consult ordered by: RONAN DYER       NSTEMI  Subjective:     Chief Complaint:  sob    HPI: 67 y.o. male that (in part)  has a past medical history of CHF (congestive heart failure); Coronary artery disease; CVA (cerebral vascular accident) (2013); Diabetes mellitus; ESRD (end stage renal disease); Hypertension; and Stroke. Presents to [facility] from [facility] Emergency Department where he presented with shortness of breath. He reportedly missed 2 sessions of hemodialysis due to complaints of pain. He was trying to get in to see his pain management physician.      Patient is followed by Dr. Rouse at [facility].  His history of cardiac catheterization couple years ago which showed obstructive disease but was medically managed at that time.  Is a history of severe MR status post mitral valve replacement.  He has severe peripheral vascular disease and recently missed hemodialysis sessions causing worsening shortness of breath.  Cardiac markers are suggestive of possible small non-STEMI.  He denies any current chest pain.    Past Medical History:   Diagnosis Date    CHF (congestive heart failure)     Coronary artery disease     CVA (cerebral vascular accident) 2013    Diabetes mellitus     ESRD (end stage renal disease)     Hypertension     Stroke        Past Surgical History:   Procedure Laterality Date    CARDIAC SURGERY       PTCA WITH STENT PLACEMENT    THYROID SURGERY         Review of patient's allergies indicates:  No Known Allergies    No current facility-administered medications on file prior to encounter.      Current Outpatient Prescriptions on File Prior to Encounter   Medication Sig    albuterol sulfate 2.5 mg/0.5 mL Nebu Take 2.5 mg by nebulization every 8 (eight) hours.    allopurinol (ZYLOPRIM) 100 MG tablet Take 100 mg by mouth every morning.     aspirin (ECOTRIN) 81 MG EC tablet Take 1 tablet (81 mg total) by mouth once daily.    atorvastatin (LIPITOR) 40 MG tablet Take 1 tablet (40 mg total) by mouth once daily.    blood sugar diagnostic (CONTOUR TEST STRIPS) Strp USE TO CHECK BLOOD SUGAR THREE TIMES A DAY.    carvedilol (COREG) 25 MG tablet Take 1 tablet (25 mg total) by mouth 2 (two) times daily with meals.    collagenase ointment Apply topically once daily.    furosemide (LASIX) 80 MG tablet Take 1 tablet (80 mg total) by mouth once daily.    glimepiride (AMARYL) 2 MG tablet Take 2 mg by mouth daily with breakfast.    hydrocodone-acetaminophen 5-325mg (NORCO) 5-325 mg per tablet Take 1 tablet by mouth every 6 (six) hours as needed for Pain.    insulin detemir (LEVEMIR FLEXTOUCH) 100 unit/mL (3 mL) SubQ InPn pen Inject 5 Units into the skin.    isosorbide mononitrate (IMDUR) 60 MG 24 hr tablet Take 1 tablet (60 mg total) by mouth once daily.    levothyroxine (SYNTHROID) 125 MCG tablet Take 125 mcg by mouth once daily.    lubiprostone (AMITIZA) 8 MCG Cap Take 8 mcg by mouth 2 (two) times daily.    miconazole (MICOTIN) 2 % cream Apply topically 2 (two) times daily.    pantoprazole (PROTONIX) 40 MG tablet Take 1 tablet by mouth daily.    protein (ENSURE HIGH PROTEIN) Powd Take by mouth 3 (three) times daily. 3 teaspoons PO TID    sevelamer carbonate (RENVELA) 800 mg Tab Take 1 tablet (800 mg total) by mouth 3 (three) times daily with meals.    silver sulfADIAZINE 1% (SILVADENE) 1 % cream Apply 1  application topically 2 (two) times daily.    trazodone (DESYREL) 100 MG tablet Take 1 tablet (100 mg total) by mouth every evening.     Family History     Problem Relation (Age of Onset)    Hypertension Father        Social History Main Topics    Smoking status: Former Smoker     Quit date: 9/22/2000    Smokeless tobacco: Never Used    Alcohol use No    Drug use: No    Sexual activity: Not Currently     Review of Systems   Constitution: Negative.   HENT: Negative.    Eyes: Negative.    Cardiovascular: Negative for chest pain, dyspnea on exertion, irregular heartbeat, leg swelling, near-syncope, orthopnea, palpitations, paroxysmal nocturnal dyspnea and syncope.   Respiratory: Negative for shortness of breath.    Skin: Negative.    Musculoskeletal: Negative.    Gastrointestinal: Negative for abdominal pain, constipation and diarrhea.   Genitourinary: Negative for dysuria.   Neurological: Negative for dizziness.   Psychiatric/Behavioral: Negative.      Objective:     Vital Signs (Most Recent):  Temp: 98.1 °F (36.7 °C) (03/08/17 0730)  Pulse: 64 (03/08/17 0800)  Resp: (!) 27 (03/08/17 0800)  BP: (!) 151/64 (03/08/17 0800)  SpO2: 96 % (03/08/17 0800) Vital Signs (24h Range):  Temp:  [98.1 °F (36.7 °C)-99.9 °F (37.7 °C)] 98.1 °F (36.7 °C)  Pulse:  [54-70] 64  Resp:  [12-37] 27  SpO2:  [95 %-100 %] 96 %  BP: (112-172)/(49-96) 151/64     Weight: 77.1 kg (169 lb 15.6 oz)  Body mass index is 23.71 kg/(m^2).    SpO2: 96 %  O2 Device (Oxygen Therapy): nasal cannula      Intake/Output Summary (Last 24 hours) at 03/08/17 0836  Last data filed at 03/08/17 0600   Gross per 24 hour   Intake             5.83 ml   Output                0 ml   Net             5.83 ml       Lines/Drains/Airways     Drain                 Hemodialysis AV Fistula Left upper arm -- days          Peripheral Intravenous Line                 Peripheral IV - Single Lumen 03/07/17 Right Forearm 1 day         Peripheral IV - Single Lumen 03/07/17 9410  Right Antecubital less than 1 day                Physical Exam   Constitutional: He is oriented to person, place, and time. He appears well-developed and well-nourished. No distress.   HENT:   Head: Normocephalic and atraumatic.   Eyes: Conjunctivae and EOM are normal. Pupils are equal, round, and reactive to light.   Neck: Normal range of motion. Neck supple. No thyromegaly present.   Cardiovascular: Normal rate, regular rhythm and normal heart sounds.    No murmur heard.  Pulmonary/Chest: Effort normal and breath sounds normal. No respiratory distress. He has no wheezes. He has no rales. He exhibits no tenderness.   Abdominal: Soft. Bowel sounds are normal.   Musculoskeletal: He exhibits no edema.   Neurological: He is alert and oriented to person, place, and time.   Skin: Skin is warm and dry.   Psychiatric: He has a normal mood and affect. His behavior is normal.       Significant Labs:   CMP   Recent Labs  Lab 03/07/17 2209   *   K 4.8   CL 99   CO2 21*   *   BUN 82*   CREATININE 6.0*   CALCIUM 7.9*   PROT 6.3   ALBUMIN 3.4*   BILITOT 0.6   ALKPHOS 119   AST 19   ALT 23   ANIONGAP 14   ESTGFRAFRICA 10*   EGFRNONAA 9*   , CBC   Recent Labs  Lab 03/07/17 2209   WBC 9.27   HGB 10.8*   HCT 32.9*   PLT 96*   , INR   Recent Labs  Lab 03/07/17  2343   INR 1.1   , Lipid Panel   Recent Labs  Lab 03/07/17  2326   CHOL 79*   HDL 41   LDLCALC 28.6*   TRIG 47   CHOLHDL 51.9*    and Troponin   Recent Labs  Lab 03/07/17 2209 03/08/17  0150   TROPONINI 1.210* 3.552*       Significant Imaging: Echocardiogram:   2D echo with color flow doppler:   Results for orders placed or performed during the hospital encounter of 08/29/15   2D echo with color flow doppler   Result Value Ref Range    EF 45 55 - 65    Est. PA Systolic Pressure 19.83     Tricuspid Valve Regurgitation TRIVIAL      Assessment and Plan:     Active Diagnoses:    Diagnosis Date Noted POA    PRINCIPAL PROBLEM:  NSTEMI (non-ST elevated myocardial  infarction) [I21.4] 03/07/2017 Yes    Renovascular hypertension [I15.0] 03/07/2017 Yes    Personal history of noncompliance with medical treatment, presenting hazards to health [Z91.19] 03/07/2017 Not Applicable    ESRD on hemodialysis [N18.6, Z99.2]  Not Applicable    Volume overload [E87.70] 08/29/2015 Yes    Mitral valve replaced [Z95.2] 03/26/2015 Not Applicable    Hyperlipidemia LDL goal <70 [E78.5] 09/18/2014 Yes    Thyroid disease [E07.9] 09/18/2014 Yes    DM (diabetes mellitus) type II controlled with renal manifestation [E11.29] 06/02/2014 Yes    CAD (coronary artery disease) [I25.10] 05/08/2014 Yes    Cerebrovascular disease [I67.9] 05/08/2014 Yes      Problems Resolved During this Admission:    Diagnosis Date Noted Date Resolved POA       VTE Risk Mitigation         Ordered     Medium Risk of VTE  Once      03/07/17 2231     Reason for No Pharmacological VTE Prophylaxis  Once      03/07/17 2231        #Non-STEMI- plan for cardiac catheterization.  Loaded with Plavix but otherwise on adequate medicine regimen for secondary prevention  #End-stage renal disease  #History of mitral valve replacement  #Type 2 diabetes  #Hypertension  #Dyslipidemia    ICU time spent 35 minutes    **Risks/benefits of the procedure were d/w the patient including bleeding, infection, death, mi, arrhythmia, kidney failure, stroke, etc.  Patient understands and consent was placed on the chart.    Thank you for your consult. I will follow-up with patient. Please contact us if you have any additional questions.    Rishi De La Rosa MD  Cardiology   [facility]

## 2017-03-08 NOTE — ASSESSMENT & PLAN NOTE
· Goal while inpatient is a systolic blood pressure less than 140mmHg  · BP is not in acceptable range at this time; low threshold to initiate nitroglycerin drip until hemodialysis is initiated given his elevated troponin level as well  · Continue current home regimen with hold parameters  · PRN antihypertensives available

## 2017-03-08 NOTE — SUBJECTIVE & OBJECTIVE
Interval History:Patient denies chest pain and  SOB at this time.    Review of Systems   Constitutional: Negative for activity change and appetite change.   HENT: Negative for congestion and dental problem.    Eyes: Negative for discharge and itching.   Respiratory: Negative for apnea, chest tightness and shortness of breath.    Cardiovascular: Negative for chest pain and leg swelling.   Gastrointestinal: Negative for abdominal distention and abdominal pain.   Endocrine: Negative for cold intolerance and heat intolerance.   Genitourinary: Negative for difficulty urinating.   Musculoskeletal: Negative for arthralgias and back pain.   Allergic/Immunologic: Negative for environmental allergies and food allergies.   Neurological: Negative for dizziness.   Hematological: Negative for adenopathy.   Psychiatric/Behavioral: Negative for agitation and behavioral problems.     Objective:     Vital Signs (Most Recent):  Temp: 98.1 °F (36.7 °C) (03/08/17 0730)  Pulse: 64 (03/08/17 0800)  Resp: (!) 27 (03/08/17 0800)  BP: (!) 151/64 (03/08/17 0800)  SpO2: 96 % (03/08/17 0800) Vital Signs (24h Range):  Temp:  [98.1 °F (36.7 °C)-99.9 °F (37.7 °C)] 98.1 °F (36.7 °C)  Pulse:  [54-70] 64  Resp:  [12-37] 27  SpO2:  [95 %-100 %] 96 %  BP: (112-172)/(49-96) 151/64     Weight: 77.1 kg (169 lb 15.6 oz)  Body mass index is 23.71 kg/(m^2).    Intake/Output Summary (Last 24 hours) at 03/08/17 0810  Last data filed at 03/08/17 0600   Gross per 24 hour   Intake             5.83 ml   Output                0 ml   Net             5.83 ml      Physical Exam   Constitutional: He is oriented to person, place, and time. No distress.   HENT:   Head: Normocephalic and atraumatic.   Eyes: EOM are normal. Pupils are equal, round, and reactive to light.   Neck: Normal range of motion. Neck supple.   Cardiovascular: Normal rate.    Pulmonary/Chest: Effort normal.   Abdominal: Soft. Bowel sounds are normal.   Musculoskeletal: He exhibits no edema.    Neurological: He is oriented to person, place, and time. No cranial nerve deficit. Coordination normal.   Skin: Skin is warm and dry. He is not diaphoretic.   Psychiatric: He has a normal mood and affect. His behavior is normal.       Significant Labs:   BMP:   Recent Labs  Lab 03/07/17 2209   *   *   K 4.8   CL 99   CO2 21*   BUN 82*   CREATININE 6.0*   CALCIUM 7.9*   MG 1.9     CBC:   Recent Labs  Lab 03/07/17 2209   WBC 9.27   HGB 10.8*   HCT 32.9*   PLT 96*     Troponin:   Recent Labs  Lab 03/07/17 2209 03/08/17  0150   TROPONINI 1.210* 3.552*

## 2017-03-08 NOTE — CONSULTS
Williams Bland is a 67 y.o. male patient.  Pt is a HD pt of Dr. Simpson  He last had HD one week ago  Transferred with NSTEMI  To have [facility] today  Feels better now  No sob of CP  Scheduled Meds:   sodium chloride 0.9%   Intravenous Once    albuterol sulfate  2.5 mg Nebulization Q8H    allopurinol  100 mg Oral QAM    aspirin  325 mg Oral Daily    atorvastatin  60 mg Oral Daily    carvedilol  25 mg Oral BID WM    insulin detemir  7 Units Subcutaneous BID    levothyroxine  125 mcg Oral Daily    pantoprazole  40 mg Oral Daily    polyethylene glycol  17 g Oral Daily    senna-docusate 8.6-50 mg  1 tablet Oral BID    sevelamer carbonate  800 mg Oral TID WM    sodium chloride 0.9%  3 mL Intravenous Q8H       Review of patient's allergies indicates:  No Known Allergies    Past Medical History:   Diagnosis Date    CHF (congestive heart failure)     Coronary artery disease     CVA (cerebral vascular accident) 2013    Diabetes mellitus     ESRD (end stage renal disease)     Hypertension     Stroke      Past Surgical History:   Procedure Laterality Date    CARDIAC SURGERY      PTCA WITH STENT PLACEMENT    THYROID SURGERY        reports that he quit smoking about 16 years ago. He has never used smokeless tobacco. He reports that he does not drink alcohol or use illicit drugs.   Family History   Problem Relation Age of Onset    Hypertension Father           Vital Signs Range (Last 24H):  Temp:  [98.1 °F (36.7 °C)-99.9 °F (37.7 °C)]   Pulse:  [54-70]   Resp:  [10-37]   BP: (112-172)/(49-96)   SpO2:  [94 %-100 %]     I & O (Last 24H):  Intake/Output Summary (Last 24 hours) at 03/08/17 1309  Last data filed at 03/08/17 0800   Gross per 24 hour   Intake            55.83 ml   Output                0 ml   Net            55.83 ml           Physical Exam:  General appearance: well developed, well nourished, appears older than stated age  Lungs:  clear to auscultation bilaterally, normal respiratory effort and normal  percussion bilaterally  Heart: regular rate and rhythm, S1, S2 normal, no murmur, click, rub or gallop and S4 present  Abdomen: soft, non-tender non-distented; bowel sounds normal; no masses,  no organomegaly  Extremities: no cyanosis or edema, or clubbing  LUE avf with thrill, bruit, NVI  Laboratory:  CBC:   Recent Labs  Lab 03/07/17 2209   WBC 9.27   RBC 4.23*   HGB 10.8*   HCT 32.9*   PLT 96*   MCV 78*   MCH 25.5*   MCHC 32.8     BMP:   Recent Labs  Lab 03/08/17  1149   *   *   K 4.7      CO2 22*   BUN 91*   CREATININE 6.0*   CALCIUM 7.9*   MG 1.8     Cardiac markers:   Recent Labs  Lab 03/07/17 2209 03/08/17  0829   CPKMB 5.1  --   --    TROPONINI 1.210*  < > 4.035*   < > = values in this interval not displayed.      Diagnostic Results:      1. ESRD  2. Anemia of CKD  3. NSTEMI  4. Type 2 DM  5. MVR    LHC today  HD after C  Will follow while admitted  EPO as needed      Maryam Hong  3/8/2017

## 2017-03-08 NOTE — PROGRESS NOTES
"2135- Pt arrived to ICU room 266 via EMS from [facility] ER as direct admit.  Transferred to ICU bed and placed on monitors.  Full assessment completed.  VSS.  AAOx4.  Denies chest pain/pressure and SOB.  States he feels "much better than earlier".  Dr. Guthrie notified of pt's arrival.  Will monitor.  Pt's cell phone at bedside - pt spoke with a friend and notified him that he is at [facility].  Asked patient if he would like RN to call any family members, pt states "no I will call them in the morning".     2245 - Dr. Guthrie paged concerning new orders.  Pt arrived on an insulin drip at 10u/hr.  Last blood sugar 260.  MD states continue drip at 5u/hr.  MD also notified of recent EKG results.  Went over labs from [facility] with Dr. uGthrie including troponin 0.06, K 4.9.  Pt denies chest pain and states breathing is "much better".  O2 weaned down to 2L nasal cannula.  Stat labs collected upon arrival to unit, awaiting results.  MD states place routine consult to nephrology for HD tomorrow morning.  Will continue to monitor.    0210 - Dr. Guthrie notified that insulin drip was stopped due to CBG of 84.  New orders received    0340 - Dr. Guthrie notified of troponin 3.55.  No new orders received.    0415 - Dr. Guthrie at bedside to assess patient.  No new orders.   "

## 2017-03-09 NOTE — PROGRESS NOTES
The pt was received on 3 liters nasal cannula with a sat of 98%. No distress was noted at this time. His breath sounds are clear. He tolerated his treatment with RADHA.

## 2017-03-09 NOTE — PLAN OF CARE
03/09/17 1005   Discharge Assessment   Assessment Type Discharge Planning Assessment   Confirmed/corrected address and phone number on facesheet? Yes   Assessment information obtained from? Patient;Medical Record   Expected Length of Stay (days) 2   Communicated expected length of stay with patient/caregiver yes   Arrived From acute care hospital;other (see comments)  (ER from [facility])   Lives With spouse   Able to Return to Prior Arrangements yes   Is patient able to care for self after discharge? Yes   How many people do you have in your home that can help with your care after discharge? 1   Who are your caregiver(s) and their phone number(s)? cousin Simone Richardson--unable to provide number but expects him to arrive this hospital soon   Patient's perception of discharge disposition home or selfcare   Readmission Within The Last 30 Days no previous admission in last 30 days   Patient currently being followed by outpatient case management? Unable to determine (comments)   Patient currently receives home health services? No   Does the patient currently use HME? Yes   Patient currently receives private duty nursing? No   Patient currently receives any other outside agency services? No   Equipment Currently Used at Home rollator   Do you have any problems affording any of your prescribed medications? No   Is the patient taking medications as prescribed? yes   Do you have any financial concerns preventing you from receiving the healthcare you need? No   Does the patient have transportation to healthcare appointments? Yes   Transportation Available car;family or friend will provide   On Dialysis? Yes   If yes, what is the name of the dialysis unit? [facility] ([facility]) M-W-F   Does the patient receive outpatient dialysis? Yes   Does the patient receive services at the [facility]madin Clinic? No   Discharge Plan A Home with family   Patient/Family In Agreement With Plan yes   SW met with patient in ICU, explained  "role of SW/CM with treatment team, provided contact information with the "discharge planning begins on admission" checklist handout. Confirmed information in demographics: no changes. SW reviewed the discharge planning folder, explained to leave in room with patient so that team/patient can all written discharge information through out hospital stay.  SW will follow in ICU and assist as needed.    Contacted PCP, first available appointment on non HD day provided ([facility] ). Will fax d/c summary to [phone]..  Contacted  number --answering service/Alee in [facility] office confirmed; schedule--obtained fax number to provide d/c summary, clinical information: [phone]  Contacted Dr Torres--no appointment timely in [facility]--awaiting return call from [facility] with appointment in [place]--per patient okay for [place] on T/Th.  "

## 2017-03-09 NOTE — DISCHARGE SUMMARY
[facility]  Hospital Medicine  Discharge Summary      Patient Name: Williams Bland  MRN: [id number]  Admission Date: 3/7/2017  Hospital Length of Stay: 2 days  Discharge Date and Time:  03/09/2017 8:25 AM  Attending Physician: Manuel Greenwood MD   Discharging Provider: Manuel Greenwood MD  Primary Care Provider: Ronan Caldwell MD      HPI:   Williams Bland is a 67 y.o. male that (in part)  has a past medical history of CHF (congestive heart failure); Coronary artery disease; CVA (cerebral vascular accident) (2013); Diabetes mellitus; ESRD (end stage renal disease); Hypertension; and Stroke. Presents to [facility] from [facility] Emergency Department where he presented with shortness of breath. He reportedly missed 2 sessions of hemodialysis due to complaints of pain. He was trying to get in to see his pain management physician.       Description of symptoms  Location: Lungs  Onset: Subacute onset  Character: Shortness of breath, dyspnea with exertion  Frequency: These of multiple recurrent episodes after missing dialysis previously  Duration: Constant  Associated Symptoms: Denies chest pain, palpitations, or syncope. No fever or chills.  Radiation: No radiation  Exacerbating factors: Exertion  Relieving factors: Supplemental oxygen provided in the ED.     In the emergency department prior to transfer routine laboratory studies, EKG, and cardiac enzymes were obtained. There is evidence of non-ST elevation MI and volume overload from noncompliance with hemodialysis.     Hospital medicine has been asked to admit for further evaluation and treatment.       Procedure(s) (LRB):  HEART CATH-LEFT (Left)      Indwelling Lines/Drains at time of discharge:   Lines/Drains/Airways     Drain                 Hemodialysis AV Fistula Left upper arm -- days              Hospital Course:   Williams Bland is a 67 y.o. male that (in part)  has a past medical history of CHF  (congestive heart failure); Coronary artery disease; CVA (cerebral vascular accident) (2013); Diabetes mellitus; ESRD (end stage renal disease); Hypertension; and Stroke. Presents to [facility] from [facility] Emergency Department where he presented with shortness of breath. He reportedly missed 2 sessions of hemodialysis due to complaints of pain. He was trying to get in to see his pain management physician.       Description of symptoms  Location: Lungs  Onset: Subacute onset  Character: Shortness of breath, dyspnea with exertion  Frequency: These of multiple recurrent episodes after missing dialysis previously  Duration: Constant  Associated Symptoms: Denies chest pain, palpitations, or syncope. No fever or chills.  Radiation: No radiation  Exacerbating factors: Exertion  Relieving factors: Supplemental oxygen provided in the ED.     In the emergency department prior to transfer routine laboratory studies, EKG, and cardiac enzymes were obtained. There is evidence of non-ST elevation MI and volume overload from noncompliance with hemodialysis.  He has been diagnosed with NSTEMI and cardiology performed R/LHC,but could not placed,stent on RCA duo to calcification from prior stent,but his chest pain is resolved and patient was  Medical treatment,his elevated blood pressure with tridal drip resolved.  He has his routine HD in hospital stay and his SOB resolved.  He will follow with PCP,own cardiology in [place] and HD in next ew days.  ACS medication with Plavix,ACE at D/C time has bene prescribed.            Consults:   Consults         Status Ordering Provider     Inpatient consult to Cardiology  Once     Provider:  Rishi De La Rosa MD    Completed HUMZA DYER     Inpatient consult to Nephrology  Once     Provider:  Maryam Hong MD    Acknowledged HUMZA DYER          Significant Diagnostic Studies: Labs:   BMP:   Recent Labs  Lab 03/07/17  2209 03/08/17  1149  03/09/17  0125   * 177* 134*   * 135* 132*   K 4.8 4.7 4.0   CL 99 101 102   CO2 21* 22* 17*   BUN 82* 91* 33*   CREATININE 6.0* 6.0* 3.0*   CALCIUM 7.9* 7.9* 8.7   MG 1.9 1.8 1.8   , CBC   Recent Labs  Lab 03/07/17  2209   WBC 9.27   HGB 10.8*   HCT 32.9*   PLT 96*   , Lipid Panel   Lab Results   Component Value Date    CHOL 79 (L) 03/07/2017    HDL 41 03/07/2017    LDLCALC 28.6 (L) 03/07/2017    TRIG 47 03/07/2017    CHOLHDL 51.9 (H) 03/07/2017    and Troponin   Recent Labs  Lab 03/08/17  0829   TROPONINI 4.035*     Radiology: X-Ray: CXR: X-Ray Chest 1 View (CXR): No results found for this visit on 03/07/17.  Cardiac Graphics: Echocardiogram:   2D echo with color flow doppler:   Results for orders placed or performed during the hospital encounter of 03/07/17   2D echo with color flow doppler   Result Value Ref Range    EF 35 (A) 55 - 65    Mitral Valve Regurgitation MILD     Est. PA Systolic Pressure 88.38 (A)     Pericardial Effusion NONE     Tricuspid Valve Regurgitation MODERATE TO SEVERE (A)      Angiography: [facility]     Pending Diagnostic Studies:     Procedure Component Value Units Date/Time    Hepatitis B Surface Antibody, Qual/Quant [[id number]] Collected:  03/08/17 2223    Order Status:  Sent Lab Status:  In process Updated:  03/08/17 2236    Specimen:  Blood from Blood     Narrative:       Dialysis RN to draw.  Receive In Basket/Push notification of result?->Yes    Hepatitis B surface antigen [[id number]] Collected:  03/08/17 2223    Order Status:  Sent Lab Status:  In process Updated:  03/09/17 0706    Specimen:  Blood from Blood     Narrative:       Dialysis RN to draw.  Receive In Basket/Push notification of result?->Yes        Final Active Diagnoses:    Diagnosis Date Noted POA    PRINCIPAL PROBLEM:  NSTEMI (non-ST elevated myocardial infarction) [I21.4] 03/07/2017 Yes    Renovascular hypertension [I15.0] 03/07/2017 Yes    Personal history of noncompliance with medical treatment, presenting  hazards to health [Z91.19] 03/07/2017 Not Applicable    ESRD on hemodialysis [N18.6, Z99.2]  Not Applicable    Volume overload [E87.70] 08/29/2015 Yes    Mitral valve replaced [Z95.2] 03/26/2015 Not Applicable    Hyperlipidemia LDL goal <70 [E78.5] 09/18/2014 Yes    Thyroid disease [E07.9] 09/18/2014 Yes    DM (diabetes mellitus) type II controlled with renal manifestation [E11.29] 06/02/2014 Yes    CAD (coronary artery disease) [I25.10] 05/08/2014 Yes    Cerebrovascular disease [I67.9] 05/08/2014 Yes      Problems Resolved During this Admission:    Diagnosis Date Noted Date Resolved POA      No new Assessment & Plan notes have been filed under this hospital service since the last note was generated.  Service: Hospital Medicine      Discharged Condition: stable    Disposition: Home or Self Care    Follow Up:  Follow-up Information     Follow up with Ronan Caldwell MD In 1 week.    Specialty:  Family Medicine    Contact information:    [address]  [address]  [place] [address]  [phone]          Follow up with Nasim Torres MD In 1 week.    Specialty:  Cardiology    Contact information:    [address]  [address]  [facility] [address] [phone]          Patient Instructions:     Diet general     Activity as tolerated       Medications:  Reconciled Home Medications:   Current Discharge Medication List      START taking these medications    Details   amlodipine (NORVASC) 10 MG tablet Take 1 tablet (10 mg total) by mouth once daily.  Qty: 30 tablet, Refills: 0      clopidogrel (PLAVIX) 75 mg tablet Take 1 tablet (75 mg total) by mouth once daily.  Qty: 30 tablet, Refills: 0      lisinopril (PRINIVIL,ZESTRIL) 20 MG tablet Take 1 tablet (20 mg total) by mouth once daily.  Qty: 30 tablet, Refills: 0         CONTINUE these medications which have CHANGED    Details   aspirin (ECOTRIN) 81 MG EC tablet Take 1 tablet (81 mg total) by mouth once daily.  Refills: 0         CONTINUE these  medications which have NOT CHANGED    Details   albuterol sulfate 2.5 mg/0.5 mL Nebu Take 2.5 mg by nebulization every 8 (eight) hours.  Qty: 225 mg, Refills: 11      allopurinol (ZYLOPRIM) 100 MG tablet Take 100 mg by mouth every morning.       atorvastatin (LIPITOR) 40 MG tablet Take 1 tablet (40 mg total) by mouth once daily.  Qty: 30 tablet, Refills: 11      blood sugar diagnostic (CONTOUR TEST STRIPS) Strp USE TO CHECK BLOOD SUGAR THREE TIMES A DAY.      carvedilol (COREG) 25 MG tablet Take 1 tablet (25 mg total) by mouth 2 (two) times daily with meals.  Qty: 60 tablet, Refills: 0      collagenase ointment Apply topically once daily.  Qty: 30 g, Refills: 0      furosemide (LASIX) 80 MG tablet Take 1 tablet (80 mg total) by mouth once daily.  Qty: 90 tablet, Refills: 3    Comments: Hold diuretics and if weight gain up to 4 lbs then restart (per nephrology).  Weigh yourself daily.      glimepiride (AMARYL) 2 MG tablet Take 2 mg by mouth daily with breakfast.      hydrocodone-acetaminophen 5-325mg (NORCO) 5-325 mg per tablet Take 1 tablet by mouth every 6 (six) hours as needed for Pain.  Qty: 40 tablet, Refills: 0      insulin detemir (LEVEMIR FLEXTOUCH) 100 unit/mL (3 mL) SubQ InPn pen Inject 5 Units into the skin.      isosorbide mononitrate (IMDUR) 60 MG 24 hr tablet Take 1 tablet (60 mg total) by mouth once daily.  Qty: 30 tablet, Refills: 3      levothyroxine (SYNTHROID) 125 MCG tablet Take 125 mcg by mouth once daily.      lubiprostone (AMITIZA) 8 MCG Cap Take 8 mcg by mouth 2 (two) times daily.      miconazole (MICOTIN) 2 % cream Apply topically 2 (two) times daily.  Qty: 92 g, Refills: 0      pantoprazole (PROTONIX) 40 MG tablet Take 1 tablet by mouth daily.  Qty: 30 tablet, Refills: 11      protein (ENSURE HIGH PROTEIN) Powd Take by mouth 3 (three) times daily. 3 teaspoons PO TID      sevelamer carbonate (RENVELA) 800 mg Tab Take 1 tablet (800 mg total) by mouth 3 (three) times daily with meals.  Qty: 90  tablet, Refills: 0      silver sulfADIAZINE 1% (SILVADENE) 1 % cream Apply 1 application topically 2 (two) times daily.  Qty: 1 Bottle, Refills: 11      trazodone (DESYREL) 100 MG tablet Take 1 tablet (100 mg total) by mouth every evening.  Qty: 30 tablet, Refills: 11    Associated Diagnoses: Insomnia           Time spent on the discharge of patient: 30  minutes    Manuel Greenwood MD  Department of Hospital Medicine  [facility]

## 2017-03-09 NOTE — PROGRESS NOTES
Spoke with Jackeline JASSO in ICU, LHC still not initiated. Dr. Hong made aware. Will check back later. Hd still on hold until LHC completed.

## 2017-03-09 NOTE — PROGRESS NOTES
RO machine and HD prepared for initiation of treatment. HD:F160, BFR//800, UF 2 L as tolerated. BATH: 3K+, 2.5 CA++. Na+ 135, HCO3-- 30. Pt is noted with a Left UE AVF, accessed according to P&P, using 15 ga dialysis angio's x 2, good blood return and easily flushed. Hd tx intiated , will continue to monitor for changes and trends.

## 2017-03-09 NOTE — PROGRESS NOTES
[facility]  Cardiology  Progress Note    Patient Name: Williams Bland  MRN: [id number]  Admission Date: 3/7/2017  Hospital Length of Stay: 2 days  Code Status: Full Code   Attending Physician: Manuel Greenwood MD   Primary Care Physician: Ronan Caldwell MD  Expected Discharge Date:   Principal Problem:NSTEMI (non-ST elevated myocardial infarction)    Subjective:     Hospital Course: NSTEMI    Interval History: NO cp overnight    Review of Systems   All other systems reviewed and are negative.    Objective:     Vital Signs (Most Recent):  Temp: 98.5 °F (36.9 °C) (03/09/17 0300)  Pulse: 66 (03/09/17 0600)  Resp: 19 (03/09/17 0600)  BP: (!) 154/51 (03/09/17 0600)  SpO2: 100 % (03/09/17 0500) Vital Signs (24h Range):  Temp:  [97.1 °F (36.2 °C)-99.8 °F (37.7 °C)] 98.5 °F (36.9 °C)  Pulse:  [56-74] 66  Resp:  [10-27] 19  SpO2:  [76 %-100 %] 100 %  BP: ()/(45-79) 154/51     Weight: 75.8 kg (167 lb 1.7 oz)  Body mass index is 23.31 kg/(m^2).    SpO2: 100 %  O2 Device (Oxygen Therapy): nasal cannula      Intake/Output Summary (Last 24 hours) at 03/09/17 0821  Last data filed at 03/09/17 0130   Gross per 24 hour   Intake              400 ml   Output             3050 ml   Net            -2650 ml       Lines/Drains/Airways     Drain                 Hemodialysis AV Fistula Left upper arm -- days          Peripheral Intravenous Line                 Peripheral IV - Single Lumen 03/07/17 Right Forearm 2 days         Peripheral IV - Single Lumen 03/07/17 2140 Right Antecubital 1 day                Physical Exam   Constitutional: He appears well-developed and well-nourished.   Cardiovascular: Normal rate and regular rhythm.    Pulmonary/Chest: Effort normal and breath sounds normal.   Musculoskeletal:   No hematoma/bruit       Significant Labs:   BMP:   Recent Labs  Lab 03/07/17  2209 03/08/17  1149 03/09/17  0125   * 177* 134*   * 135* 132*   K 4.8 4.7 4.0   CL 99 101 102   CO2 21* 22*  17*   BUN 82* 91* 33*   CREATININE 6.0* 6.0* 3.0*   CALCIUM 7.9* 7.9* 8.7   MG 1.9 1.8 1.8    and CBC   Recent Labs  Lab 03/07/17  2209   WBC 9.27   HGB 10.8*   HCT 32.9*   PLT 96*       Significant Imaging: Echocardiogram:   2D echo with color flow doppler:   Results for orders placed or performed during the hospital encounter of 03/07/17   2D echo with color flow doppler   Result Value Ref Range    EF 35 (A) 55 - 65    Mitral Valve Regurgitation MILD     Est. PA Systolic Pressure 88.38 (A)     Pericardial Effusion NONE     Tricuspid Valve Regurgitation MODERATE TO SEVERE (A)      LHC:  Diffuse in-stent restenosis of RCA stents and distal 80% lesion past the stents in the midportion of RCA. Initially angioplasty was performed using a 3.0 and 3.5 mm noncompliant balloons distally initially and then pulled back to the proximal portion of the stents. Multiple attempts to deliver stent with a R2 guide with ty wire technique as well as guide liner. Initially we tried a 3.0 by 26 and then a shorter stent. We also tried to deliver a 2.25 stent. There appears to be calcification where the previous stent was deployed. Were unable to cross the midportion of the vessel with anything other than a balloon. We're unable to pass a scoring balloon as well.     Assessment and Plan:     Brief HPI:     Active Diagnoses:    Diagnosis Date Noted POA    PRINCIPAL PROBLEM:  NSTEMI (non-ST elevated myocardial infarction) [I21.4] 03/07/2017 Yes    Renovascular hypertension [I15.0] 03/07/2017 Yes    Personal history of noncompliance with medical treatment, presenting hazards to health [Z91.19] 03/07/2017 Not Applicable    ESRD on hemodialysis [N18.6, Z99.2]  Not Applicable    Volume overload [E87.70] 08/29/2015 Yes    Mitral valve replaced [Z95.2] 03/26/2015 Not Applicable    Hyperlipidemia LDL goal <70 [E78.5] 09/18/2014 Yes    Thyroid disease [E07.9] 09/18/2014 Yes    DM (diabetes mellitus) type II controlled with renal  manifestation [E11.29] 06/02/2014 Yes    CAD (coronary artery disease) [I25.10] 05/08/2014 Yes    Cerebrovascular disease [I67.9] 05/08/2014 Yes      Problems Resolved During this Admission:    Diagnosis Date Noted Date Resolved POA       VTE Risk Mitigation         Ordered     Medium Risk of VTE  Once      03/07/17 2231     Reason for No Pharmacological VTE Prophylaxis  Once      03/07/17 2231        #Non-STEMI- s/p PTCA unable to deliver stent   #End-stage renal disease  #History of mitral valve replacement  #Type 2 diabetes  #Hypertension  #Dyslipidemia    Rec:  -jules macias dc and f/u primary cardiologist    Rishi De La Rosa MD  Cardiology  [facility]

## 2017-03-09 NOTE — NURSING
Pt alert, BP high MD Bullock ordered to hold nitro drip until after dialysis, notified of BP remaning in 160's at 0630 PRN hydralazin ordered. Pt went to cath lab right groin procedure angio done see note, leg kept straight for 2 hours. Dialysis called at 1630, again at 1730 and again at 1830 stated dialysis nurse on way caught in traffic. All other vitals stable and safety maintained throughout shift.

## 2017-03-09 NOTE — PROGRESS NOTES
Per report pt transferred from [facility] during the night. Hx of noncompliance, last dialysis x 1 week ago. Elevated troponin, r/o NSTEMI, plan is for pt to undergo a left heart cardiac catheterization today ASAP, then following will undergo hemodialysis today. Calls placed to icu for notification of completion of LHC.

## 2017-03-09 NOTE — PLAN OF CARE
Problem: Patient Care Overview  Goal: Plan of Care Review  Outcome: Ongoing (interventions implemented as appropriate)  Free from fall/trauma/pressure ulcer, cardiac cath and HD yesterday (3/8/2017), AAOx4, tolerating PO, no drips

## 2017-03-09 NOTE — PLAN OF CARE
Problem: Hemodialysis (Adult)  Goal: Signs and Symptoms of Listed Potential Problems Will be Absent, Minimized or Managed (Hemodialysis)  Signs and symptoms of listed potential problems will be absent, minimized or managed by discharge/transition of care (reference Hemodialysis (Adult) CPG).   Outcome: Ongoing (interventions implemented as appropriate)    03/08/17 2130   Hemodialysis   Problems Assessed (Hemodialysis) all   Problems Present (Hemodialysis) cardiovascular complications;electrolyte imbalance;fluid imbalance;hematologic complications;situational response   4 hour hd tx in progress

## 2017-03-09 NOTE — BRIEF OP NOTE
[facility]  Brief Operative Note    SUMMARY     Surgery Date: 3/8/2017     Surgeon(s) and Role:     * Rishi De La Rosa MD - Primary    Assisting Surgeon: None    Pre-op Diagnosis:  NSTEMI (non-ST elevated myocardial infarction) [I21.4]    Post-op Diagnosis:  NSTEMI  Procedure(s) (LRB):  HEART CATH-LEFT (Left)    Anesthesia: Choice    Description of Procedure: Left heart catheterization with selective coronary angiography, LV gram, PTCA of the RCA and failure to deliver stent    Description of the findings of the procedure: Diffuse in-stent restenosis of RCA stents and distal 80% lesion past the stents in the midportion of RCA.  Initially angioplasty was performed using a 3.0 and 3.5 mm noncompliant balloons distally initially and then pulled back to the proximal portion of the stents.  Multiple attempts to deliver stent with a R2 guide with ty wire technique as well as guide liner.  Initially we tried a 3.0 by 26 and then a shorter stent.  We also tried to deliver a 2.25 stent.  There appears to be calcification where the previous stent was deployed.  Were unable to cross the midportion of the vessel with anything other than a balloon.  We're unable to pass a scoring balloon as well.    Recommendation:  -Continue medical therapy    Estimated Blood Loss: 40 mL         Specimens:   Specimen     None

## 2017-03-09 NOTE — PROGRESS NOTES
Report rec'd from ICU RN, pt assessed, NAD noted. Speaking in full and complete sentences. Denies any SOB at present. Orders/labs/consent reviewed and confirmed, pt agreed to allow hemodialysis treatment today.

## 2017-03-09 NOTE — NURSING
"Discharge and instructions reviewed with patient. Medications given prior to discharge. Vitals WNL, patient on room air stats 100% (weaned from 3L) States he wears oxygen at home "as needed." AVS reviewed with and given to patient including medication instructions and pharmacy for prescriptions. Patient states he called his ride and is waiting for them to come.   "

## 2017-03-09 NOTE — PROGRESS NOTES
[facility]    WRITTEN HEALTHCARE and DISCHARGE INFORMATION   FROM YOUR CARE MANAGER  Follow-up Information     Follow up with Ronan Caldwell MD On 3/28/2017.    Specialty:  Family Medicine    Why:  9:30 am arrive 15 minutes early, bring ALL your medications, your written discharge information, your insuranace card and picture ID    Contact information:    [address]  [address]  [place] [address]  [phone]          Follow up with Nasim Torres MD On 3/14/2017.    Specialty:  Cardiology    Why:  1:40  pm Tuesday you will see Minesh Hayes for Dr Torres--arrive 15 minutes early, bring all your medications, your discharge instructions,  your insurance cards and picture ID    Contact information:    [address]  [address]  [place] [address]  [phone]          Follow up with [facility] On 3/10/2017.    Why:  return to your regular schedule    Contact information:    [phone]        Per Dr Torres office--this doctor is only in [place] on Wednesday's and is not available there in 1 week.Nurse obtained your appointment in [place] (per our discussion) to accomodate your  [place] [employer] schedule.      [facility] On Call Nurse Care Line - 24/7 Assistance  Registered [facility] nurses can provide appointment booking, health education, clinical advisement, and other advisory services.   Call for this free service at [phone].    Thank you for choosing [facility] for your care.  Within 48-72 hours after leaving the hospital you will receive a call from [facility] Nurses following up to see how you are doing. The team will ask you a few questions and the call will last approximately 20 minutes.     Please answer any calls you may receive from [facility] we want to continue to support you as you manage your healthcare needs. [facility] is happy to have the opportunity to serve you.     Sincerely,  Your [facility] Team,   THANK YOU FOR LETTING  ME ASSIST WITH YOUR DISCHARGE PLANNING,     Kasey De León [facility]   [place]  [phone]

## 2017-03-09 NOTE — PROGRESS NOTES
S/p LHC and HD yesterday  Feels better    PE: no change  Labs reviewed    1. ESRD  2. NSTEMI  3. Anemia of CKD  4. Volume Overload    Pt instructed to go to HD tomorrow  Note sent to his nephrologist

## 2017-03-10 NOTE — PLAN OF CARE
03/10/17 0712   Final Note   Assessment Type Final Discharge Note   Discharge Disposition Home   Discharge planning education complete? Yes   Hospital Follow Up  Appt(s) scheduled? Yes   Discharge plans and expectations educations in teach back method with documentation complete? Yes   Offered [facility] Bed[facility] Delivery? n/a   Discharge/Hospital Encounter Summary to (non-[facility]) PCP n/a   Referral to Outpatient Case Management complete? n/a   Referral to / orders for Home Health Complete? n/a   30 day supply of medicines given at discharge, if documented non-compliance / non-adherence? n/a   Any social issues identified prior to discharge? No   Did you assess the readiness or willingness of the family or caregiver to support self management of care? No

## 2017-03-14 NOTE — PROGRESS NOTES
Subjective:       Patient ID: Williams Bland is a 67 y.o. male who presents for follow up of Coronary Artery Disease; Hypertension; Hyperlipidemia; ESRD; and Valvular Heart Disease.    Chief Complaint: No chief complaint on file.    HPI Comments: Williams Bland is a 67 y.o. male who presents today for hospital follow up. PMH significant for CHF; CAD;  CVA (2013); DM; ESRD; HTN, HLD. Recently hospitalized after missing 2 HD sessions due to complaints of pain. He was trying to get in to see his pain management physician. Patient was admitted for non-ST elevation MI and volume overload from noncompliance with hemodialysis.  Patient underwent angiogram at [facility] [place]-   ACS Enzymes:   03/07/2017 CKMB1  5.1, CPK  349, TROP  1.210   03/08/2017 TROP  3.552   03/08/2017 TROP  4.035    Ejection Fraction: 38%  LVEDP: 14   LM is normal.   LAD is diffusely diseased.   ostial LCX has a 70% stenosis. Remaining portion of the vessel is diffusely diseased.  proximal RCA has a 80% stenosis within the stent.   mid RCA has a 80% stenosis within the stent.  Intervention:        Proximal RCA:              The lesion was successfully intervened. Post-stenosis of 20% and post-NELLIE 3 flow.  Blln Nc Euphora 3.5 X 15mm and Blln Emerge 3.0 X15.       Mid RCA:              The lesion was successfully intervened. Post-stenosis of 20% and post-NELLIE 3 flow.  Blln Nc Euphora 3.5 X 15mm, Blln Emerge 3.0 X15, Stent Resolute 3.0x26 (JANI), Stent Resolute   3.0x18 (JANI), Stent Resolute 2.25x12 (JANI) and Blln Angiosculpt 2.5x10.       RCA:              The lesion was successfully intervened. Post-stenosis of 20% and post-NELLIE 3 flow.  Blln Emerge 3.5 X15, Blln Sprinter Otw 2.0 X 10 and Stent Resolute 3.0x26 (JANI).  Per Dr Rishi De La Rosa MD  EF 35% per echo responds well to Lasix.  CAD medically managed with ACEI, BB, Statin, asa, and plavix as well as Imdur. Patient has not had recurrence of chest pain. Reports some SOB before HD on Monday which resolved with  volume removal. Reports compliance with medications and diet. Eats a lot of fresh vegetables from his garden. Also reports compliance with HD since DC. Since patient was last seen in cardiology clinic her has undergone fem-distal bypass and amputation a part of his foot. He had extensive gangrene. It was felt that we could not perform a transmetatarsal amputation due to the   large amount of tissue loss despite the bypass being patent. Therefore, below-knee amputation performed on the right. He has a prosthesis and is ambulatory with a rolling walker.     Review of Systems   Constitutional: Negative for diaphoresis.   HENT: Negative.    Eyes: Negative.    Respiratory: Negative for cough, chest tightness, shortness of breath and wheezing.    Cardiovascular: Negative for chest pain, palpitations and leg swelling.   Gastrointestinal: Negative.    Endocrine: Negative.    Genitourinary: Negative.    Musculoskeletal: Positive for arthralgias and gait problem.   Skin: Negative.    Allergic/Immunologic: Negative.    Hematological: Negative.    Psychiatric/Behavioral: Negative.        Objective:      Physical Exam   Constitutional: He is oriented to person, place, and time. He appears well-developed and well-nourished. No distress.   HENT:   Head: Normocephalic and atraumatic.   Eyes: Right eye exhibits no discharge. Left eye exhibits no discharge.   Neck: No JVD present.   Cardiovascular: Normal rate and regular rhythm.    Murmur heard.  Pulmonary/Chest: Effort normal and breath sounds normal.   Abdominal: Soft. Bowel sounds are normal.   Musculoskeletal: He exhibits no edema.   Neurological: He is alert and oriented to person, place, and time.   Skin: Skin is warm and dry. He is not diaphoretic.   Psychiatric: He has a normal mood and affect. His behavior is normal. Judgment and thought content normal.       Assessment:       1. Coronary artery disease involving coronary bypass graft of native heart without angina pectoris     2. Essential hypertension        Plan:       Diagnoses and all orders for this visit:    Coronary artery disease involving coronary bypass graft of native heart without angina pectoris  -     carvedilol (COREG) 25 MG tablet; Take 1 tablet (25 mg total) by mouth 2 (two) times daily with meals.    Essential hypertension  -     carvedilol (COREG) 25 MG tablet; Take 1 tablet (25 mg total) by mouth 2 (two) times daily with meals.  Unsure if patient taking Coreg- noted as  medication. Refilled to begin today  BP elevated 170/88 right arm sitting; Left no BP performed 2/2 HD access  If BP continues to be elevated, anticipate up-titration of ACEI  Continue medical management of CAD   Continue cardiac, renal, ADA diet  Increase activity as tolerated  RTC in 1 week to establish care in HF clinic in setting of recent ADHF      Current Cardiac Outpatient Prescriptions:     amlodipine (NORVASC) 10 MG tablet, Take 1 tablet (10 mg total) by mouth once daily., Disp: 30 tablet, Rfl: 0    aspirin (ECOTRIN) 81 MG EC tablet, Take 1 tablet (81 mg total) by mouth once daily., Disp: , Rfl: 0    atorvastatin (LIPITOR) 40 MG tablet, Take 1 tablet (40 mg total) by mouth once daily., Disp: 30 tablet, Rfl: 11    carvedilol (COREG) 25 MG tablet, Take 1 tablet (25 mg total) by mouth 2 (two) times daily with meals., Disp: 60 tablet, Rfl: 11    clopidogrel (PLAVIX) 75 mg tablet, Take 1 tablet (75 mg total) by mouth once daily., Disp: 30 tablet, Rfl: 0    furosemide (LASIX) 80 MG tablet, Take 1 tablet (80 mg total) by mouth once daily., Disp: 90 tablet, Rfl: 3    isosorbide mononitrate (IMDUR) 60 MG 24 hr tablet, Take 1 tablet (60 mg total) by mouth once daily., Disp: 30 tablet, Rfl: 3    lisinopril (PRINIVIL,ZESTRIL) 20 MG tablet, Take 1 tablet (20 mg total) by mouth once daily., Disp: 30 tablet, Rfl: 0

## 2017-03-24 NOTE — TELEPHONE ENCOUNTER
----- Message from NITESH Comer ANP sent at 3/23/2017  9:37 PM CDT -----  Contact: Danae  Patient seen by Minesh last week and was to see me this week but cancelled/no show. He is on HD 3 times per week and lives in [place]. It may be easier for him to make appointments in [place] with Dr. David. Can you please call him and arrange follow up for him? If he is able to come to [facility] CHF clinic on Wednesdays that is fine with me as well                               Thanks Danae

## 2017-03-28 NOTE — TELEPHONE ENCOUNTER
----- Message from NITESH Comer ANP sent at 3/28/2017  3:59 PM CDT -----  Contact: Diary/caregiver/[phone]      ----- Message -----     From: Rachel Velazquez     Sent: 3/28/2017   1:10 PM       To: NITESH Comer ANP    He was scheduled on 3/22/17 for a fu; he cannot do Wednesdays because he go to dialysis on Mondays,Wednesdays, and Fridays.  He needs to reschedule this appointment on a Tuesday or Thursday.

## 2017-04-19 PROBLEM — R73.9 HYPERGLYCEMIA WITHOUT KETOSIS: Status: ACTIVE | Noted: 2017-01-01

## 2017-04-19 PROBLEM — I27.20 PULMONARY HYPERTENSION: Status: ACTIVE | Noted: 2017-01-01

## 2017-04-19 PROBLEM — I16.1 HYPERTENSIVE EMERGENCY: Status: ACTIVE | Noted: 2017-01-01

## 2017-04-19 PROBLEM — I15.0 RENOVASCULAR HYPERTENSION: Status: RESOLVED | Noted: 2017-01-01 | Resolved: 2017-01-01

## 2017-04-19 PROBLEM — D69.6 THROMBOCYTOPENIA: Status: ACTIVE | Noted: 2017-01-01

## 2017-04-19 PROBLEM — E87.70 FLUID OVERLOAD: Status: ACTIVE | Noted: 2017-01-01

## 2017-04-19 PROBLEM — I50.23 ACUTE ON CHRONIC SYSTOLIC HEART FAILURE: Status: ACTIVE | Noted: 2017-01-01

## 2017-04-19 NOTE — PLAN OF CARE
Problem: Patient Care Overview  Goal: Plan of Care Review  Outcome: Ongoing (interventions implemented as appropriate)  Pt's SpO2 98% on 2 lpm NC. No respiratory distress noted. Will continue to monitor SpO2.

## 2017-04-19 NOTE — PLAN OF CARE
Report called to 5th floor RN. Belongings taken to room 528. Patient still in dialysis at this time.

## 2017-04-19 NOTE — PLAN OF CARE
Pt taken by transport to dialysis unit. We are awaiting transfer to the floor, but have not received the room assignment at this time. Primary team notified of patient's location.

## 2017-04-19 NOTE — PLAN OF CARE
04/19/17 1006   Discharge Assessment   Assessment Type Discharge Planning Assessment   Confirmed/corrected address and phone number on facesheet? Yes   Assessment information obtained from? Patient;Medical Record   Communicated expected length of stay with patient/caregiver yes   Prior to hospitilization cognitive status: Alert/Oriented   Prior to hospitalization functional status: Assistive Equipment   Current cognitive status: (letahrgis, but easily arousable to answer questions)   Current Functional Status: Needs Assistance;Assistive Equipment   Arrived From acute care hospital   Lives With spouse   Able to Return to Prior Arrangements yes   Is patient able to care for self after discharge? Yes   How many people do you have in your home that can help with your care after discharge? 1   Who are your caregiver(s) and their phone number(s)? Robyn (spouse) [phone]   Patient's perception of discharge disposition home or selfcare   Readmission Within The Last 30 Days no previous admission in last 30 days   Patient currently being followed by outpatient case management? No   Patient currently receives home health services? No   Does the patient currently use HME? Yes   Name and contact number for HME provider: does not recall   Patient currently receives private duty nursing? No   Patient currently receives any other outside agency services? No   Equipment Currently Used at Home rollator;glucometer;prosthesis;oxygen  (Right leg prosthesis)   Do you have any problems affording any of your prescribed medications? No   Is the patient taking medications as prescribed? yes   Do you have any financial concerns preventing you from receiving the healthcare you need? No   Does the patient have transportation to healthcare appointments? Yes   Transportation Available family or friend will provide   On Dialysis? Yes   If yes, what is the name of the dialysis unit? [facility] m-w-f    Does the patient receive  outpatient dialysis? Yes   Does the patient receive services at the Coumadin Clinic? No   Are there any open cases? No   Discharge Plan A Home with family   Discharge Plan B Home with family;Home Health   Patient/Family In Agreement With Plan yes

## 2017-04-19 NOTE — PLAN OF CARE
Problem: Hemodialysis (Adult)  Goal: Signs and Symptoms of Listed Potential Problems Will be Absent, Minimized or Managed (Hemodialysis)  Signs and symptoms of listed potential problems will be absent, minimized or managed by discharge/transition of care (reference Hemodialysis (Adult) CPG).  Outcome: Ongoing (interventions implemented as appropriate)    04/19/17 1544   Hemodialysis   Problems Assessed (Hemodialysis) fluid imbalance;electrolyte imbalance   Problems Present (Hemodialysis) electrolyte imbalance;fluid imbalance   Hd with uf

## 2017-04-19 NOTE — NURSING
Dr. Ocampo notified pt has been having a rapid decrease in POC glucose levels. Ok to decrease rate on insulin by half again at .4375 units per hour. Did not want to start any IV fluids since pt is in fluid overload. Ok to give juice at this time. No acute events overnight. Blood pressure improved after PO medications given. NSR with HR 60's-70's. SATS 100% on 2L per nasal cannula. No c/o pain. Will continue to monitor.

## 2017-04-19 NOTE — NURSING
"0121: Pt arrived to unit via ambulance awake, alert, and oriented. No signs of distress noted. Respirations even and unlabored. Denies pain at this time. Reports feeling "short winded". 2L per nasal cannula applied. SATS 99%. Blood pressure 214/93. POC glucose greater than 500. MD notifed of pt's arrival and stated he would be right up to assess patient. Pt oriented to room. Will continue to monitor.     0130: Dr. Ocampo and Dr. Dykes at bedside. Aware of glucose and blood pressure. Will put in orders. Security called to bedside to lock up patient's money and medications.       "

## 2017-04-19 NOTE — H&P
"[facility] Internal Medicine History and Physical - Resident Note    Admitting Team: [facility] Internal Medicine Team A  Attending Physician: Dr. Acharya  Resident: Jania  Interns: [place]    Date of Admit: 4/19/2017    Chief Complaint and Duration     SOB x 1 day    Subjective:      History of Present Illness:  Williams Bland is a 67 y.o. male who  has a past medical history of CHF (congestive heart failure); Coronary artery disease; CVA (cerebral vascular accident) (2013); Diabetes mellitus; ESRD (end stage renal disease); Hypertension; and Stroke. The patient presented to [facility] on 4/19/2017 with a primary complaint of SOB for 1 day.    Patient is a poor historian. Patient is a transfeThe patient was in his USOH, which means he is on home O2 2L, s/p R BKA, walks with walker, until early today he had some fried fish after which he started feeling SOB. He denies any LE edema, CP, pleuritic CP, fevers, chills, dysuria, diarrhea. Patient reports that his diet is "not good" and measures his glucose regular in the 200s. He does not measure BP at home.      Medical History:  Past Medical History:   Diagnosis Date    CHF (congestive heart failure)     Coronary artery disease     CVA (cerebral vascular accident) 2013    Diabetes mellitus     ESRD (end stage renal disease)     Hypertension     Stroke        Past Surgical History:  Past Surgical History:   Procedure Laterality Date    CARDIAC SURGERY      PTCA WITH STENT PLACEMENT    THYROID SURGERY         Allergies:  Review of patient's allergies indicates:  No Known Allergies    Home Medications:  Prior to Admission medications    Medication Sig Start Date End Date Taking? Authorizing Provider   albuterol sulfate 2.5 mg/0.5 mL Nebu Take 2.5 mg by nebulization every 8 (eight) hours. 1/7/15 1/7/16  NITESH Comer, ANP   allopurinol (ZYLOPRIM) 100 MG tablet Take 100 mg by mouth every morning.     Historical Provider, MD   amlodipine (NORVASC) 10 " MG tablet Take 1 tablet (10 mg total) by mouth once daily. 3/9/17 4/8/17  Manuel Greenwood MD   aspirin (ECOTRIN) 81 MG EC tablet Take 1 tablet (81 mg total) by mouth once daily. 3/9/17 3/9/18  Manuel Greenwood MD   atorvastatin (LIPITOR) 40 MG tablet Take 1 tablet (40 mg total) by mouth once daily. 8/4/15   Nasim Torres MD   blood sugar diagnostic (CONTOUR TEST STRIPS) Strp USE TO CHECK BLOOD SUGAR THREE TIMES A DAY. 9/8/14   Historical Provider, MD   carvedilol (COREG) 25 MG tablet Take 1 tablet (25 mg total) by mouth 2 (two) times daily with meals. 3/14/17 4/13/17  Minesh Lenz NP   clopidogrel (PLAVIX) 75 mg tablet Take 1 tablet (75 mg total) by mouth once daily. 3/9/17 4/8/17  Manuel Greenwood MD   collagenase ointment Apply topically once daily. 7/23/15   NITESH Comer, ANP   furosemide (LASIX) 80 MG tablet Take 1 tablet (80 mg total) by mouth once daily. 9/2/15   Francisco Trinidad MD   glimepiride (AMARYL) 2 MG tablet Take 2 mg by mouth daily with breakfast.    Historical Provider, MD   hydrocodone-acetaminophen 5-325mg (NORCO) 5-325 mg per tablet Take 1 tablet by mouth every 6 (six) hours as needed for Pain. 8/20/15   Nasim Torres MD   insulin detemir (LEVEMIR FLEXTOUCH) 100 unit/mL (3 mL) SubQ InPn pen Inject 5 Units into the skin. 8/14/15   Historical Provider, MD   isosorbide mononitrate (IMDUR) 60 MG 24 hr tablet Take 1 tablet (60 mg total) by mouth once daily. 2/9/17   Nasim Torres MD   levothyroxine (SYNTHROID) 125 MCG tablet Take 125 mcg by mouth once daily.    Historical Provider, MD   lisinopril (PRINIVIL,ZESTRIL) 20 MG tablet Take 1 tablet (20 mg total) by mouth once daily. 3/9/17 4/8/17  Manuel Greenwood MD   lubiprostone (AMITIZA) 8 MCG Cap Take 8 mcg by mouth 2 (two) times daily.    Historical Provider, MD   miconazole (MICOTIN) 2 % cream Apply topically 2 (two) times daily. 1/7/15   NITESH Comer, ANP   pantoprazole (PROTONIX) 40 MG tablet  "Take 1 tablet by mouth daily. 16   NITESH Comer, ANP   protein (ENSURE HIGH PROTEIN) Powd Take by mouth 3 (three) times daily. 3 teaspoons PO TID    Historical Provider, MD   sevelamer carbonate (RENVELA) 800 mg Tab Take 1 tablet (800 mg total) by mouth 3 (three) times daily with meals. 16  SHAKEEL Cerrato   silver sulfADIAZINE 1% (SILVADENE) 1 % cream Apply 1 application topically 2 (two) times daily. 7/16/15   Nasim Torres MD   trazodone (DESYREL) 100 MG tablet Take 1 tablet (100 mg total) by mouth every evening. 10/27/16   Nasim Torres MD       Family History:  Family History   Problem Relation Age of Onset    Hypertension Father        Social History:  Social History   Substance Use Topics    Smoking status: Former Smoker     Quit date: 2000    Smokeless tobacco: Never Used    Alcohol use No       Review of Systems:  Pertinent items are noted in HPI.    Health Maintaince:   Primary Care Physician: Ronan Caldwell MD  Immunizations:   Currently on File:   Most Recent Immunizations   Administered Date(s) Administered    PPD Test 2015     TDap is up to date. Patient reports.  Influenza is up to date.  Pneumovax is up to date.  Cancer Screening:  Colonoscopy: is not up to date.    Objective:     Last 24 Hour Vital Signs:  Height  Av' 11" (180.3 cm)  Min: 5' 11" (180.3 cm)  Max: 5' 11" (180.3 cm)  Weight  Av.8 kg (169 lb 5 oz)  Min: 76.8 kg (169 lb 5 oz)  Max: 76.8 kg (169 lb 5 oz)  Body mass index is 23.61 kg/(m^2).       Physical Examination:  Triage Vitals:  ED Triage Vitals   Enc Vitals Group      BP --       Pulse --       Resp --       Temp --       Temp src --       SpO2 --       Weight --       Height --       Head Cir --       Peak Flow --       Pain Score --       Pain Loc --       Pain Edu? --       Excl. in GC? --        Admit Vitals:  Ht 5' 11" (1.803 m)  Wt 76.8 kg (169 lb 5 oz)  BMI 23.61 kg/m2   /93, Temp 99F, RR 20, HR 72, 99% " on 2L NC    General: alert, cooperative, and no acute distress, on NC  Head: Normocephalic, without obvious abnormality, atraumatic  Eyes: conjunctivae/corneas clear; PERRL, EOM's intact  Nose: Nares normal; septum midline; mucosa normal; no drainage or sinus tenderness  Throat: lips, mucosa, and tongue normal; teeth and gums normal  Neck: no adenopathy, unable to appreciate JVP, supple, symmetrical, trachea midline and thyroid not enlarged, symmetric, no tenderness/mass/nodules  Lung: mild crackles to lung bases R>L; no increased work of breathing  Heart: regular rate and rhythm, no M/R/G, normal S1/S2.  Abdomen: soft, non-tender; bowel sounds normal; ventral hernia  Extremities: LUE fistula with bruit, RLE s/p BKA, LLE with mild pitting edema, no cyanosis   Pulses: 2+ and symmetric  Skin: Skin color, texture, turgor normal; no rashes or lesions  Neuro: A&Ox3, 5/5 motor strength b/l, normal sensation to light touch b/l   Psych: normal affect; no SI/HI      Laboratory:  Most Recent Data:  CBC:  Lab Results   Component Value Date    WBC 9.27 03/07/2017    RBC 4.23 (L) 03/07/2017    HGB 10.8 (L) 03/07/2017    HCT 32.9 (L) 03/07/2017    MCV 78 (L) 03/07/2017    MCH 25.5 (L) 03/07/2017    MCHC 32.8 03/07/2017    RDW 15.5 (H) 03/07/2017    PLT 96 (L) 03/07/2017    MPV SEE COMMENT 03/07/2017    GRAN 7.7 03/07/2017    GRAN 82.9 (H) 03/07/2017    LYMPH 0.9 (L) 03/07/2017    LYMPH 9.9 (L) 03/07/2017    MONO 0.5 03/07/2017    MONO 5.5 03/07/2017    EOS 0.2 03/07/2017    BASO 0.01 03/07/2017    EOSINOPHIL 1.9 03/07/2017    BASOPHIL 0.1 03/07/2017     No additional cells seen    BMP:   Lab Results   Component Value Date     (L) 03/09/2017    K 4.0 03/09/2017     03/09/2017    CO2 17 (L) 03/09/2017    BUN 33 (H) 03/09/2017    CREATININE 3.0 (H) 03/09/2017     (H) 03/09/2017    CALCIUM 8.7 03/09/2017    MG 1.8 03/09/2017    PHOS 4.3 03/08/2017     LFTs:   Lab Results   Component Value Date    PROT 6.4  03/09/2017    ALBUMIN 3.0 (L) 03/09/2017    BILITOT 0.9 03/09/2017    AST 18 03/09/2017    ALKPHOS 97 03/09/2017    ALT 21 03/09/2017     Coags:   Lab Results   Component Value Date    INR 1.1 03/07/2017     FLP:   Lab Results   Component Value Date    CHOL 79 (L) 03/07/2017    HDL 41 03/07/2017    LDLCALC 28.6 (L) 03/07/2017    TRIG 47 03/07/2017    CHOLHDL 51.9 (H) 03/07/2017     DM:   Lab Results   Component Value Date    HGBA1C 14.7 (H) 03/08/2017    HGBA1C 14.5 (H) 03/07/2017    HGBA1C 10.6 (H) 03/14/2016    LDLCALC 28.6 (L) 03/07/2017    CREATININE 3.0 (H) 03/09/2017     Thyroid:   Lab Results   Component Value Date    TSH 0.308 (L) 03/08/2017    FREET4 0.86 03/08/2017    T8WARLG 3.3 (L) 12/02/2014    S9ORPIM 112 07/25/2006     Anemia:   Lab Results   Component Value Date    IRON 41 (L) 04/07/2015    TIBC 201 (L) 04/07/2015    FERRITIN 959 (H) 04/07/2015     Cardiac:   Lab Results   Component Value Date    TROPONINI 4.035 (H) 03/08/2017    CKTOTAL 618 (H) 11/26/2014    CKMB 2.1 11/26/2014     (H) 08/18/2015     Urinalysis:   Lab Results   Component Value Date    LABURIN No growth 11/28/2014    COLORU Yellow 03/11/2016    SPECGRAV 1.025 03/11/2016    NITRITE Negative 03/11/2016    KETONESU Negative 03/11/2016    UROBILINOGEN Negative 03/11/2016    WBCUA 1 03/11/2016       Trended Lab Data:  No results for input(s): WBC, HGB, HCT, PLT, MCV, RDW, NA, K, CL, CO2, BUN, CREATININE, GLU, PROT, ALBUMIN, BILITOT, AST, ALKPHOS, ALT in the last 168 hours.    Trended Cardiac Data:  No results for input(s): TROPONINI, CKTOTAL, CKMB, BNP in the last 168 hours.    Microbiology Data:  None    Other Laboratory Data:  None    Other Results:  EKG (my interpretation): EKG at [facility], no acute ischemia     Radiology:  Imaging Results     None           Assessment:     Williams Bland is a 67 y.o. male with:    Plan:     Hyperglycemia/Uncontrolled DMII with neuropathy   - uncontrolled DM  3/2017 A1C 14.7  likely  non-compliance/inappropriate regimen (basal insulin 5U)  -  at [facility], normal AG  pending VBG  - s/p 7U insulin subQ at [facility]  will start insulin gtt, monitor with CMP    Shortness of Breath   - reported SOB after eating fried fish  [place] [facility] and [place] [facility] [place] Exam with minimal basilar crackles  BNP 3058   - CXR at [facility] with b/l infiltrates/CHF per ED physician read  repeat CXR  - s/p Lasix 120mg IV at [facility]  will obtain VBG    ESRD on HD (MWF)  - patient reports follows Dr. Alvarez  - consulted [facility]     Thrombocytopenia   - Plt 96 since 3/2017  Plt 126 on admit  - no signs of bleeding  will monitor     CAD s/p CABG/stents   - 3/2017 [facility] with LCX 70% stenosis, prox/mid RCA 80% stenosis   - continue ASA/atorvastatin/Plavix/Coreg/Imdur     Chronic Systolic Heart Failure  - 3/2017 ECHO EF 35%, mod-severe TR, pulm HTN 88  - s/p Lasix 120mg IV  Home meds Lasix 80mg TThSatSun  - will hold lasix for possible HD tomorrow    H/o CVA   - patient reports residual hand  weakness  unremarkable on exam  - Continue ASA/atrovastatin    HTN  -  on admit  continue Norvasc/Imdur/Lisinopril/Coreg    HLD  - 3/2017 lipid panel: chol 79, LDL 28.6, TG 47   - continue atorvastatin    Normocytic Anemia   - 2015 Fe study c/w Anemia of chronic disease  - will repeat Fe study    Hypothyroidism  - continue synthroid     F:   E:   N: CLD  PPx: heparin  Dispo: pending clinical improvement    Code Status: Full    Fabricio Ocampo  [facility] Internal Medicine HO-I    [facility] Medicine Hospitalist Pager Numbers:   [facility] Hospitalist Medicine Team A (Krista/Patrizia): [phone]  [facility] Hospitalist Medicine Team B (Ivette/Kathi):  [phone]

## 2017-04-19 NOTE — PROGRESS NOTES
Results for RIANNA PENG (MRN [id number]) as of 4/19/2017 07:06   Ref. Range 4/19/2017 06:00   POC PH Latest Ref Range: 7.35 - 7.45  7.399   POC PCO2 Latest Ref Range: 35 - 45 mmHg 45.9 (H)   POC PO2 Latest Ref Range: 40 - 60 mmHg 53   POC BE Latest Ref Range: -2 to 2 mmol/L 4   POC HCO3 Latest Ref Range: 24 - 28 mmol/L 28.3 (H)   POC SATURATED O2 Latest Ref Range: 95 - 100 % 86 (L)   POC TCO2 Latest Ref Range: 24 - 29 mmol/L 30 (H)   Sample Unknown VENOUS   DelSys Unknown Room Air   Allens Test Unknown N/A   Site Unknown Other     Results communicated with nurse.

## 2017-04-19 NOTE — PROGRESS NOTES
After reviewing previous assessment, I agree with the finding. Pt given discharge instructions with AVS packet. Pt given prescriptions. Teach back method used for medications and daily weights. Pt waiting on wife to be discharged.

## 2017-04-19 NOTE — IP AVS SNAPSHOT
[facility]  [address]  [place] [address]  Phone: [phone]           Patient Discharge Instructions   Our goal is to set you up for success. This packet includes information on your condition, medications, and your home care.  It will help you care for yourself to prevent having to return to the hospital.     Please ask your nurse if you have any questions.      There are many details to remember when preparing to leave the hospital. Here is what you will need to do:    1. Take your medicine. If you are prescribed medications, review your Medication List on the following pages. You may have new medications to  at the pharmacy and others that you'll need to stop taking. Review the instructions for how and when to take your medications. Talk with your doctor or nurses if you are unsure of what to do.     2. Go to your follow-up appointments. Specific follow-up information is listed in the following pages. Your may be contacted by a nurse or clinical provider about future appointments. Be sure we have all of the phone numbers to reach you. Please contact your provider's office if you are unable to make an appointment.     3. Watch for warning signs. Your doctor or nurse will give you detailed warning signs to watch for and when to call for assistance. These instructions may also include educational information about your condition. If you experience any of warning signs to your health, call your doctor.               ** Verify the list of medication(s) below is accurate and up to date. Carry this with you in case of emergency. If your medications have changed, please notify your healthcare provider.             Medication List      START taking these medications        Additional Info                      insulin aspart 100 unit/mL Inpn pen   Commonly known as:  NovoLOG   Quantity:  4.5 mL   Refills:  11   Dose:  5 Units    Last time this was given:  5 Units on 4/19/2017 11:34 AM    Instructions:  Inject 5 Units into the skin 3 (three) times daily with meals.     Begin Date    AM    Noon    PM    Bedtime         CHANGE how you take these medications        Additional Info                      amlodipine 10 MG tablet   Commonly known as:  NORVASC   Quantity:  30 tablet   Refills:  0   Dose:  10 mg   What changed:  how much to take    Last time this was given:  5 mg on 4/19/2017  3:04 AM   Instructions:  Take 1 tablet (10 mg total) by mouth once daily.     Begin Date    AM    Noon    PM    Bedtime       furosemide 80 MG tablet   Commonly known as:  LASIX   Quantity:  90 tablet   Refills:  3   Dose:  80 mg   Indications:  TEMPORARY DUE TO SWELLING (BLE)   What changed:    - when to take this  - additional instructions   Comments:  Hold diuretics and if weight gain up to 4 lbs then restart (per nephrology).  Weigh yourself daily.    Instructions:  Take 1 tablet (80 mg total) by mouth once daily.     Begin Date    AM    Noon    PM    Bedtime       insulin detemir 100 unit/mL (3 mL) Inpn pen   Commonly known as:  LEVEMIR FLEXTOUCH   Quantity:  3 mL   Refills:  11   Dose:  10 Units   What changed:    - how much to take  - when to take this    Last time this was given:  10 Units on 4/19/2017  7:35 AM   Instructions:  Inject 10 Units into the skin every evening.     Begin Date    AM    Noon    PM    Bedtime       sevelamer carbonate 800 mg Tab   Commonly known as:  RENVELA   Quantity:  90 tablet   Refills:  0   Dose:  800 mg   What changed:  how much to take    Last time this was given:  800 mg on 4/19/2017 11:34 AM   Instructions:  Take 1 tablet (800 mg total) by mouth 3 (three) times daily with meals.     Begin Date    AM    Noon    PM    Bedtime         CONTINUE taking these medications        Additional Info                      albuterol sulfate 2.5 mg/0.5 mL Nebu   Quantity:  225 mg   Refills:  11   Dose:  2.5 mg    Instructions:  Take 2.5 mg by nebulization every 8 (eight) hours.     Begin Date     AM    Noon    PM    Bedtime       allopurinol 100 MG tablet   Commonly known as:  ZYLOPRIM   Refills:  0   Dose:  100 mg    Instructions:  Take 100 mg by mouth every morning.     Begin Date    AM    Noon    PM    Bedtime       aspirin 81 MG EC tablet   Commonly known as:  ECOTRIN   Refills:  0   Dose:  81 mg    Last time this was given:  81 mg on 4/19/2017  7:38 AM   Instructions:  Take 1 tablet (81 mg total) by mouth once daily.     Begin Date    AM    Noon    PM    Bedtime       atorvastatin 40 MG tablet   Commonly known as:  LIPITOR   Quantity:  30 tablet   Refills:  11    Last time this was given:  40 mg on 4/19/2017  3:04 AM   Instructions:  Take 1 tablet (40 mg total) by mouth once daily.     Begin Date    AM    Noon    PM    Bedtime       carvedilol 25 MG tablet   Commonly known as:  COREG   Quantity:  60 tablet   Refills:  11   Dose:  25 mg    Last time this was given:  25 mg on 4/19/2017  3:04 AM   Instructions:  Take 1 tablet (25 mg total) by mouth 2 (two) times daily with meals.     Begin Date    AM    Noon    PM    Bedtime       clopidogrel 75 mg tablet   Commonly known as:  PLAVIX   Quantity:  30 tablet   Refills:  0   Dose:  75 mg    Last time this was given:  75 mg on 4/19/2017  7:38 AM   Instructions:  Take 1 tablet (75 mg total) by mouth once daily.     Begin Date    AM    Noon    PM    Bedtime       collagenase ointment   Quantity:  30 g   Refills:  0    Instructions:  Apply topically once daily.     Begin Date    AM    Noon    PM    Bedtime       CONTOUR TEST STRIPS Strp   Refills:  0   Generic drug:  blood sugar diagnostic    Instructions:  USE TO CHECK BLOOD SUGAR THREE TIMES A DAY.     Begin Date    AM    Noon    PM    Bedtime       ENSURE HIGH PROTEIN Powd   Refills:  0   Generic drug:  protein    Instructions:  Take by mouth 3 (three) times daily. 3 teaspoons PO TID     Begin Date    AM    Noon    PM    Bedtime       hydrocodone-acetaminophen 5-325mg 5-325 mg per tablet   Commonly known  as:  NORCO   Quantity:  40 tablet   Refills:  0   Dose:  1 tablet    Instructions:  Take 1 tablet by mouth every 6 (six) hours as needed for Pain.     Begin Date    AM    Noon    PM    Bedtime       isosorbide mononitrate 60 MG 24 hr tablet   Commonly known as:  IMDUR   Quantity:  30 tablet   Refills:  3   Dose:  60 mg    Last time this was given:  60 mg on 4/19/2017  3:04 AM   Instructions:  Take 1 tablet (60 mg total) by mouth once daily.     Begin Date    AM    Noon    PM    Bedtime       levothyroxine 125 MCG tablet   Commonly known as:  SYNTHROID   Refills:  0   Dose:  125 mcg    Last time this was given:  125 mcg on 4/19/2017  5:26 AM   Instructions:  Take 125 mcg by mouth once daily.     Begin Date    AM    Noon    PM    Bedtime       lisinopril 20 MG tablet   Commonly known as:  PRINIVIL,ZESTRIL   Quantity:  30 tablet   Refills:  0   Dose:  20 mg    Last time this was given:  20 mg on 4/19/2017  3:04 AM   Instructions:  Take 1 tablet (20 mg total) by mouth once daily.     Begin Date    AM    Noon    PM    Bedtime       lubiprostone 8 MCG Cap   Commonly known as:  AMITIZA   Refills:  0   Dose:  8 mcg    Instructions:  Take 8 mcg by mouth 2 (two) times daily.     Begin Date    AM    Noon    PM    Bedtime       miconazole 2 % cream   Commonly known as:  MICOTIN   Quantity:  92 g   Refills:  0    Instructions:  Apply topically 2 (two) times daily.     Begin Date    AM    Noon    PM    Bedtime       oxycodone-acetaminophen  mg per tablet   Commonly known as:  PERCOCET   Refills:  0   Dose:  1 tablet    Instructions:  Take 1 tablet by mouth every 6 (six) hours as needed for Pain.     Begin Date    AM    Noon    PM    Bedtime       pantoprazole 40 MG tablet   Commonly known as:  PROTONIX   Quantity:  30 tablet   Refills:  11    Last time this was given:  40 mg on 4/19/2017  7:38 AM   Instructions:  Take 1 tablet by mouth daily.     Begin Date    AM    Noon    PM    Bedtime       pregabalin 75 MG capsule    Commonly known as:  LYRICA   Refills:  0   Dose:  75 mg    Instructions:  Take 75 mg by mouth 2 (two) times daily. Take before and after dialysis     Begin Date    AM    Noon    PM    Bedtime       silver sulfADIAZINE 1% 1 % cream   Commonly known as:  SILVADENE   Quantity:  1 Bottle   Refills:  11   Dose:  1 application    Instructions:  Apply 1 application topically 2 (two) times daily.     Begin Date    AM    Noon    PM    Bedtime       trazodone 100 MG tablet   Commonly known as:  DESYREL   Quantity:  30 tablet   Refills:  11    Instructions:  Take 1 tablet (100 mg total) by mouth every evening.     Begin Date    AM    Noon    PM    Bedtime         STOP taking these medications     glimepiride 2 MG tablet   Commonly known as:  AMARYL            Where to Get Your Medications      You can get these medications from any pharmacy     Bring a paper prescription for each of these medications     insulin aspart 100 unit/mL Inpn pen    insulin detemir 100 unit/mL (3 mL) Inpn pen                  Please bring to all follow up appointments:    1. A copy of your discharge instructions.  2. All medicines you are currently taking in their original bottles.  3. Identification and insurance card.    Please arrive 15 minutes ahead of scheduled appointment time.    Please call 24 hours in advance if you must reschedule your appointment and/or time.        Follow-up Information     Follow up with Ronan Caldwell MD.    Specialty:  Family Medicine    Why:  message left for nurse    Contact information:    [address] [facility]  [address]  [facility] [address]  [phone]          Follow up with Ebony Alvarez MD In 1 week.    Specialty:  Nephrology    Why:  hospital follow up    Contact information:    [address] [facility] [address] [facility]  [address]  [facility] [address] [phone]          Discharge Instructions     Future Orders    Activity as tolerated     Call MD for:  difficulty breathing or increased cough     Call MD for:   "increased confusion or weakness     Call MD for:  persistent dizziness, light-headedness, or visual disturbances     Call MD for:  persistent nausea and vomiting or diarrhea     Diet Cardiac     Diet Diabetic 1800 Calories     Diet renal       Discharge References/Attachments     INSULIN ASPART INJECTION (ENGLISH)        Primary Diagnosis     Your primary diagnosis was:  Severe Uncontrolled High Blood Pressure      Admission Information     Date & Time Provider Department CSN    4/19/2017  1:21 AM Jhoana Acharya MD [facility] [id number]      Care Providers     Provider Role Specialty Primary office phone    Jhoana Acharya MD Attending Provider Internal Medicine [phone]    Ebony Alvarez MD Consulting Physician  Nephrology [phone]      Your Vitals Were     BP Pulse Temp Resp Height Weight    158/69 (BP Location: Right arm, Patient Position: Lying, BP Method: Automatic) 63 98 °F (36.7 °C) (Oral) 18 5' 11" (1.803 m) 76.8 kg (169 lb 5 oz)    SpO2 BMI             100% 23.61 kg/m2         Recent Lab Values        6/9/2014 10/27/2014 11/15/2014 8/18/2015 3/11/2016 3/14/2016 3/7/2017 3/8/2017      7:18 AM 11:13 AM  2:30 AM  3:11 AM  6:09 AM  1:20 PM 11:43 PM  8:29 AM    A1C 6.6 (H) 7.8 (H) 7.1 (H) 6.9 (H) 10.2 (H) 10.6 (H) 14.5 (H) 14.7 (H)    Comment for A1C at 11:43 PM on 3/7/2017:  According to ADA guidelines, hemoglobin A1C <7.0% represents  optimal control in non-pregnant diabetic patients.  Different  metrics may apply to specific populations.   Standards of Medical Care in Diabetes - 2016.  For the purpose of screening for the presence of diabetes:  <5.7%     Consistent with the absence of diabetes  5.7-6.4%  Consistent with increasing risk for diabetes   (prediabetes)  >or=6.5%  Consistent with diabetes  Currently no consensus exists for use of hemoglobin A1C  for diagnosis of diabetes for children.      Comment for A1C at  8:29 AM on 3/8/2017:  According to ADA guidelines, " hemoglobin A1C <7.0% represents  optimal control in non-pregnant diabetic patients.  Different  metrics may apply to specific populations.   Standards of Medical Care in Diabetes - 2016.  For the purpose of screening for the presence of diabetes:  <5.7%     Consistent with the absence of diabetes  5.7-6.4%  Consistent with increasing risk for diabetes   (prediabetes)  >or=6.5%  Consistent with diabetes  Currently no consensus exists for use of hemoglobin A1C  for diagnosis of diabetes for children.        Allergies as of 4/19/2017     No Known Allergies      [facility]     [facility] Nurse Care Line - 24/7 Assistance  Unless otherwise directed by your provider, please contact [facility], our nurse care line that is available for 24/7 assistance.     Registered nurses in the [facility] provide clinical advisement, health education, appointment booking, and other advisory services.  Call for this free service at [phone].        Advance Directives     An advance directive is a document which, in the event you are no longer able to make decisions for yourself, tells your healthcare team what kind of treatment you do or do not want to receive, or who you would like to make those decisions for you.  If you do not currently have an advance directive, [facility] encourages you to create one.  For more information call:  [phone]), [phone] ([phone]),  or log on to [email or web address].        Smoking Cessation     If you would like to quit smoking:   You may be eligible for free services if you are a [place] resident and started smoking cigarettes before September 1, 1988.  Call the Smoking Cessation Trust (SCT) toll free at ([phone]) 010-9664 or [phone] [phone].   Call [phone]-QUIT-NOW if you do not meet the above criteria.   Contact us via email: [email or web address]   View our website for more information: [email or web address]        Language  Assistance Services     ATTENTION: Language assistance services are available, free of charge. Please call [phone].      ATENCIÓN: Si barbie dumas, tiene a bowles disposición servicios gratuitos de asistencia lingüística. Lllokesh al [phone].     CHÚ Ý: N?u b?n nói Ti?ng Vi?t, có các d?ch v? h? tr? ngôn ng? mi?n phí dành cho b?n. G?i s? [phone].        Heart Failure Education       Heart Failure: Being Active  You have a condition called heart failure. Being active doesnt mean that you have to wear yourself out. Even a little movement each day helps to strengthen your heart. If you cant get out to exercise, you can do simple stretching and strengthening exercises at home. These are good ways to keep you well-conditioned and prevent you and your heart from becoming excessively weak.    Ideas to get you started  · Add a little movement to things you do now. Walk to mail letters. Park your car at the far end of the parking lot and walk to the store. Walk up a flight of stairs instead of taking the elevator.  · Choose activities you enjoy. You might walk, swim, or ride an exercise bike. Things like gardening and washing the car count, too. Other possibilities include: washing dishes, walking the dog, walking around the mall, and doing aerobic activities with friends.  · Join a group exercise program at a [facility] or [facility], a senior center, or a community center. Or look into a hospital cardiac rehabilitation program. Ask your doctor if you qualify.  Tips to keep you going  · Get up and get dressed each day. Go to a coffee shop and read a newspaper or go somewhere that you'll be in the presence of other active people. Youll feel more like being active.  · Make a plan. Choose one or more activities that you enjoy and that you can easily do. Then plan to do at least one each day. You might write your plan on a calendar.  · Go with a friend or a group if you like company. This can help you feel supported and  stay motivated, too.  · Plan social events that you enjoy. This will keep you mentally engaged as well as physically motivated to do things you find pleasure in.  For your safety  · Talk with your healthcare provider before starting an exercise program.  · Exercise indoors when its too hot or too cold outside, or when the air quality is poor. Try walking at a shopping mall.  · Wear socks and sturdy shoes to maintain your balance and prevent falls.  · Start slowly. Do a few minutes several times a day at first. Increase your time and speed little by little.  · Stop and rest whenever you feel tired or get short of breath.  · Dont push yourself on days when you dont feel well.  Date Last Reviewed: 3/20/2016  © 8653-0248 [employer]. [address] [place] [address], [place] [address]. All rights reserved. This information is not intended as a substitute for professional medical care. Always follow your healthcare professional's instructions.              Heart Failure: Evaluating Your Heart  You have a condition called heart failure. To evaluate your condition, your doctor will examine you, ask questions, and do some tests. Along with looking for signs of heart failure, the doctor looks for any other health problems that may have led to heart failure. The results of your evaluation will help your doctor form a treatment plan.  Health history and physical exam  Your visit will start with a health history. Tell the doctor about any symptoms youve noticed and about all medicines you take. Then youll have a physical exam. This includes listening to your heartbeat and breathing. Youll also be checked for swelling (edema) in your legs and neck. When you have fluid buildup or fluid in the lungs, it may be called congestive heart failure.  Diagnosing heart failure     During an echocardiogram, sound waves bounce off the heart. These are converted into a picture on the screen.   The following may be done to help your  doctor form a diagnosis:  · X-rays show the size and shape of your heart. These pictures can also show fluid in your lungs.  · An electrocardiogram (ECG or EKG) shows the pattern of your heartbeat. Small pads (electrodes) are placed on your chest, arms, and legs. Wires connect the pads to the ECG machine, which records your hearts electrical signals. This can give the doctor information about heart function.  · An echocardiogram uses ultrasound waves to show the structure and movement of your heart muscle. This shows how well the heart pumps. It also shows the thickness of the heart walls, and if the heart is enlarged. It is one of the most useful, non-invasive tests as it provides information about the heart's general function. This helps your doctor make treatment decisions.  · Lab tests evaluate small amounts of blood or urine for signs of problems. A BNP lab test can help diagnose and evaluate heart failure. BNP stands for B-type natriuretic peptide. The ventricles secrete more BNP when heart failure worsens. Lab tests can also provide information about metabolic dysfunction or heart dysfunction.  Your treatment plan  Based on the results of your evaluation and tests, your doctor will develop a treatment plan. This plan is designed to relieve some of your heart failure symptoms and help make you more comfortable. Your treatment plan may include:  · Medicine to help your heart work better and improve your quality of life  · Changes in what you eat and drink to help prevent fluid from backing up in your body  · Daily monitoring of your weight and heart failure symptoms to see how well your treatment plan is working  · Exercise to help you stay healthy  · Help with quitting smoking  · Emotional and psychological support to help adjust to the changes  · Referrals to other specialists to make sure you are being treated comprehensively  Date Last Reviewed: 3/21/2016  © 4220-7993 [employer] [address]  [place] [address]. All rights reserved. This information is not intended as a substitute for professional medical care. Always follow your healthcare professional's instructions.              Heart Failure: Making Changes to Your Diet  You have a condition called heart failure. When you have heart failure, excess fluid is more likely to build up in your body because your heart isn't working well. This makes the heart work harder to pump blood. Fluid buildup causes symptoms such as shortness of breath and swelling (edema). This is often referred to as congestive heart failure or CHF. Controlling the amount of salt (sodium) you eat may help stop fluid from building up. Your doctor may also tell you to reduce the amount of fluid you drink.  Reading food labels    Your healthcare provider will tell you how much sodium you can eat each day. Read food labels to keep track. Keep in mind that certain foods are high in salt. These include canned, frozen, and processed foods. Check the amount of sodium in each serving. Watch out for high-sodium ingredients. These include MSG (monosodium glutamate), baking soda, and sodium phosphate.   Eating less salt  Give yourself time to get used to eating less salt. It may take a little while. Here are some tips to help:  · Take the saltshaker off the table. Replace it with salt-free herb mixes and spices.  · Eat fresh or plain frozen vegetables. These have much less salt than canned vegetables.  · Choose low-sodium snacks like sodium-free pretzels, crackers, or air-popped popcorn.  · Dont add salt to your food when youre cooking. Instead, season your foods with pepper, lemon, garlic, or onion.  · When you eat out, ask that your food be cooked without added salt.  · Avoid eating fried foods as these often have a great deal of salt.  If youre told to limit fluids  You may need to limit how much fluid you have to help prevent swelling. This includes anything that is  liquid at room temperature, such as ice cream and soup. If your doctor tells you to limit fluid, try these tips:  · Measure drinks in a measuring cup before you drink them. This will help you meet daily goals.  · Chill drinks to make them more refreshing.  · Suck on frozen lemon wedges to quench thirst.  · Only drink when youre thirsty.  · Chew sugarless gum or suck on hard candy to keep your mouth moist.  · Weigh yourself daily to know if your body's fluid content is rising.  My sodium goal  Your healthcare provider may give you a sodium goal to meet each day. This includes sodium found in food as well as salt that you add. My goal is to eat no more than ___________ mg of sodium per day.     When to call your doctor  Call your doctor right away if you have any symptoms of worsening heart failure. These can include:  · Sudden weight gain  · Increased swelling of your legs or ankles  · Trouble breathing when youre resting or at night  · Increase in the number of pillows you have to sleep on  · Chest pain, pressure, discomfort, or pain in the jaw, neck, or back   Date Last Reviewed: 3/21/2016  © 5281-5505 [employer]. [address] [place] [address], [place] [address]. All rights reserved. This information is not intended as a substitute for professional medical care. Always follow your healthcare professional's instructions.              Heart Failure: Medicines to Help Your Heart    You have a condition called heart failure (also known as congestive heart failure, or CHF). Your doctor will likely prescribe medicines for heart failure and any underlying health problems you have. Most heart failure patients take one or more types of medicinen. Your healthcare provider will work to find the combination of medicines that works best for you.  Heart failure medicines  Here are the most common heart failure medicines:  · ACE inhibitors lower blood pressure and decrease strain on the heart. This makes it easier for  the heart to pump. Angiotensin receptor blockers have similar effects. These are prescribed for some patients instead of ACE inhibitors.  · Beta-blockers relieve stress on the heart. They also improve symptoms. They may also improve the heart's pumping action over time.  · Diuretics (also called water pills) help rid your body of excess water. This can help rid your body of swelling (edema). Having less fluid to pump means your heart doesnt have to work as hard. Some diuretics make your body lose a mineral called potassium. Your doctor will tell you if you need to take supplements or eat more foods high in potassium.  · Digoxin helps your heart pump with more strength. This helps your heart pump more blood with each beat. So, more oxygen-rich blood travels to the rest of the body.  · Aldosterone antagonists help alter hormones and decrease strain on the heart.  · Hydralazine and nitrates are two separate medicines used together to treat heart failure. They may come in one combination pill. They lower blood pressure and decrease how hard the heart has to pump.  Medicines for related conditions  Controlling other heart problems helps keep heart failure under control, too. Depending on other heart problems you have, medicines may be prescribed to:  · Lower blood pressure (antihypertensives).  · Lower cholesterol levels (statins).  · Prevent blood clots (anticoagulants or aspirin).  · Keep the heartbeat steady (antiarrhythmics).  Date Last Reviewed: 3/5/2016  © 0386-2819 [employer]. [address] [place] [address]. All rights reserved. This information is not intended as a substitute for professional medical care. Always follow your healthcare professional's instructions.              Heart Failure: Procedures That May Help    The heart is a muscle that pumps oxygen-rich blood to all parts of the body. When you have heart failure, the heart is not able to pump as well as it should. Blood  and fluid may back up into the lungs (congestive heart failure), and some parts of the body dont get enough oxygen-rich blood to work normally. These problems lead to the symptoms of heart failure.     Certain procedures may help the heart pump better in some cases of heart failure. Some procedures are done to treat health problems that may have caused the heart failure such as coronary artery disease or heart rhythm problems. For more serious heart failure, other options are available.  Treating artery and valve problems  If you have coronary artery disease or valve disease, procedures may be done to improve blood flow. This helps the heart pump better, which can improve heart failure symptoms. First, your doctor may do a cardiac catheterization to help detect clogged blood vessels or valve damage. During this procedure, a  thin tube (catheter) in inserted into a blood vessel and guided to the heart. There a dye is injected and a special type of X-ray (angiogram) is taken of the blood vessels. Procedures to open a blocked artery or fix damaged valves can also be done using catheterization.  · Angioplasty uses a balloon-tipped instrument at the end of the catheter. The balloon is inflated to widen the narrowed artery. In many cases, a stent is expanded to further support the narrowed artery. A stent is a metal mesh tube.  · Valve surgery repairs or replacement of faulty valves can also be done during catheterization so blood can flow properly through the chambers of the heart.  Bypass surgery is another option to help treat blocked arteries. It uses a healthy blood vessel from elsewhere in the body. The healthy blood vessel is attached above and below the blocked area so that blood can flow around the blocked artery.  Treating heart rhythm problems  A device may be placed in the chest to help a weak heart maintain a healthy, heartbeat so the heart can pump more effectively:  · Pacemaker. A pacemaker is an implanted  device that regulates your heartbeat electronically. It monitors your heart's rhythm and generates a painless electric impulse that helps the heart beat in a regular rhythm. A pacemaker is programmed to meet your specific heart rhythm needs.  · Biventricular pacing/cardiac resynchronization therapy. A type of pacemaker that paces both pumping chambers of the heart at the same time to coordinate contractions and to improve the heart's function. Some people with heart failure are candidates for this therapy.  · Implantable cardioverter defibrillator. A device similar to a pacemaker that senses when the heart is beating too fast and delivers an electrical shock to convert the fast rhythm to a normal rhythm. This can be a life saving device.  In severe cases  In more serious cases of heart failure when other treatments no longer work, other options may include:  · Ventricular assist devices (VADs). These are mechanical devices used to take over the pumping function for one or both of the heart's ventricles, or pumping chambers. A VAD may be necessary when heart failure progresses to the point that medicines and other treatments no longer help. In some cases, a VAD may be used as a bridge to transplant.  · Heart transplant. This is replacing the diseased heart with a healthy one from a donor. This is an option for a few people who are very sick. A heart transplant is very serious and not an option for all patients. Your doctor can tell you more.  Date Last Reviewed: 3/20/2016  © 5190-8203 [employer]. [address] [place] [address]. All rights reserved. This information is not intended as a substitute for professional medical care. Always follow your healthcare professional's instructions.              Heart Failure: Tracking Your Weight  You have a condition called heart failure. When you have heart failure, a sudden weight gain or a steady rise in weight is a warning sign that your body is  retaining too much water and salt. This could mean your heart failure is getting worse. If left untreated, it can cause problems for your lungs and result in shortness of breath. Weighing yourself each day is the best way to know if youre retaining water. If your weight goes up quickly, call your doctor. You will be given instructions on how to get rid of the excess water. You will likely need medicines and to avoid salt. This will help your heart work better.  Call your doctor if you gain more than 2 pounds in 1 day, more than 5 pounds in 1 week, or whatever weight gain you were told to report by your doctor. This is often a sign of worsening heart failure and needs to be evaluated and treated. Your doctor will tell you what to do next.   Tips for weighing yourself    · Weigh yourself at the same time each morning, wearing the same clothes. Weigh yourself after urinating and before eating.  · Use the same scale each day. Make sure the numbers are easy to read. Put the scale on a flat, hard surface -- not on a rug or carpet.  · Do not stop weighing yourself. If you forget one day, weigh again the next morning.  How to use your weight chart  · Keep your weight chart near the scale. Write your weight on the chart as soon as you get off the scale.  · Fill in the month and the start date on the chart. Then write down your weight each day. Your chart will look like this:    · If you miss a day, leave the space blank. Weigh yourself the next day and write your weight in the next space.  · Take your weight chart with you when you go to see your doctor.  Date Last Reviewed: 3/20/2016  © 4342-7667 [employer]. [address] [place] [address], [place] [address]. All rights reserved. This information is not intended as a substitute for professional medical care. Always follow your healthcare professional's instructions.              Heart Failure: Warning Signs of a Flare-Up  You have a condition called heart failure.  Once you have heart failure, flare-ups can happen. Below are signs that can mean your heart failure is getting worse. If you notice any of these warning signs, call your healthcare provider.  Swelling    · Your feet, ankles, or lower legs get puffier.  · You notice skin changes on your lower legs.  · Your shoes feel too tight.  · Your clothes are tighter in the waist.  · You have trouble getting rings on or off your fingers.  Shortness of breath  · You have to breathe harder even when youre doing your normal activities or when youre resting.  · You are short of breath walking up stairs or even short distances.  · You wake up at night short of breath or coughing.  · You need to use more pillows or sit up to sleep.  · You wake up tired or restless.  Other warning signs  · You feel weaker, dizzy, or more tired.  · You have chest pain or changes in your heartbeat.  · You have a cough that wont go away.  · You cant remember things or dont feel like eating.  Tracking your weight  Gaining weight is often the first warning sign that heart failure is getting worse. Gaining even a few pounds can be a sign that your body is retaining excess water and salt. Weighing yourself each day in the morning after you urinate and before you eat, is the best way to know if you're retaining water. Get a scale that is easy to read and make sure you wear the same clothes and use the same scale every time you weigh. Your healthcare provider will show you how to track your weight. Call your doctor if you gain more than 2 pounds in 1 day, 5 pounds in 1 week, or whatever weight gain you were told to report by your doctor. This is often a sign of worsening heart failure and needs to be evaluated and treated before it compromises your breathing. Your doctor will tell you what to do next.    Date Last Reviewed: 3/15/2016  © 6458-6390 [employer]. [address] [place] [address], [place] [address]. All rights reserved. This information is  not intended as a substitute for professional medical care. Always follow your healthcare professional's instructions.              Chronic [place] Disease Education             Diabetes Discharge Instructions                                   [facility] Sign-Up     Activating your [facility] account is as easy as 1-2-3!     1) Visit [email or web address], select Sign Up Now, enter this activation code and your date of birth, then select Next.  [email or web address]  Expires: 4/23/2017  9:50 AM      2) Create a username and password to use when you visit [facility] in the future and select a security question in case you lose your password and select Next.    3) Enter your e-mail address and click Sign Up!    Additional Information  If you have questions, please e-mail [email or web address] or call [phone] to talk to our [facility] staff. Remember, [facility] is NOT to be used for urgent needs. For medical emergencies, dial 911.          [facility] complies with applicable Federal civil rights laws and does not discriminate on the basis of race, color, national origin, age, disability, or sex.

## 2017-04-21 NOTE — DISCHARGE SUMMARY
"[facility] Discharge Summary    Primary Team: [facility] Team A   Attending Physician: Patrizia  Resident: Jania  Intern: Holmes    Date of Admit: 4/19/2017  Date of Discharge: 4/19/2017    Discharge to: Home   Condition: Stable    Discharge Diagnoses     Patient Active Problem List   Diagnosis    Mitral regurgitation    CAD (coronary artery disease)    Cerebrovascular disease    Uncontrolled type 2 diabetes mellitus with chronic kidney disease on chronic dialysis, with long-term current use of insulin    Hyperlipidemia LDL goal <70    Hypothyroid    Anemia    Mitral valve replaced    Coronary artery disease involving coronary bypass graft of native heart without angina pectoris    ESRD on hemodialysis    Essential hypertension    Abrasion foot/toe    Gangrene of toe    NSTEMI (non-ST elevated myocardial infarction)    Personal history of noncompliance with medical treatment, presenting hazards to health    Hyperglycemia without ketosis    Fluid overload    Thrombocytopenia    Hypertensive emergency    Acute on chronic systolic heart failure    Pulmonary hypertension       Consultants and Procedures     Consultants:  renal    Procedures:   none    Brief History of Present Illness      Williams Bland is a 67 y.o. male who  has a past medical history of CHF (congestive heart failure); Coronary artery disease; CVA (cerebral vascular accident) (2013); Diabetes mellitus; ESRD (end stage renal disease); Hypertension; and Stroke.  The patient presented to [facility] on 4/19/2017 with a primary complaint of SOB for 1 day. Patient is a transfer. The patient was in his USOH, on home O2 2L, s/p R BKA, walks with walker, until day of admit he had some fried fish after which he started feeling SOB. He denies any LE edema, CP, pleuritic CP, fevers, chills, dysuria, diarrhea. Patient reports that his diet is "not good" and measures his glucose regular in the 200s. He does not measure " BP at home.      For the full HPI please refer to the History & Physical from this admission.    Hospital Course By Problem with Pertinent Findings     Hyperglycemia/Uncontrolled DMII with neuropathy   - uncontrolled DM  3/2017 A1C 14.7  likely non-compliance/inappropriate regimen (basal insulin 5U)  -  at [place], normal AG  not DKA and HHS  - nonadherent with medications and diet  counseled on adherence and diet  - s/p 7U insulin subQ at [place]  insulin ggt initiated with control of BG  - increased home insulin on day of discharge  Lantus 10u nightly and Aspart 5u aAC  instructed to follow up with PCP     ESRD with volume overload and hypertensive emergency   - reported SOB after eating fried fish  stable on RA at [place] and arrival at [facility]  - Exam with minimal basilar crackles  BNP 3058   - nonadherent with medications and diet  counseled on adherece  - CXR at [place] with b/l infiltrates/CHF per ED physician read  repeat CXR  - s/p Lasix 120mg IV at [place]  - improvement after session of HD and lasix  - patient reports follows Dr. Alvarez  - consulted [facility]  patient received 1 session of HD  instructed to follow up with nephrologist     Thrombocytopenia   - Plt 96 since 3/2017  Plt 126 on admit  - no signs of bleeding  will monitor      CAD s/p CABG/stents   - 3/2017 [facility] with LCX 70% stenosis, prox/mid RCA 80% stenosis   - continue ASA/atorvastatin/Plavix/Coreg/Imdur      Acute on Chronic Systolic Heart Failure  - 3/2017 ECHO EF 35%, mod-severe TR, pulm HTN 88  - s/p Lasix 120mg IV  Home meds Lasix 80mg TThSatSun     H/o CVA   - patient reports residual hand  weakness  unremarkable on exam  - Continue ASA/atrovastatin     HTN  -  on admit  continue Norvasc/Imdur/Lisinopril/Coreg  - controlled after HD and home medications      HLD  - 3/2017 lipid panel: chol 79, LDL 28.6, TG 47   - continue atorvastatin     Normocytic Anemia   - 2015 Fe study c/w Anemia of  chronic disease  - will repeat Fe study     Hypothyroidism  - continue synthroid     Discharge Medications        Medication List      START taking these medications          insulin aspart 100 unit/mL Inpn pen   Commonly known as:  NovoLOG   Inject 5 Units into the skin 3 (three) times daily with meals.         CHANGE how you take these medications          amlodipine 10 MG tablet   Commonly known as:  NORVASC   Take 1 tablet (10 mg total) by mouth once daily.   What changed:  how much to take       furosemide 80 MG tablet   Commonly known as:  LASIX   Take 1 tablet (80 mg total) by mouth once daily.   What changed:    - when to take this  - additional instructions       insulin detemir 100 unit/mL (3 mL) Inpn pen   Commonly known as:  LEVEMIR FLEXTOUCH   Inject 10 Units into the skin every evening.   What changed:    - how much to take  - when to take this       sevelamer carbonate 800 mg Tab   Commonly known as:  RENVELA   Take 1 tablet (800 mg total) by mouth 3 (three) times daily with meals.   What changed:  how much to take         CONTINUE taking these medications          albuterol sulfate 2.5 mg/0.5 mL Nebu   Take 2.5 mg by nebulization every 8 (eight) hours.       allopurinol 100 MG tablet   Commonly known as:  ZYLOPRIM       aspirin 81 MG EC tablet   Commonly known as:  ECOTRIN   Take 1 tablet (81 mg total) by mouth once daily.       atorvastatin 40 MG tablet   Commonly known as:  LIPITOR   Take 1 tablet (40 mg total) by mouth once daily.       carvedilol 25 MG tablet   Commonly known as:  COREG   Take 1 tablet (25 mg total) by mouth 2 (two) times daily with meals.       clopidogrel 75 mg tablet   Commonly known as:  PLAVIX   Take 1 tablet (75 mg total) by mouth once daily.       collagenase ointment   Apply topically once daily.       CONTOUR TEST STRIPS Strp   Generic drug:  blood sugar diagnostic       ENSURE HIGH PROTEIN Powd   Generic drug:  protein       hydrocodone-acetaminophen 5-325mg 5-325 mg  per tablet   Commonly known as:  NORCO   Take 1 tablet by mouth every 6 (six) hours as needed for Pain.       isosorbide mononitrate 60 MG 24 hr tablet   Commonly known as:  IMDUR   Take 1 tablet (60 mg total) by mouth once daily.       levothyroxine 125 MCG tablet   Commonly known as:  SYNTHROID       lisinopril 20 MG tablet   Commonly known as:  PRINIVIL,ZESTRIL   Take 1 tablet (20 mg total) by mouth once daily.       lubiprostone 8 MCG Cap   Commonly known as:  AMITIZA       miconazole 2 % cream   Commonly known as:  MICOTIN   Apply topically 2 (two) times daily.       oxycodone-acetaminophen  mg per tablet   Commonly known as:  PERCOCET       pantoprazole 40 MG tablet   Commonly known as:  PROTONIX   Take 1 tablet by mouth daily.       pregabalin 75 MG capsule   Commonly known as:  LYRICA       silver sulfADIAZINE 1% 1 % cream   Commonly known as:  SILVADENE   Apply 1 application topically 2 (two) times daily.       trazodone 100 MG tablet   Commonly known as:  DESYREL   Take 1 tablet (100 mg total) by mouth every evening.         STOP taking these medications          glimepiride 2 MG tablet   Commonly known as:  AMARYL            Where to Get Your Medications      You can get these medications from any pharmacy     Bring a paper prescription for each of these medications     insulin aspart 100 unit/mL Inpn pen    insulin detemir 100 unit/mL (3 mL) Inpn pen             Discharge Information:   Diet:  Renal diabetic cardiac    Physical Activity:  As tolerated    Instructions:  1. Take all medications as prescribed  2. Keep all follow-up appointments  3. Return to the hospital or call your primary care physicians if any worsening symptoms such as CP, SOB, confusion, dizziness, hyperglycemia occur.    Follow-Up Appointments:  PCP and renal follow up    Edis Cyr  [facility], LISA

## 2017-06-22 NOTE — PROGRESS NOTES
87 y/o male who was sitting in chair while family was at work. Around 1900 he started to c/o being very tired so took a nap. At 2000 he was checked up on and still c/o being tired and generalized weak but weaker on the left. Around 2200 family noticed facial droop and drooling so she decided to bring to ED to be evaluated. Patient has baseline dementia so unable to answer question correctly.   Patient walks with a cane due to DPN.   Upon evaluation patient states both his legs feel heavy and arm but the left side is weaker than the right. Facial droop on left present  Risk factors HTN, DM, CKD   Preliminary A/P note:    NSTEMI (non-ST elevated myocardial infarction)  · Intitial EKG findings shows no definite ST elevation MI, but there are concerns with ST segment changes in V1, V2, V3 - consulting cardiologist STAT  · Initial troponin is within normal limits = 0.06  - repeat ordered and came back elevated at 1.210  · high risk for MI;  hypertension, hyperlipidemia, coronary artery disease, noncompliance, male gender, age, aspirin use   · Initiate ACS   · Trend cardiac enzymes  · Aspirin  · Beta-blocker  · Statin  · Supplemental oxygen  · nitroglycerine and morphine PRN  · 2D echocardiogram  · TSH, FT3, FT4  · ESR and cardiac specific CRP   · PT, PTT, INR   · Tight glycemic control  · Monitor on telemetry unit  · Consult cardiologist    Volume overload  · Secondary to noncompliance with dialysis  · Patient missed 2 dialysis sessions because he states he needed to be seen by pain management      CAD (coronary artery disease)  · evidence of coronary ischemia at this time as above  · Maintain adequate blood pressure control  · Heart healthy diet once nothing by mouth status is lifted  · Aspirin  · Statin    Personal history of noncompliance with medical treatment, presenting hazards to health  · Counseling given  · Noncompliance is the most prominent feature underlying the etiology of his presentation which includes volume overload, hypertension, and non-ST elevation MI      Renovascular hypertension  · Goal while inpatient is a systolic blood pressure less than 140mmHg  · BP is not in acceptable range at this time; low threshold to initiate nitroglycerin drip until hemodialysis is initiated given his elevated troponin level as well  · Continue current home regimen with hold parameters  · PRN antihypertensives available        Cerebrovascular disease  · No evidence of acute stroke at this time  · Maintain adequate blood pressure control  · Heart healthy diet  · Aspirin  · Statin          Type 2 diabetes  mellitus  Initially presented with a blood glucose level in 800s at the outside facility.  Our point-of-care testing here showed blood in his level of 435 after insulin was given.  Hemoglobin A1c from March 14, 2016 was 10.6, demonstrating uncontrolled diabetes.  Again, highly suspicious for medication and diet noncompliance.  · BG is not in acceptable range at this time  · Maintain w/ subcutaneous insulin management order set  · Hold oral diabetic meds  · ADA 1800 kcal diet  · BG goal while in patient is <180mg/dL  · HgA1c = Pending      Hyperlipidemia LDL goal <70  · Lipid panel - pending  · Cardiac diet  · Continue statin        Thyroid disease  Thyroid dysfunction  · Clinically, patient is euthyroid   · Chemically, undetermined  · Obtain TSH, free T3, and free T4  · Continue current regimen      Mitral valve replaced    · Supportive care    ESRD on hemodialysis  ·  acute issues with significant volume overload,   · No electrolyte abnormality, or acid-base abnormality at this time  · Will need urgent dialysis while inpatient  · Nephrology consulted

## 2017-07-17 PROBLEM — R53.1 WEAKNESS: Status: ACTIVE | Noted: 2017-01-01

## 2017-07-17 PROBLEM — S80.812A ABRASION OF LEFT LOWER EXTREMITY: Status: ACTIVE | Noted: 2017-01-01

## 2017-07-17 NOTE — PLAN OF CARE
Problem: Occupational Therapy Goal  Goal: Occupational Therapy Goal  Goals to be met by: 8/17/17     Patient will increase functional independence with ADLs by performing:    LE Dressing with Modified [place].  Grooming while standing with Modified [place].  Toileting from toilet with Modified [place] for hygiene and clothing management.   Stand pivot transfers with Modified [place].  Toilet transfer to toilet with Modified [place].  Increased functional strength to WFL for self care skills and functional mobility.  Upper extremity exercise program x10 reps per handout, with independence.    Outcome: Ongoing (interventions implemented as appropriate)  Pt would benefit from cont OT services in order to maximize functional independence. Recommending home at d/c when medically stable.

## 2017-07-17 NOTE — PLAN OF CARE
Problem: Patient Care Overview  Goal: Plan of Care Review  Pt on room air with SpO2 96 %. No respiratory distress noted. Will continue to monitor.

## 2017-07-17 NOTE — PLAN OF CARE
Dr. Mills notified, pt , pt MD sanket to order moderate SSI to give with scheduled insulin, no further orders noted.

## 2017-07-17 NOTE — PT/OT/SLP EVAL
Occupational Therapy  Evaluation    Williams Bland   MRN: [id number]   Admitting Diagnosis: ESRD on hemodialysis    OT Date of Treatment: 07/17/17   OT Start Time: 1548  OT Stop Time: 1606  OT Total Time (min): 18 min    Billable Minutes:  Evaluation 18    Diagnosis: ESRD on hemodialysis   The primary encounter diagnosis was Hyperglycemia. 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      Past Medical History:   Diagnosis Date    CHF (congestive heart failure)     Coronary artery disease     CVA (cerebral vascular accident) 2013    Diabetes mellitus     ESRD (end stage renal disease)     Hypertension     Stroke       Past Surgical History:   Procedure Laterality Date    CARDIAC SURGERY      PTCA WITH STENT PLACEMENT    THYROID SURGERY             General Precautions: Standard, fall (Simultaneous filing. User may not have seen previous data.)  Orthopedic Precautions: N/A (Simultaneous filing. User may not have seen previous data.)  Braces:            Patient History:  Living Environment  Lives With: spouse  Living Arrangements: mobile home  Home Accessibility: stairs to enter home  Number of Stairs to Enter Home: 5  Stair Railings at Home: outside, present at both sides  Living Environment Comment: Pt reports living with spouse in mobile home, 5 steps to enter, B rails, tub/shower combo with shower chair. Has rollator and RW for ambulation as well as w/c. States mod I as PLOF; drives to dialysis. Prosthesis RLE (Simultaneous filing. User may not have seen previous data.)  Equipment Currently Used at Home: shower chair    Prior level of function:   Bed Mobility/Transfers: needs device  Grooming: independent  Bathing: needs  device  Upper Body Dressing: independent  Lower Body Dressing: independent  Toileting: independent  Home Management Skills: independent  Driving License: Yes  Mode of Transportation: Car     Dominant hand: right    Subjective:  Communicated with nsg prior to session.  Pt states he does everything for himself; upset when asked if his wife was physically able to assist if required   Chief Complaint: none stated  Patient/Family stated goals: none stated    Pain/Comfort  Pain Rating 1: 0/10 (Simultaneous filing. User may not have seen previous data.)    Objective:       Cognitive Exam:  Oriented to: Person and Situation  Follows Commands/attention: Follows multistep  commands  Communication: clear/fluent  Memory:  Impaired LTM  Safety awareness/insight to disability: impaired slightly   Coping skills/emotional control: Appropriate to situation    Visual/perceptual:  Intact    Physical Exam:  Postural examination/scapula alignment: Rounded shoulder  Skin integrity: Wound LLE frmom fall   Edema: None noted     Sensation:   Intact  B UEs     Upper Extremity Range of Motion:  Right Upper Extremity: WFL  Left Upper Extremity: WFL    Upper Extremity Strength:  Right Upper Extremity: 4-/ 4 out of 5  Left Upper Extremity: 4-/4 out of 5    Strength: fair + B     Fine motor coordination:   Intact; limited finger opposition R hand         Functional Mobility:  Bed Mobility:  Scooting/Bridging: Stand by Assistance, Supervision  Supine to Sit: Stand by Assistance, Supervision  Sit to Supine: Stand by Assistance, Supervision    Transfers:  Sit <> Stand Assistance: Contact Guard Assistance  Sit <> Stand Assistive Device: Rolling Walker    Functional Ambulation: CGA- SBa with RW     Activities of Daily Living:    LE Dressing Level of Assistance: Stand by assistance      Balance:   Static Sit: NORMAL: No deviations seen in posture held statically  Dynamic Sit: NORMAL: No deviations seen in posture held dynamically  Static Stand:  "FAIR+: Takes MINIMAL challenges from all directions  Dynamic stand: FAIR: Needs CONTACT GUARD during gait/FAIR +   \  AM-PAC 6 CLICK ADL  How much help from another person does this patient currently need?  1 = Unable, Total/Dependent Assistance  2 = A lot, Maximum/Moderate Assistance  3 = A little, Minimum/Contact Guard/Supervision  4 = None, Modified [place]/Independent    Putting on and taking off regular lower body clothing? : 3  Bathing (including washing, rinsing, drying)?: 3  Toileting, which includes using toilet, bedpan, or urinal? : 3  Putting on and taking off regular upper body clothing?: 4  Taking care of personal grooming such as brushing teeth?: 4  Eating meals?: 4  Total Score: 21    AM-PAC Raw Score CMS "G-Code Modifier Level of Impairment Assistance   6 % Total / Unable   7 - 9 CM 80 - 100% Maximal Assist   10-14 CL 60 - 80% Moderate Assist   15 - 19 CK 40 - 60% Moderate Assist   20 - 22 CJ 20 - 40% Minimal Assist   23 CI 1-20% SBA / CGA   24 CH 0% Independent/ Mod I       Patient left supine with all lines intact, call button in reach, bed alarm on and nsg notified    Assessment:  Williams Bland is a 67 y.o. male with a medical diagnosis of ESRD on hemodialysis and presents with fall. Pt would benefit from cont OT services in order to maximize functional independence. Recommending home at d/c when medically stable.     Rehab identified problem list/impairments: Rehab identified problem list/impairments: weakness, impaired functional mobilty, impaired self care skills, impaired endurance, impaired balance    Rehab potential is good.    Activity tolerance: Good    Discharge recommendations: Discharge Facility/Level Of Care Needs: home     Barriers to discharge: Barriers to Discharge: None    Equipment recommendations: none     GOALS:    Occupational Therapy Goals        Problem: Occupational Therapy Goal    Goal Priority Disciplines Outcome Interventions   Occupational Therapy Goal     OT, " PT/OT Ongoing (interventions implemented as appropriate)    Description:  Goals to be met by: 8/17/17     Patient will increase functional independence with ADLs by performing:    LE Dressing with Modified [place].  Grooming while standing with Modified [place].  Toileting from toilet with Modified [place] for hygiene and clothing management.   Stand pivot transfers with Modified [place].  Toilet transfer to toilet with Modified [place].  Increased functional strength to WFL for self care skills and functional mobility.  Upper extremity exercise program x10 reps per handout, with independence.                      PLAN:  Patient to be seen 5 x/week to address the above listed problems via self-care/home management, therapeutic activities, therapeutic exercises  Plan of Care expires: 08/17/17  Plan of Care reviewed with: patient    OT G-codes  Functional Assessment Tool Used: yoko pac   Score: 21  Functional Limitation: Self care  Self Care Current Status (): CJ  Self Care Goal Status (): NELLIE Galvez OT  07/17/2017

## 2017-07-17 NOTE — CONSULTS
NEPHROLOGY CONSULT NOTE    HPI & INTERVAL HISTORY:    Past Medical History:   Diagnosis Date    CHF (congestive heart failure)     Coronary artery disease     CVA (cerebral vascular accident) 2013    Diabetes mellitus     ESRD (end stage renal disease)     Hypertension     Stroke       Past Surgical History:   Procedure Laterality Date    CARDIAC SURGERY      PTCA WITH STENT PLACEMENT    THYROID SURGERY        Review of patient's allergies indicates:  No Known Allergies     (Not in a hospital admission)    Social History     Social History    Marital status:      Spouse name: N/A    Number of children: N/A    Years of education: N/A     Occupational History    Not on file.     Social History Main Topics    Smoking status: Former Smoker     Quit date: 9/22/2000    Smokeless tobacco: Never Used    Alcohol use No    Drug use: No    Sexual activity: Not Currently     Other Topics Concern    Not on file     Social History Narrative    No narrative on file        MEDS   [START ON 7/18/2017] amlodipine  10 mg Oral Daily    aspirin  81 mg Oral Daily    atorvastatin  40 mg Oral Daily    calcium gluconate IVPB  1 g Intravenous ED 1 Time    carvedilol  3.125 mg Oral BID    clopidogrel  75 mg Oral Daily    [START ON 7/18/2017] furosemide  80 mg Oral Daily    heparin (porcine)  5,000 Units Subcutaneous Q8H    insulin aspart  5 Units Subcutaneous TIDWM    insulin detemir  10 Units Subcutaneous QHS    isosorbide mononitrate  60 mg Oral Daily    [START ON 7/18/2017] levothyroxine  125 mcg Oral Before breakfast    [START ON 7/18/2017] lubiprostone  8 mcg Oral Daily with breakfast    pantoprazole  40 mg Oral Daily    sevelamer carbonate  800 mg Oral TID WM        CONTINOUS INFUSIONS:      Intake/Output Summary (Last 24 hours) at 07/17/17 1519  Last data filed at 07/17/17 1155   Gross per 24 hour   Intake              800 ml   Output             2300 ml   Net            -1500 ml         HEMODYNAMICS:  On exam -no CP, no SOB, no cough, no fever, no chills,  Na nausea, no vomiting, no diarrhea  Temp:  [98 °F (36.7 °C)-98.2 °F (36.8 °C)] 98 °F (36.7 °C)  Pulse:  [56-88] 56  Resp:  [16-20] 18  SpO2:  [97 %-100 %] 98 %  BP: (103-160)/(51-97) 124/58   Gen: NAD  Cards: Pulse 56  /58  Pul: diminished breath sounds  Abdomen soft   Ext: edema   R BKA  Skin:dry   Dialysis Access:  Left arm AV access  Asterixis negative  LABS   Lab Results   Component Value Date    WBC 6.90 07/17/2017    HGB 11.7 (L) 07/17/2017    HCT 38.0 (L) 07/17/2017    MCV 83 07/17/2017     (L) 07/17/2017          Recent Labs  Lab 07/17/17  0730   *   CALCIUM 9.1   ALBUMIN 3.3*   PROT 6.3   *   K 6.4*   CO2 22*   CL 89*   BUN 66*   CREATININE 6.0*   ALKPHOS 189*   ALT 81*   AST 60*   BILITOT 0.5      Lab Results   Component Value Date    .0 (H) 08/31/2015    CALCIUM 9.1 07/17/2017    PHOS 5.1 (H) 04/19/2017      Lab Results   Component Value Date    IRON 46 04/19/2017    TIBC 186 (L) 04/19/2017    FERRITIN 1,866 (H) 04/19/2017        ABG    Recent Labs  Lab 07/17/17  0858   PH 7.341*   PO2 86   PCO2 45.6*   HCO3 24.6   BE -1         IMAGING:  CXR    ASSESSMENT / PLAN  ESRD, Diabetes Mellitus  Hyponatremia  Hyperkalemia  Metabolic acidosis  MBD  Corrected calcium 9.6  Anemia secondary to CKD  Hb 11.7  Albumin 3.3.  Blood pressure 124/58    3/8/17:    1- Moderately depressed left ventricular systolic function (EF 35-40%).     2 - Eccentric hypertrophy.     3 - Mildly depressed right ventricular systolic function .     4 - Pulmonary hypertension. The estimated PA systolic pressure is 88 mmHg.   CXR- Cardiac monitoring leads overlie the chest. There are postsurgical changes of prior median sternotomy. Surgical clips are identified at the base of the neck. The cardiomediastinal silhouette is enlarged, similar to prior examination. There are increased interstitial markings bilaterally with bilateral  perihilar groundglass opacities suggestive of edema. No new focal air space consolidation is appreciated. There is loculated pleural fluid versus pleural thickening with associated atelectasis again identified at the right lung base. There is no evidence of pneumothorax. Visualized osseous structures appear intact.  Hemodialysis today  UF 1500 cc   BMP in am  Weight daily  Renal, low potassium, ADA diet

## 2017-07-17 NOTE — H&P
[facility] Internal Medicine History and Physical - Resident Note    Admitting Team: [facility] Internal Medicine Team A  Attending Physician: Dr. Jhoana Acharya  Resident: Dr. Mary Rosas  Interns: Dr. Amira Mills and Dr. Lorie Campos    Date of Admit: 7/17/2017    Chief Complaint     Left leg weakness x 4 days    Subjective:      History of Present Illness:  Williams Bland is a 67 y.o. Male with a PMHx of HFrEF, CAD, CVA (2013), DM2 uncontrolled, CKD-V on HD (makes urine, HD via left arm fistula on MWF at [facility] on [address]?), and HTN who presented to the ED with leg weakness x 4 days. Patient reports he was on his way to dialysis on the morning of admit when his legs felt very weak/slightly numb, causing him to fall. Did scrape his left knee and left shin, but denies pain. Denies hitting his head, loss of conscious, dizziness or nausea/vomiting prior to fall. Reports his last dialysis was Friday prior to admit. Reports he missed his Tuesday session (but patient is scheduled MWF). Reports he never misses any medication (but patient has chart history of non-compliance). States his diet is full of meat, low salt, and some candy.     Patient reports some right arm weakness for the past few months causing him to drop things. Also reports some constipation (last BM 5 days ago).    Denies chest pain, shortness of breath, weight gain, weight loss, LE edema, increasing abdominal girth. Denies F/C/N/V. Denies recent illness.    Past Medical History:  Past Medical History:   Diagnosis Date    CHF (congestive heart failure)     Coronary artery disease     CVA (cerebral vascular accident) 2013    Diabetes mellitus     ESRD (end stage renal disease)     Hypertension     Stroke        Past Surgical History:  Past Surgical History:   Procedure Laterality Date    CARDIAC SURGERY      PTCA WITH STENT PLACEMENT    THYROID SURGERY         Allergies:  No Known Allergies    Home Medications:  Prior to Admission medications     Medication Sig Start Date End Date Taking? Authorizing Provider   albuterol sulfate 2.5 mg/0.5 mL Nebu Take 2.5 mg by nebulization every 8 (eight) hours. 1/7/15 1/7/16  NITESH Comer, TASHIA   allopurinol (ZYLOPRIM) 100 MG tablet Take 100 mg by mouth every morning.     Historical Provider, MD   amlodipine (NORVASC) 10 MG tablet Take 1 tablet (10 mg total) by mouth once daily.  Patient taking differently: Take 5 mg by mouth once daily.  3/9/17 4/19/17  Manuel Greenwood MD   aspirin (ECOTRIN) 81 MG EC tablet Take 1 tablet (81 mg total) by mouth once daily. 3/9/17 3/9/18  Manuel Greenwood MD   atorvastatin (LIPITOR) 40 MG tablet Take 1 tablet (40 mg total) by mouth once daily. 8/4/15   Nasim Torres MD   blood sugar diagnostic (CONTOUR TEST STRIPS) Strp USE TO CHECK BLOOD SUGAR THREE TIMES A DAY. 9/8/14   Historical Provider, MD   carvedilol (COREG) 25 MG tablet Take 1 tablet (25 mg total) by mouth 2 (two) times daily with meals. 3/14/17 4/19/17  Minesh Lenz NP   clopidogrel (PLAVIX) 75 mg tablet Take 1 tablet (75 mg total) by mouth once daily. 3/9/17 4/19/17  Manuel Greenwood MD   collagenase ointment Apply topically once daily. 7/23/15   NITESH Comer, TASHIA   furosemide (LASIX) 80 MG tablet Take 1 tablet (80 mg total) by mouth once daily.  Patient taking differently: Take 80 mg by mouth. Take 4 times a week on Tuesday, Thursday, Saturday, and Sunday. 9/2/15   Francisco Trinidad MD   hydrocodone-acetaminophen 5-325mg (NORCO) 5-325 mg per tablet Take 1 tablet by mouth every 6 (six) hours as needed for Pain. 8/20/15   Nasim Torres MD   insulin aspart (NOVOLOG) 100 unit/mL InPn pen Inject 5 Units into the skin 3 (three) times daily with meals. 4/19/17 4/19/18  Edis Cyr MD   insulin detemir (LEVEMIR FLEXTOUCH) 100 unit/mL (3 mL) SubQ InPn pen Inject 10 Units into the skin every evening. 4/19/17 4/19/18  Edis Cyr MD   isosorbide mononitrate (IMDUR)  60 MG 24 hr tablet Take 1 tablet (60 mg total) by mouth once daily. 2/9/17   Nasim Torres MD   levothyroxine (SYNTHROID) 125 MCG tablet Take 125 mcg by mouth once daily.    Historical Provider, MD   lisinopril (PRINIVIL,ZESTRIL) 20 MG tablet Take 1 tablet (20 mg total) by mouth once daily. 3/9/17 4/8/17  Manuel Greenwood MD   lubiprostone (AMITIZA) 8 MCG Cap Take 8 mcg by mouth 2 (two) times daily.    Historical Provider, MD   miconazole (MICOTIN) 2 % cream Apply topically 2 (two) times daily. 1/7/15   NITESH Comer ANP   oxycodone-acetaminophen (PERCOCET)  mg per tablet Take 1 tablet by mouth every 6 (six) hours as needed for Pain.    Historical Provider, MD   pantoprazole (PROTONIX) 40 MG tablet Take 1 tablet by mouth daily. 12/14/16   NITESH Cmoer ANP   pregabalin (LYRICA) 75 MG capsule Take 75 mg by mouth 2 (two) times daily. Take before and after dialysis    Historical Provider, MD   protein (ENSURE HIGH PROTEIN) Powd Take by mouth 3 (three) times daily. 3 teaspoons PO TID    Historical Provider, MD   sevelamer carbonate (RENVELA) 800 mg Tab Take 1 tablet (800 mg total) by mouth 3 (three) times daily with meals.  Patient taking differently: Take 1,600 mg by mouth 3 (three) times daily with meals.  4/7/16 4/19/17  Lee Ann Wade, SHAKEEL   silver sulfADIAZINE 1% (SILVADENE) 1 % cream Apply 1 application topically 2 (two) times daily. 7/16/15   Nasim Torres MD   trazodone (DESYREL) 100 MG tablet Take 1 tablet (100 mg total) by mouth every evening. 10/27/16   Nasim Torres MD       Family History:  Father: HTN  Mother: DM    Social History:  Tobacco: former smoker (quit in 2000); 2 ppd x 40 years  Ethanol: denies  Illicits: denies    Review of Systems:  Pertinent items are noted in HPI. All other systems are reviewed and are negative.    Health Maintaince :   Primary Care Physician: Dr. Caldwell (in [place])  Cardiologist: does not remember name  Nephrologist at [facility]: does not  "remember name  Immunizations:   TDap is up to date.  Influenza is up to date.  Pneumovax is up to date.  Cancer Screening:  Colonoscopy: is not up to date. Unsure of date, but states >10 years ago (last one normal, per pt).     Objective:   Last 24 Hour Vital Signs:  BP  Min: 110/66  Max: 160/97  Temp  Av.2 °F (36.8 °C)  Min: 98.1 °F (36.7 °C)  Max: 98.2 °F (36.8 °C)  Pulse  Av.3  Min: 57  Max: 88  Resp  Av  Min: 16  Max: 20  SpO2  Av %  Min: 97 %  Max: 99 %  Height  Av' 11" (180.3 cm)  Min: 5' 11" (180.3 cm)  Max: 5' 11" (180.3 cm)  Weight  Av.1 kg (170 lb)  Min: 77.1 kg (170 lb)  Max: 77.1 kg (170 lb)  Body mass index is 23.71 kg/m².  No intake/output data recorded.    Physical Examination:  /66 (BP Location: Right arm, BP Method: Automatic)   Pulse 61   Temp 98.2 °F (36.8 °C) (Oral)   Resp 18   Ht 5' 11" (1.803 m)   Wt 77.1 kg (170 lb)   SpO2 98%   BMI 23.71 kg/m²   General appearance: alert, cooperative and no distress  Head: Normocephalic, without obvious abnormality, atraumatic  Eyes: pinpoint pupils, EOMI, anicteric sclera  Throat: lips, tongue normal; adequate dentition but missing several teeth; no pharyngeal exudate/erythema  Neck: no adenopathy, no carotid bruit and no JVD  Lungs: bibasilar crackles, no wheezes  Heart: regular rate and rhythm, S1, S2 normal, no murmur, click, rub or gallop    Abdomen: soft, non-tender, non-distended, normoactive BS  Pulses: 1+ b/l radial, 1+ dorsalis pedis on LLE  Extremities: below the knee amputation of RLE; LLE: 4 linear, ~1 cm skin abrasions to knee and calf without surrounding erythema/induration pr purulence; non-pitting edema to ankle of LLE; chronic nail changes to LLE toe-nails  Neuro: alert and oriented to person, place; sensation grossly intact throughout; 4/5 strength in bilateral UE; 3/5 strength in LLE    Laboratory:  Most Recent Data:  CBC: Lab Results   Component Value Date    WBC 6.90 2017    HGB 11.7 (L) " 07/17/2017    HCT 38.0 (L) 07/17/2017     (L) 07/17/2017    MCV 83 07/17/2017    RDW 15.9 (H) 07/17/2017     WBC Differential: 80.7 % N, 0 % Bands, 10 % L, 6.2 % M, 2.8 % Eo, 0.3 % Baso, 0 additional cells seen  BMP: Lab Results   Component Value Date     (L) 07/17/2017    K 6.4 (HH) 07/17/2017    CL 89 (L) 07/17/2017    CO2 22 (L) 07/17/2017    BUN 66 (H) 07/17/2017    CREATININE 6.0 (H) 07/17/2017     (HH) 07/17/2017    CALCIUM 9.1 07/17/2017    MG 1.8 04/19/2017    PHOS 5.1 (H) 04/19/2017     LFTs: Lab Results   Component Value Date    PROT 6.3 07/17/2017    ALBUMIN 3.3 (L) 07/17/2017    BILITOT 0.5 07/17/2017    AST 60 (H) 07/17/2017    ALKPHOS 189 (H) 07/17/2017    ALT 81 (H) 07/17/2017     Coags:   Lab Results   Component Value Date    INR 1.1 03/07/2017     FLP: Lab Results   Component Value Date    CHOL 79 (L) 03/07/2017    HDL 41 03/07/2017    LDLCALC 28.6 (L) 03/07/2017    TRIG 47 03/07/2017    CHOLHDL 51.9 (H) 03/07/2017     DM: Lab Results   Component Value Date    HGBA1C 14.7 (H) 03/08/2017    HGBA1C 14.5 (H) 03/07/2017    HGBA1C 10.6 (H) 03/14/2016    LDLCALC 28.6 (L) 03/07/2017    CREATININE 6.0 (H) 07/17/2017     Thyroid: Lab Results   Component Value Date    TSH 1.300 04/19/2017    FREET4 0.86 03/08/2017    M8BIZZW 3.3 (L) 12/02/2014    P3IVGUH 112 07/25/2006     Anemia: Lab Results   Component Value Date    IRON 46 04/19/2017    TIBC 186 (L) 04/19/2017    FERRITIN 1,866 (H) 04/19/2017     Cardiac: Lab Results   Component Value Date    TROPONINI 4.035 (H) 03/08/2017    CKTOTAL 618 (H) 11/26/2014    CKMB 2.1 11/26/2014     (H) 08/18/2015     Urinalysis: Lab Results   Component Value Date    LABURIN No growth 11/28/2014    COLORU Yellow 07/17/2017    SPECGRAV <=1.005 (A) 07/17/2017    NITRITE Negative 07/17/2017    KETONESU Negative 07/17/2017    UROBILINOGEN Negative 07/17/2017    WBCUA 4 07/17/2017       Trended Lab Data:    Recent Labs  Lab 07/17/17  0730   WBC 6.90   HGB  11.7*   HCT 38.0*   *   MCV 83   RDW 15.9*   *   K 6.4*   CL 89*   CO2 22*   BUN 66*   CREATININE 6.0*   *   PROT 6.3   ALBUMIN 3.3*   BILITOT 0.5   AST 60*   ALKPHOS 189*   ALT 81*       Trended Cardiac Data:  No results for input(s): TROPONINI, CKTOTAL, CKMB, BNP in the last 168 hours.    Microbiology Data:  None    Other Results:  EKG (my interpretation): no peaked T waves, no evidence of acute ischemia    Radiology:  Imaging Results          X-Ray Chest 1 View (Final result)  Result time 07/17/17 07:45:42    Final result by Mitchell Galarza MD (07/17/17 07:45:42)                 Impression:        As above.      Electronically signed by: MITCHELL GALARZA  Date:     07/17/17  Time:    07:45              Narrative:    Comparison:4/19/2017    Technique: Single AP portable chest radiograph.    Findings:     Cardiac monitoring leads overlie the chest. There are postsurgical changes of prior median sternotomy. Surgical clips are identified at the base of the neck. The cardiomediastinal silhouette is enlarged, similar to prior examination. There are increased interstitial markings bilaterally with bilateral perihilar groundglass opacities suggestive of edema. No new focal air space consolidation is appreciated. There is loculated pleural fluid versus pleural thickening with associated atelectasis again identified at the right lung base. There is no evidence of pneumothorax. Visualized osseous structures appear intact.                               Assessment:     Williams Bland is a 67 y.o. male with:  Patient Active Problem List    Diagnosis Date Noted    Weakness 07/17/2017    Abrasion of left lower extremity 07/17/2017    Hyperglycemia without ketosis 04/19/2017    Fluid overload 04/19/2017    Thrombocytopenia 04/19/2017    Hypertensive emergency 04/19/2017    Acute on chronic systolic heart failure 04/19/2017    Pulmonary hypertension 04/19/2017    NSTEMI (non-ST elevated myocardial  infarction) 03/07/2017    Personal history of noncompliance with medical treatment, presenting hazards to health 03/07/2017    Gangrene of toe 03/14/2016    ESRD on hemodialysis     Essential hypertension     Abrasion foot/toe     Coronary artery disease involving coronary bypass graft of native heart without angina pectoris 08/29/2015    Volume overload 08/29/2015    Mitral valve replaced 03/26/2015    Anemia 12/08/2014    Hyperlipidemia LDL goal <70 09/18/2014    Hypothyroid 09/18/2014    Uncontrolled type 2 diabetes mellitus with chronic kidney disease on chronic dialysis, with long-term current use of insulin 06/02/2014    Mitral regurgitation 05/08/2014    CAD (coronary artery disease) 05/08/2014    Cerebrovascular disease 05/08/2014   .     Plan:     Hyperkalemia 2/2 CKD-V  -K 6.4 on admit without EKG changes  -Given regular insulin 10 and albuterol neb in ED  -Dialyzed 7/17, net -1500 cc  -F/u repeat BMP    CKD-V with volume overload  -HD MWF at [facility] on [address]?  -Bibasilar crackles on exam  -CXR: increased interstitial markings bilaterally with bilateral perihilar ground glass opacities suggestive of edema  -Dialyzed 7/17, net -1500 cc  -F/u repeat CMP, Mg, Phos  -Consulted nephrology, f/u recs  -Continue home Sevelamer, Lasix 80 Tu/Th/Sat/Sun    Left leg weakness associated with fall  -Resulted in abrasion of left LE  -On exam, 3/5 strength of left LE, no sensory deficits, 4 small abrasions without s/s of infection  -Continue to monitor    DMII, uncontrolled, with renal complications, neuropathy, and right BKA   -Glucose 949 on admit  -ABG 7.341 / 45.6 / 86 / 24.6  -Beta hydroxybutyrate 0.3, UA without ketones  -Calculated plasma osmolarity 318  -No concern for DKA or HHS at this time  -3/8/17 HbA1c 14.7  -Likely noncompliance with medications and diet  -Will continue home insulin regimen: Aspart 5 units TID with meals, Levemir 10 units qHS  -Continue home Lyrica for neuropathic  pain  -SSI/accuchecks    Essential HTN  -On admit, /73  -Continue home Norvasc, Imdur, Lisinopril, Coreg  -Continue to monitor    Normocytic anemia, likely AoCD  -On admit, H/H 11.7/38  -4/19/17 workup: ferritin 1866, TIBC 186  -No bleeding or acute issues, continue to monitor  -F/u folate, B12    Transaminitis  -On admit, AST 60, ALT 81, Alk phos 189  -Could consider hepatitis panel, acetaminophen level    Thrombocytopenia  -On admit, plt 116 (unknown bl, in 4/2016 300s)  -No signs of bleeding, will monitor    Chronic HFrEF  -3/2017 ECHO EF 35%, mod-severe TR, pulm HTN (estimated PA 88)  -Continue home Coreg 25, Lasix 80 Tu/Th/Sat/Sun, Imdur 60, Lisinopril 20    CAD with hx of NSTEMI/stent placement  -3/2017 [facility] with LCX 70% stenosis, prox/mid RCA 80% stenosis  -Continue home ASA 81, Plavix 75, Lipitor 40    Hx of CVA  -Patient reports residual right hand  weakness, not noted on exam  -LDL goal <70  3/2017 cholesterol 79, HDL 41, LDL 28.6, trigly 47  -Continue home ASA 81, Lipitor 40    Hypothyroidism  -4/19/17 TSH 1.3, fT4 0.86  -Continue home Synthroid 125    GERD  -Continue home Protonix 40    Irritable bowel syndrome-diarrhea?  -Per pt, hx of diarrhea after abdominal surgery  -Unable to find chart history of abdominal surgery  -Will continue home medication, Lubiprostone    Diet: renal  Ppx: heparin  Dispo: pending dialysis, glucose control    Code Status:     Full    Amira Mills  [facility] Internal Medicine HO-I  [facility] Internal Medicine Service    [facility] Medicine Hospitalist Pager numbers:   [facility] Hospitalist Medicine Team A (Krista/Patrizia): [phone]  [facility] Hospitalist Medicine Team B (Ivette/Kathi):  [phone]

## 2017-07-17 NOTE — ED NOTES
"2 pt identifiers for Williams Bland verified and correct.     Pt presents to ED via EMS from home with c/o bilateral leg weakness and fatigue. Per EMS, pt c/o weakness for unknown amount of time. Pt states he did fall this morning bc he lost his balance. Denies LOC or head trauma. Two dressed skin tears noted to lower left leg. Pt dialysis M/W/F and was supposed to go this AM but "didn't feel well enough to go." last dialysis was Friday. Pt has no other complaints at this time. Pt AAO. Right BKA also noted with prosthetic leg. Pt placed on cardiac monitor, bp cuff and continuous pulse ox.   "

## 2017-07-17 NOTE — ED NOTES
Report received from dialysis nurse. Pt has not had anything to eat while there. Also, pt did not receive any medications. No accuchecks performed.

## 2017-07-17 NOTE — ED PROVIDER NOTES
Encounter Date: 7/17/2017       History     Chief Complaint   Patient presents with    Fatigue     felt weak this am while getting into his car this am on his way to dialysis     CHIEF COMPLAINT: Weak on the way to dialysis this morning     HISTORY OF PRESENT ILLNESS: This is a 67-year-old -American male presents to the emergency department stay because he got weak in the knees on his way to dialysis this morning.  He actually fell scraping his shin.  He did not his head.  No loss of consciousness.  No vomiting after the event.  His last dialysis session was on Friday.  The patient denies any chest pain, shortness of breath, palpitations, nausea or vomiting.  He is eating appropriately at home.  He is on a liquid limitation.  He's had no cough or sputum production.  No fever, chills or sweats. He takes oxycontin for pain. Denies ETOH.      REVIEW OF SYSTEMS:  Constitutional: No fever, no chills.  Eyes: No discharge. No pain.  HENT: No nasal drainage. No ear ache. No sore throat.  Cardiovascular: No chest pain, no palpitations.  Respiratory: No cough, no shortness of breath.  Gastrointestinal: No abdominal pain, no vomiting. No diarrhea.  Genitourinary: No hematuria, dysuria, urgency.  Musculoskeletal: No back pain.  Skin: No rashes.  Neurological: No headache, no focal weakness.    ALLERGIES reviewed  Family history reviewed  Home medications reviewed  Problem list reviewed    The history is provided by the patient           Review of patient's allergies indicates:  No Known Allergies  Past Medical History:   Diagnosis Date    CHF (congestive heart failure)     Coronary artery disease     CVA (cerebral vascular accident) 2013    Diabetes mellitus     ESRD (end stage renal disease)     Hypertension     Stroke      Past Surgical History:   Procedure Laterality Date    CARDIAC SURGERY      PTCA WITH STENT PLACEMENT    THYROID SURGERY       Family History   Problem Relation Age of Onset    Hypertension  "Father      Social History   Substance Use Topics    Smoking status: Former Smoker     Quit date: 9/22/2000    Smokeless tobacco: Never Used    Alcohol use No     Review of Systems   All other systems reviewed and are negative.      Physical Exam     Initial Vitals [07/17/17 0641]   BP Pulse Resp Temp SpO2   (!) 148/73 (!) 58 20 98.1 °F (36.7 °C) 99 %      MAP       98         Physical Exam    Nursing note and vitals reviewed.  Constitutional: He appears well-developed and well-nourished. He is not diaphoretic. No distress.   Sleepy but arousable, he reports being tired "didn't sleep well last night"   HENT:   Head: Normocephalic and atraumatic.   Nose: Nose normal.   Oropharynx clear, MM tacky   Eyes: Conjunctivae and EOM are normal. No scleral icterus.   Pinpoint pupils.    Neck: Normal range of motion. Neck supple. No JVD present.   Cardiovascular: Normal rate, regular rhythm and normal heart sounds. Exam reveals no gallop and no friction rub.    Pulmonary/Chest: Breath sounds normal. No stridor. No respiratory distress. He has no wheezes. He exhibits no tenderness.   Abdominal: Soft. Bowel sounds are normal. He exhibits no distension and no mass. There is no tenderness. There is no rebound and no guarding.   Musculoskeletal:   Dialysis access in the left upper extremity positive thrill.  Full range of motion to the upper extremities.    A right below the knee amputation with a prosthetic lower leg in place.    The left lower extremity has some abrasions noted along the shin.  See picture.  No erythema, warmth or drainage from the abrasions.  No active bleeding.  Positive nonpitting edema to the left ankle and pretibial area.  Full range of motion.   Lymphadenopathy:     He has no cervical adenopathy.   Neurological: He is alert and oriented to person, place, and time. He has normal strength. No cranial nerve deficit.   Skin: Skin is warm and dry. No rash noted. No pallor.   Psychiatric: He has a normal mood " and affect. Thought content normal.             ED Course   Procedures  Labs Reviewed   CBC W/ AUTO DIFFERENTIAL - Abnormal; Notable for the following:        Result Value    RBC 4.59 (*)     Hemoglobin 11.7 (*)     Hematocrit 38.0 (*)     MCH 25.5 (*)     MCHC 30.8 (*)     RDW 15.9 (*)     Platelets 116 (*)     Lymph # 0.7 (*)     Gran% 80.7 (*)     Lymph% 10.0 (*)     Platelet Estimate Decreased (*)     All other components within normal limits   COMPREHENSIVE METABOLIC PANEL - Abnormal; Notable for the following:     Sodium 121 (*)     Potassium 6.4 (*)     Chloride 89 (*)     CO2 22 (*)     Glucose 949 (*)     BUN, Bld 66 (*)     Creatinine 6.0 (*)     Albumin 3.3 (*)     Alkaline Phosphatase 189 (*)     AST 60 (*)     ALT 81 (*)     eGFR if  10 (*)     eGFR if non  9 (*)     All other components within normal limits    Narrative:      k and glu  critical result(s) called and verbal readback obtained   from coote rn, 07/17/2017 08:13   URINALYSIS - Abnormal; Notable for the following:     Specific Gravity, UA <=1.005 (*)     Protein, UA 2+ (*)     Glucose, UA 3+ (*)     Occult Blood UA 1+ (*)     All other components within normal limits   ISTAT PROCEDURE - Abnormal; Notable for the following:     POC PH 7.341 (*)     POC PCO2 45.6 (*)     All other components within normal limits   DRUG SCREEN PANEL, URINE EMERGENCY   BETA - HYDROXYBUTYRATE, SERUM   URINALYSIS MICROSCOPIC     EKG Readings: (Independently Interpreted)   56 bpm, normal axis, LVH, no ST elevations, no peaked T waves.  Abnormal EKG       X-Rays: Other Radiology Reports: Images reviewed by myself, read by radiology,     Imaging Results          X-Ray Chest 1 View (Final result)  Result time 07/17/17 07:45:42    Final result by Mitchell Galarza MD (07/17/17 07:45:42)                 Impression:        As above.      Electronically signed by: MITCHELL GALARZA  Date:     07/17/17  Time:    07:45              Narrative:     Comparison:4/19/2017    Technique: Single AP portable chest radiograph.    Findings:     Cardiac monitoring leads overlie the chest. There are postsurgical changes of prior median sternotomy. Surgical clips are identified at the base of the neck. The cardiomediastinal silhouette is enlarged, similar to prior examination. There are increased interstitial markings bilaterally with bilateral perihilar groundglass opacities suggestive of edema. No new focal air space consolidation is appreciated. There is loculated pleural fluid versus pleural thickening with associated atelectasis again identified at the right lung base. There is no evidence of pneumothorax. Visualized osseous structures appear intact.                                                ED Course   0730 The pt does have pinpoint pupils but he is readily arousable, sats are appropriate on RA. We will continue to watch and if needed give narcan.     0819 Wounds have been dressed.Lab has notified us as to his high K and high glucose. The pt will need to have dialysis, we will speak with Dr Lackey, if delay will need treatment for his HyperK while waiting for dialysis, add on a beta hydroxybutyrate and get an ABG.  No tall narrow peaked T waves in EKG which was obtained at 0728 and reviewed by myself at 0730.     0820 I spoke with Dr SKYLER Dennison, she will arrange for dialysis, if there is a delay in dialysis we can give the pt Calcium gluconate, albuterol    0825 Dialysis ready for pt. We do not have the Calcium gluconate from pharmacy yet, nor is resp here to give the treatment. If arrives before transport to dialysis will give but pt is going to attain correction in dialysis.     0839 I spoke with Dr Acharya re:the pts presentation and they will evaluate.     Clinical Impression:   The primary encounter diagnosis was Hyperglycemia. Diagnoses of Weakness, Serum potassium elevated, and ESRD (end stage renal disease) on dialysis were also pertinent to this  visit.                           Maxx Robles MD  07/17/17 0912       Maxx Robles MD  07/17/17 0917

## 2017-07-17 NOTE — PLAN OF CARE
Problem: Hemodialysis (Adult)  Goal: Signs and Symptoms of Listed Potential Problems Will be Absent, Minimized or Managed (Hemodialysis)  Signs and symptoms of listed potential problems will be absent, minimized or managed by discharge/transition of care (reference Hemodialysis (Adult) CPG).   Outcome: Ongoing (interventions implemented as appropriate)   07/17/17 1204   Hemodialysis   Problems Assessed (Hemodialysis) all   Problems Present (Hemodialysis) electrolyte imbalance;fluid imbalance   HD with Uf x 3h today. POC reviewed with pt.

## 2017-07-17 NOTE — PLAN OF CARE
Problem: Physical Therapy Goal  Goal: Physical Therapy Goal  Goals to be met by: 17     Patient will increase functional independence with mobility by performin. Supine <> sit with [place]  2. Sit to stand transfer with Modified [place]  3. Bed to chair transfer with Modified [place] using Rolling Walker  4. Gait  x 150 feet with Modified [place] using Rolling Walker.     Outcome: Ongoing (interventions implemented as appropriate)  PT evaluation completed today and pt will benefit from continued skilled PT.

## 2017-07-17 NOTE — ED NOTES
Dialysis contaced. Will put in transport. Per MD, ok to hold on meds since pt is going to dialysis asap.

## 2017-07-18 PROBLEM — R53.81 PHYSICAL DECONDITIONING: Status: ACTIVE | Noted: 2017-01-01

## 2017-07-18 NOTE — PLAN OF CARE
Pt transferred to room 456 via stretcher from ER, pt AAOx4, VSS, NAD noted, tele applied, fall and safety precautions in place, pt oriented to room and call bell, bed alarm set, plan of care reviewed with pt, will continue to monitor pt status.

## 2017-07-18 NOTE — PLAN OF CARE
Patient discharged to home.  No HH or HME.  Patient aware of priority care clinic appointment, pamphlet given.    Future Appointments  Date Time Provider Department Center   7/27/2017 9:00 AM Ayala Palma MD [facility] [place] [facility]     Follow-up With  Details  Why  Contact Info   Ayala Palma MD  On 7/27/2017  labs at 8:30 am, clinic at 9:00 am. This is the [facility].  [address]  [address]  [place] [address]  [phone]        07/18/17 1037   Final Note   Assessment Type Final Discharge Note   Discharge Disposition Home   Discharge planning education complete? Yes   Hospital Follow Up  Appt(s) scheduled? Yes   Discharge plans and expectations educations in teach back method with documentation complete? Yes   Offered [facility] Bedside Delivery? Yes   Discharge/Hospital Encounter Summary to (non-[facility]) PCP Yes   Referral to Outpatient Case Management complete? n/a   Referral to / orders for Home Health Complete? n/a   30 day supply of medicines given at discharge, if documented non-compliance / non-adherence? No   Any social issues identified prior to discharge? No   Did you assess the readiness or willingness of the family or caregiver to support self management of care? Yes   Right Care Referral Info   Post Acute Recommendation No Care     Laurence Sims RN  Transition Navigator  [phone]

## 2017-07-18 NOTE — PLAN OF CARE
Problem: Occupational Therapy Goal  Goal: Occupational Therapy Goal  Goals to be met by: 8/17/17     Patient will increase functional independence with ADLs by performing:    LE Dressing with Modified [place].  Grooming while standing with Modified [place].  Toileting from toilet with Modified [place] for hygiene and clothing management.   Stand pivot transfers with Modified [place].  Toilet transfer to toilet with Modified [place].  Increased functional strength to WFL for self care skills and functional mobility.  Upper extremity exercise program x10 reps per handout, with independence.     Outcome: Ongoing (interventions implemented as appropriate)  Williams Bland is a 67 y.o. male with a medical diagnosis of ESRD on hemodialysis and presents with R sided weakness, and decreased overall strength, endurance, balance and [place] and safety with ADL's and fx mobility. Pt tolerated tx well with no complaints. Continue OT services to address functional goals    ILEANA Pereira/JARROD

## 2017-07-18 NOTE — DISCHARGE SUMMARY
[facility] Internal Medicine Discharge Summary    Primary Team: [facility] Internal Medicine Team A  Attending Physician: Jhoana Acharya MD  Resident: Mary Rosas  Intern: Amira Mills    Date of Admit: 7/17/2017  Date of Discharge: 7/18/2017    Discharge to: Home  Condition: Stable    Discharge Diagnoses     Patient Active Problem List   Diagnosis    Mitral regurgitation    CAD (coronary artery disease)    Cerebrovascular disease    Uncontrolled type 2 diabetes mellitus with chronic kidney disease on chronic dialysis, with long-term current use of insulin    Hyperlipidemia LDL goal <70    Hypothyroid    Anemia    Mitral valve replaced    Coronary artery disease involving coronary bypass graft of native heart without angina pectoris    Volume overload    Serum potassium elevated    ESRD on hemodialysis    Essential hypertension    Abrasion foot/toe    Gangrene of toe    NSTEMI (non-ST elevated myocardial infarction)    Personal history of noncompliance with medical treatment, presenting hazards to health    Hyperglycemia    Fluid overload    Thrombocytopenia    Hypertensive emergency    Acute on chronic systolic heart failure    Pulmonary hypertension    Weakness    Abrasion of left lower extremity    Physical deconditioning       Consultants and Procedures     Consultants:  Nephrology  PT/OT    Procedures:   Hemodialysis    Brief History of Present Illness      Williams Bland is a 67 y.o. Male with a PMHx of HFrEF, CAD, CVA (2013), DM2 uncontrolled, CKD-V on HD (makes urine, HD via left arm fistula on MWF at [facility] on [address]?), and HTN who presented to the ED with leg weakness x 4 days. Patient reports he was on his way to dialysis on the morning of admit when his legs felt very weak/slightly numb, causing him to fall. Did scrape his left knee and left shin, but denies pain. Denies hitting his head, loss of conscious, dizziness or nausea/vomiting prior to fall. Reports his last dialysis was Friday  prior to admit. Reports he missed his Tuesday session (but patient is scheduled MWF). Reports he never misses any medication (but patient has chart history of non-compliance). States his diet is full of meat, low salt, and some candy.      Patient reports some right arm weakness for the past few months causing him to drop things. Also reports some constipation (last BM 5 days ago).     Denies chest pain, shortness of breath, weight gain, weight loss, LE edema, increasing abdominal girth. Denies F/C/N/V. Denies recent illness.    For the full HPI please refer to the History & Physical from this admission.    Hospital Course By Problem with Pertinent Findings     1. Hyperkalemia 2/2 CKD-V (resolved with dialysis)  -K 6.4 on admit without EKG changes  -Given regular insulin 10 and albuterol neb in ED  -Dialyzed 7/17 1500 cc  -K 4.7 on morning of discharge     2. CKD-V with volume overload  -HD MWF at [facility] on [address]  -Bibasilar crackles on exam  -CXR: increased interstitial markings bilaterally with bilateral perihilar ground glass opacities suggestive of edema  -Dialyzed 7/17 1500 cc  -Continued home Sevelamer 800 TID, Lasix 80 mg Tu/Th/Sat/Sun on discharge     3. Left leg weakness associated with fall  -Resulted in abrasion of left LE  -On exam, 3/5 strength of left LE, no sensory deficits, 4 small abrasions without s/s of infection  -Weakness improved after dialysis, per patient     4. DMII, uncontrolled, with renal complications, neuropathy, and right BKA   -Glucose 949 on admit  -ABG 7.341 / 45.6 / 86 / 24.6  -Beta hydroxybutyrate 0.3, UA without ketones  -Calculated plasma osmolarity 318  -No concern for DKA or HHS during inpatient stay  -3/8/17 HbA1c 14.7  -Likely noncompliant with medications and diet  -Home insulin regiment, SSI/accuchecks while inpatient  -Continued home insulin regimen on discharge: Aspart 5 units TID with meals, Levemir 10 units qHS  -Continued home Lyrica for neuropathic pain on  discharge  -Encouraged compliance with medications and diet; patient expressed understanding     5. Essential HTN  -Stable throughout inpatient stay  -Continued home regimen on discharge: Norvasc 10, Imdur 60, Lisinopril 20   -Decreased home Coreg to 3.125 BID 2/2 persistent bradycardia during inpatient stay     6. Normocytic anemia, likely AoCD  -On admit, H/H 11.7/38  -4/19/17 workup: ferritin 1866, TIBC 186  -Had no acute issues     7. Transaminitis  -On admit, AST 60, ALT 81, Alk phos 189  -Decreased to AST 51, ALT 58 after HD and prior to discharge  -Likely 2/2 renal disease     8. Thrombocytopenia  -On admit, plt 116 (unknown bl, in 4/2016 300s)  -No signs of bleeding or acute issues while inpatient     9. Chronic HFrEF  -3/2017 ECHO EF 35%, mod-severe TR, pulm HTN (estimated PA 88)  -Continued home Lasix 80 Tu/Th/Sat/Sun, Imdur 60, Lisinopril 20 on discharge  -Decreased home Coreg to 3.125 mg BID on discharge 2/2 persistent bradycardia while inpatient     10. CAD with hx of NSTEMI/stent placement  -3/2017 [facility] with LCX 70% stenosis, prox/mid RCA 80% stenosis  -Continued home ASA 81, Plavix 75, Lipitor 40 on discharge     11. Hx of CVA  -Patient reported residual right hand  weakness, not noted on exam  -LDL goal <70  3/2017 cholesterol 79, HDL 41, LDL 28.6, trigly 47  -Continued home ASA 81, Lipitor 40 on discharge     12. Hypothyroidism  -4/19/17 TSH 1.3, fT4 0.86  -Continued home Synthroid 125     13. GERD  -Continued home Protonix 40 on discharge     14. Irritable bowel syndrome-diarrhea?  -Per pt, hx of diarrhea   -No acute issues while inpatient  -Continued home medication Lubiprostone on discharge     15. Deconditioning  -PT/OT evaluated patient: no home needs    Discharge Medications        Medication List      CHANGE how you take these medications    amlodipine 10 MG tablet  Commonly known as:  NORVASC  Take 1 tablet (10 mg total) by mouth once daily.  What changed:  how much to take      carvedilol 3.125 MG tablet  Commonly known as:  COREG  Take 1 tablet (3.125 mg total) by mouth 2 (two) times daily with meals.  What changed:  · medication strength  · how much to take     furosemide 80 MG tablet  Commonly known as:  LASIX  Take 1 tablet (80 mg total) by mouth once daily.  What changed:  · when to take this  · additional instructions     sevelamer carbonate 800 mg Tab  Commonly known as:  RENVELA  Take 1 tablet (800 mg total) by mouth 3 (three) times daily with meals.  What changed:  how much to take        CONTINUE taking these medications    albuterol sulfate 2.5 mg/0.5 mL Nebu  Take 2.5 mg by nebulization every 8 (eight) hours.     allopurinol 100 MG tablet  Commonly known as:  ZYLOPRIM     aspirin 81 MG EC tablet  Commonly known as:  ECOTRIN  Take 1 tablet (81 mg total) by mouth once daily.     atorvastatin 40 MG tablet  Commonly known as:  LIPITOR  Take 1 tablet (40 mg total) by mouth once daily.     clopidogrel 75 mg tablet  Commonly known as:  PLAVIX  Take 1 tablet (75 mg total) by mouth once daily.     collagenase ointment  Apply topically once daily.     CONTOUR TEST STRIPS Strp  Generic drug:  blood sugar diagnostic     ENSURE HIGH PROTEIN Powd  Generic drug:  protein     hydrocodone-acetaminophen 5-325mg 5-325 mg per tablet  Commonly known as:  NORCO  Take 1 tablet by mouth every 6 (six) hours as needed for Pain.     insulin aspart 100 unit/mL Inpn pen  Commonly known as:  NovoLOG  Inject 5 Units into the skin 3 (three) times daily with meals.     insulin detemir 100 unit/mL (3 mL) Inpn pen  Commonly known as:  LEVEMIR FLEXTOUCH  Inject 10 Units into the skin every evening.     isosorbide mononitrate 60 MG 24 hr tablet  Commonly known as:  IMDUR  Take 1 tablet (60 mg total) by mouth once daily.     levothyroxine 125 MCG tablet  Commonly known as:  SYNTHROID     lisinopril 20 MG tablet  Commonly known as:  PRINIVIL,ZESTRIL  Take 1 tablet (20 mg total) by mouth once daily.      lubiprostone 8 MCG Cap  Commonly known as:  AMITIZA     miconazole 2 % cream  Commonly known as:  MICOTIN  Apply topically 2 (two) times daily.     oxycodone-acetaminophen  mg per tablet  Commonly known as:  PERCOCET     pantoprazole 40 MG tablet  Commonly known as:  PROTONIX  Take 1 tablet by mouth daily.     pregabalin 75 MG capsule  Commonly known as:  LYRICA     silver sulfADIAZINE 1% 1 % cream  Commonly known as:  SILVADENE  Apply 1 application topically 2 (two) times daily.     trazodone 100 MG tablet  Commonly known as:  DESYREL  Take 1 tablet (100 mg total) by mouth every evening.           Where to Get Your Medications      These medications were sent to [facility] - [facility] - [address]  [address] [place] [address]    Phone:  [phone]   · sevelamer carbonate 800 mg Tab     You can get these medications from any pharmacy    Bring a paper prescription for each of these medications  · amlodipine 10 MG tablet  · carvedilol 3.125 MG tablet  · clopidogrel 75 mg tablet  · lisinopril 20 MG tablet         Discharge Information:     Diet:  Cardiac, diabetic, renal    Physical Activity:  As tolerated    Instructions:  1. Take all medications as prescribed  2. Keep all follow-up appointments  3. Return to the hospital or call your primary care physicians if any worsening symptoms such as chest pain, shortness of breath, severe pain, or any other concerns occur.    Follow-Up Appointments:  1. Follow up with PCP in 1-2 weeks. Pt desired priority care clinic. Scheduled with Dr. Palma on 7/27/17 at 9am.  2. Follow up with Nephrology at [facility].    Amira Mills MD  \[facility] [place] [facility]\"" Internal Medicine, [facility]

## 2017-07-18 NOTE — PLAN OF CARE
Problem: Patient Care Overview  Goal: Plan of Care Review  Outcome: Ongoing (interventions implemented as appropriate)  Pt stable. NO distress noted. POC reviewed with pt. Pt verbalized understanding. Pt remains SB on the monitor. NO true red alarms noted. Pt denied pain. Fall precautions maintained. Bed in lowest position, call light in reach and bed alarm on.

## 2017-07-18 NOTE — PROGRESS NOTES
Progress Note  Nephrology      Consult Requested By: Jhoana Acharya MD      SUBJECTIVE:     Overnight events  Patient is a 67 y.o. male     Patient Active Problem List   Diagnosis    Mitral regurgitation    CAD (coronary artery disease)    Cerebrovascular disease    Uncontrolled type 2 diabetes mellitus with chronic kidney disease on chronic dialysis, with long-term current use of insulin    Hyperlipidemia LDL goal <70    Hypothyroid    Anemia    Mitral valve replaced    Coronary artery disease involving coronary bypass graft of native heart without angina pectoris    Volume overload    Serum potassium elevated    ESRD on hemodialysis    Essential hypertension    Abrasion foot/toe    Gangrene of toe    NSTEMI (non-ST elevated myocardial infarction)    Personal history of noncompliance with medical treatment, presenting hazards to health    Hyperglycemia    Fluid overload    Thrombocytopenia    Hypertensive emergency    Acute on chronic systolic heart failure    Pulmonary hypertension    Weakness    Abrasion of left lower extremity    Physical deconditioning     Past Medical History:   Diagnosis Date    CHF (congestive heart failure)     Coronary artery disease     CVA (cerebral vascular accident) 2013    Diabetes mellitus     ESRD (end stage renal disease)     Hypertension     Stroke               OBJECTIVE:     Vitals:    07/18/17 0726 07/18/17 0800 07/18/17 1127 07/18/17 1200   BP:  131/60  (!) 109/55   BP Location:  Right arm  Right arm   Patient Position:  Lying  Sitting   BP Method:  Automatic  Automatic   Pulse:  (!) 58  60   Resp:  18  20   Temp:  99.1 °F (37.3 °C)  98.7 °F (37.1 °C)   TempSrc:  Oral  Oral   SpO2: (!) 92%  96%    Weight:       Height:           Temp: 98.7 °F (37.1 °C) (07/18/17 1200)  Pulse: 60 (07/18/17 1200)  Resp: 20 (07/18/17 1200)  BP: (!) 109/55 (07/18/17 1200)  SpO2: 96 % (07/18/17 1127)      Date 07/18/17 0700 - 07/19/17 0659   Shift 1175-6464  8363-6942 7543-7725 24 Hour Total   I  N  T  A  K  E   P.O. 430   430    Shift Total  (mL/kg) 430  (5.8)   430  (5.8)   O  U  T  P  U  T   Urine  (mL/kg/hr) 125   125    Shift Total  (mL/kg) 125  (1.7)   125  (1.7)   Weight (kg) 73.5 73.5 73.5 73.5             Medications:   amlodipine  10 mg Oral Daily    aspirin  81 mg Oral Daily    atorvastatin  40 mg Oral Daily    carvedilol  3.125 mg Oral BID    clopidogrel  75 mg Oral Daily    furosemide  80 mg Oral Daily    heparin (porcine)  5,000 Units Subcutaneous Q8H    insulin aspart  5 Units Subcutaneous TIDWM    insulin detemir  10 Units Subcutaneous QHS    isosorbide mononitrate  60 mg Oral Daily    levothyroxine  125 mcg Oral Before breakfast    lubiprostone  8 mcg Oral Daily with breakfast    pantoprazole  40 mg Oral Daily    sevelamer carbonate  800 mg Oral TID WM              Physical Exam:  General appearance:NAD  No SOB  Lungs: diminished breath sounds  Heart: Pulse 60  Abdomen: soft  Extremities: edema  Skin: dry    Laboratory:  ABG  Labs reviewed  Recent Results (from the past 336 hour(s))   Basic metabolic panel    Collection Time: 07/17/17  4:31 PM   Result Value Ref Range    Sodium 132 (L) 136 - 145 mmol/L    Potassium 5.1 3.5 - 5.1 mmol/L    Chloride 100 95 - 110 mmol/L    CO2 23 23 - 29 mmol/L    BUN, Bld 36 (H) 8 - 23 mg/dL    Creatinine 4.1 (H) 0.5 - 1.4 mg/dL    Calcium 9.0 8.7 - 10.5 mg/dL    Anion Gap 9 8 - 16 mmol/L     Recent Results (from the past 336 hour(s))   CBC auto differential    Collection Time: 07/18/17  5:01 AM   Result Value Ref Range    WBC 6.50 3.90 - 12.70 K/uL    Hemoglobin 11.5 (L) 14.0 - 18.0 g/dL    Hematocrit 35.8 (L) 40.0 - 54.0 %    Platelets 103 (L) 150 - 350 K/uL   CBC auto differential    Collection Time: 07/17/17  7:30 AM   Result Value Ref Range    WBC 6.90 3.90 - 12.70 K/uL    Hemoglobin 11.7 (L) 14.0 - 18.0 g/dL    Hematocrit 38.0 (L) 40.0 - 54.0 %    Platelets 116 (L) 150 - 350 K/uL     Urinalysis  No  results for input(s): COLORU, CLARITYU, SPECGRAV, PHUR, PROTEINUA, GLUCOSEU, BILIRUBINCON, BLOODU, WBCU, RBCU, BACTERIA, MUCUS, NITRITE, LEUKOCYTESUR, UROBILINOGEN, HYALINECASTS in the last 24 hours.    Diagnostic Results:  X-Ray: Reviewed  US: Reviewed  Echo: Reviewed  ACCESS    ASSESSMENT/PLAN:     ESRD  MBD  Anemia  Hb 11.5  Poor nutrition  Albumin 3.1  HD yesterday  UF 1500 cc  /60

## 2017-07-18 NOTE — PROGRESS NOTES
"[facility] Internal Medicine Resident HO-I Progress Note    Subjective:     Patient feels well this morning. Reports left leg weakness has improved. Was able to walk yesterday with walker and felt stronger. Reports mild head "tightness" in bilateral temples. Does not want Tylenol.     Objective:   Last 24 Hour Vital Signs:  BP  Min: 103/51  Max: 160/97  Temp  Av.6 °F (36.4 °C)  Min: 96.2 °F (35.7 °C)  Max: 99.1 °F (37.3 °C)  Pulse  Av.4  Min: 56  Max: 94  Resp  Av  Min: 16  Max: 20  SpO2  Av.3 %  Min: 92 %  Max: 100 %  Height  Av' 11" (180.3 cm)  Min: 5' 11" (180.3 cm)  Max: 5' 11" (180.3 cm)  Weight  Av.5 kg (162 lb 1.6 oz)  Min: 73.5 kg (162 lb 1.6 oz)  Max: 73.5 kg (162 lb 1.6 oz)  I/O last 3 completed shifts:  In: 1010 [P.O.:210; Other:800]  Out: 2300 [Other:2300]     After hemodialysis: net negative -1165 since hemodialysis yesterday a.m.     Physical Examination:  /60 (BP Location: Right arm, Patient Position: Lying, BP Method: Automatic)   Pulse (!) 58   Temp 99.1 °F (37.3 °C) (Oral)   Resp 18   Ht 5' 11" (1.803 m)   Wt 73.5 kg (162 lb 1.6 oz)   SpO2 (!) 92%   BMI 22.61 kg/m²       /66 (BP Location: Right arm, BP Method: Automatic)   Pulse 61   Temp 98.2 °F (36.8 °C) (Oral)   Resp 18   Ht 5' 11" (1.803 m)   Wt 77.1 kg (170 lb)   SpO2 98%   BMI 23.71 kg/m²   General appearance: alert, cooperative and no distress, eating breakfast during interview  Head: Normocephalic, without obvious abnormality, atraumatic  Eyes: PERLL, EOMI, anicteric sclera  Throat: lips, tongue normal; adequate dentition but missing several teeth; no pharyngeal exudate/erythema  Neck: no adenopathy, no carotid bruit and no JVD  Lungs: mild bibasilar crackles, no wheezes  Heart: regular rate and rhythm, S1, S2 normal, no murmur, click, rub or gallop    Abdomen: soft, non-tender, non-distended, normoactive BS  Pulses: 1+ b/l radial, 1+ dorsalis pedis on LLE  Extremities: below the knee " amputation of RLE; LLE: 4 linear, ~1 cm skin abrasions to knee and calf without surrounding erythema/induration or purulence; dressing C/D/I this a.m.; non-pitting edema to ankle of LLE; chronic nail changes to LLE toe-nails  Neuro: alert and oriented to person, place; sensation grossly intact throughout; 4/5 strength in bilateral UE; 3/5 strength in LLE    Laboratory:  Laboratory Data Reviewed: yes  Pertinent Findings:  7/18  CBC: WBC 6.5, H/H 11.5/35.8, MCV 78, Plt 103  CMP: K 4.7, BUN/Cr 40/4.4 (66/6 on admit)  Mg 1.9, Phos 3.2  Folate, B12 pending  HbA1c pending    7/17  BMP (after HD): Na 132, K 5.1, Glucose 501, BUN/Cr 36/4.1 (prior to HD 66/6.0)    Microbiology Data Reviewed: yes  Pertinent Findings:  None    Other Results:  EKG (my interpretation): No new    Radiology Data Reviewed: yes  Pertinent Findings:  No new    Current Medications:     Infusions:        Scheduled:   amlodipine  10 mg Oral Daily    aspirin  81 mg Oral Daily    atorvastatin  40 mg Oral Daily    carvedilol  3.125 mg Oral BID    clopidogrel  75 mg Oral Daily    furosemide  80 mg Oral Daily    heparin (porcine)  5,000 Units Subcutaneous Q8H    insulin aspart  5 Units Subcutaneous TIDWM    insulin detemir  10 Units Subcutaneous QHS    isosorbide mononitrate  60 mg Oral Daily    levothyroxine  125 mcg Oral Before breakfast    lubiprostone  8 mcg Oral Daily with breakfast    pantoprazole  40 mg Oral Daily    sevelamer carbonate  800 mg Oral TID WM        PRN:  dextrose 50%, dextrose 50%, glucagon (human recombinant), glucose, glucose, insulin aspart    Antibiotics and Day Number of Therapy:  None    Lines and Day Number of Therapy:  Right forearm PIV  Right antecubital PIV    Assessment:     Williams Bland is a 67 y.o.male with  Patient Active Problem List    Diagnosis Date Noted    Weakness 07/17/2017    Abrasion of left lower extremity 07/17/2017    Hyperglycemia without ketosis 04/19/2017    Fluid overload 04/19/2017     Thrombocytopenia 04/19/2017    Hypertensive emergency 04/19/2017    Acute on chronic systolic heart failure 04/19/2017    Pulmonary hypertension 04/19/2017    NSTEMI (non-ST elevated myocardial infarction) 03/07/2017    Personal history of noncompliance with medical treatment, presenting hazards to health 03/07/2017    Gangrene of toe 03/14/2016    ESRD on hemodialysis     Essential hypertension     Abrasion foot/toe     Coronary artery disease involving coronary bypass graft of native heart without angina pectoris 08/29/2015    Volume overload 08/29/2015    Mitral valve replaced 03/26/2015    Anemia 12/08/2014    Hyperlipidemia LDL goal <70 09/18/2014    Hypothyroid 09/18/2014    Uncontrolled type 2 diabetes mellitus with chronic kidney disease on chronic dialysis, with long-term current use of insulin 06/02/2014    Mitral regurgitation 05/08/2014    CAD (coronary artery disease) 05/08/2014    Cerebrovascular disease 05/08/2014        Plan:     Hyperkalemia 2/2 CKD-V (resolved with dialysis)  -K 6.4 on admit without EKG changes  -Given regular insulin 10 and albuterol neb in ED  -Dialyzed 7/17 1500 cc  -Repeat BMP after HD: K 5.1,  This a.m. K 4.7   -F/u morning BMPs  -Continue to monitor     CKD-V with volume overload  -HD MWF at [facility] on [address]?  -Bibasilar crackles on exam  -CXR: increased interstitial markings bilaterally with bilateral perihilar ground glass opacities suggestive of edema  -Dialyzed 7/17, net -1500 cc  -Consulted nephrology, f/u recs  -Continue home Sevelamer, Lasix 80 Tu/Th/Sat/Sun     Left leg weakness associated with fall  -Resulted in abrasion of left LE  -On exam, 3/5 strength of left LE, no sensory deficits, 4 small abrasions without s/s of infection  -Weakness improved this a.m.  -Continue to monitor    Constipation  -Last BM 5 days prior to admission  -Not complaining of abdominal pain or bloating  -Colace PRN     DMII, uncontrolled, with renal complications,  neuropathy, and right BKA   -Glucose 949 on admit  -ABG 7.341 / 45.6 / 86 / 24.6  -Beta hydroxybutyrate 0.3, UA without ketones  -Calculated plasma osmolarity 318  -No concern for DKA or HHS at this time  -3/8/17 HbA1c 14.7  -Likely noncompliance with medications and diet  -Will continue home insulin regimen: Aspart 5 units TID with meals, Levemir 10 units qHS  -Continue home Lyrica for neuropathic pain  -SSI/accuchecks  -Encouraged compliance with medications and diet; patient expressed understanding     Essential HTN  -On admit, /73  -Continue home Norvasc, Imdur, Lisinopril, Coreg  -Stable this a.m., 131/60     Normocytic anemia, likely AoCD  -On admit, H/H 11.7/38  -4/19/17 workup: ferritin 1866, TIBC 186  -No bleeding or acute issues, continue to monitor  -F/u folate, B12     Transaminitis  -On admit, AST 60, ALT 81, Alk phos 189  -Decreased to AST 51, ALT 58, Alk phos 150 this a.m.  -Could consider hepatitis panel if does not resolve     Thrombocytopenia  -On admit, plt 116 (unknown bl, in 4/2016 300s)  -No signs of bleeding, will monitor     Chronic HFrEF  -3/2017 ECHO EF 35%, mod-severe TR, pulm HTN (estimated PA 88)  -Continue home Coreg 25, Lasix 80 Tu/Th/Sat/Sun, Imdur 60, Lisinopril 20     CAD with hx of NSTEMI/stent placement  -3/2017 [facility] with LCX 70% stenosis, prox/mid RCA 80% stenosis  -Continue home ASA 81, Plavix 75, Lipitor 40     Hx of CVA  -Patient reports residual right hand  weakness, not noted on exam  -LDL goal <70  3/2017 cholesterol 79, HDL 41, LDL 28.6, trigly 47  -Continue home ASA 81, Lipitor 40     Hypothyroidism  -4/19/17 TSH 1.3, fT4 0.86  -Continue home Synthroid 125     GERD  -Continue home Protonix 40     Irritable bowel syndrome-diarrhea?  -Per pt, hx of diarrhea after abdominal surgery  -Unable to find chart history of abdominal surgery  -Will continue home medication, Lubiprostone     Deconditioning  -PT/OT evaluation: no needs    Diet: renal diabetic  Ppx:  heparin  Dispo: possible d/c today with  f/u PCP and nephrology/dialysis as scheduled    Amira Mills  [facility] Internal Medicine HO-I  [facility] Internal Medicine Service Team A    [facility] Medicine Hospitalist Pager numbers:   [facility] Hospitalist Medicine Team A (Krista/Patrizia): [phone]  [facility] Hospitalist Medicine Team B (Ivette/Kathi):  [phone]

## 2017-07-18 NOTE — PLAN OF CARE
notified pt has not has had BM 7/12, nurse requesting PRN med for if pt c/o of constipation, no new orders given at this time.

## 2017-07-18 NOTE — PT/OT/SLP PROGRESS
"Occupational Therapy  Treatment    Williams Bland   MRN: [id number]   Admitting Diagnosis: ESRD on hemodialysis    OT Date of Treatment: 07/18/17   OT Start Time: 1030  OT Stop Time: 1100  OT Total Time (min): 30 min    Billable Minutes:  Self Care/Home Management 20 and Therapeutic Exercise 10    General Precautions: Standard, fall  Orthopedic Precautions: N/A  Braces:           Subjective:  Communicated with RN prior to session.  "I think my stump is swole."    Pain/Comfort  Pain Rating 1: 0/10    Objective:  Patient found with: telemetry     Functional Mobility:  Bed Mobility:  Rolling/Turning to Left: Modified independent  Scooting/Bridging: Modified Independent  Supine to Sit: Modified Independent  Sit to Supine: Modified Independent    Transfers:   Sit <> Stand Assistance: Contact Guard Assistance  Sit <> Stand Assistive Device: No Assistive Device  Bed <> Chair Technique: Squat Pivot      Activities of Daily Living:     Grooming Position: Seated, EOB Pt declined standing at sink secondary to unable to christiano prosthetic.   Grooming Level of Assistance: Modified independent                  Balance:   Static Sit: GOOD: Takes MODERATE challenges from all directions  Dynamic Sit: GOOD: Maintains balance through MODERATE excursions of active trunk movement    Therapeutic Activities and Exercises:  After multiple attempts, pt unable to get RLE prosthetic locked in place. Pt reports he hasn't been wearing his wrap and thinks his stump my be swollen.   Pt noted to have R hand weakness and decreased FMC during G/H tasks. Therefore, pt provided with yel/green theraputty and handout, and performed exercises x 10 reps for gross grasp, tripod and lateral pinch, finger extensions, and molding. Pt also educated on different activities to perform at home to increase R hand strength, endurance and coordination,. Pt tolerated exercises well.     AM-PAC 6 CLICK ADL   How much help from another person does this patient currently need? " "  1 = Unable, Total/Dependent Assistance  2 = A lot, Maximum/Moderate Assistance  3 = A little, Minimum/Contact Guard/Supervision  4 = None, Modified [place]/Independent    Putting on and taking off regular lower body clothing? : 3  Bathing (including washing, rinsing, drying)?: 3  Toileting, which includes using toilet, bedpan, or urinal? : 3  Putting on and taking off regular upper body clothing?: 4  Taking care of personal grooming such as brushing teeth?: 4  Eating meals?: 4  Total Score: 21     AM-PAC Raw Score CMS "G-Code Modifier Level of Impairment Assistance   6 % Total / Unable   7 - 8 CM 80 - 100% Maximal Assist   9-13 CL 60 - 80% Moderate Assist   14 - 19 CK 40 - 60% Moderate Assist   20 - 22 CJ 20 - 40% Minimal Assist   23 CI 1-20% SBA / CGA   24 CH 0% Independent/ Mod I       Patient left up in chair with all lines intact, call button in reach and RN notified    ASSESSMENT:  Williams Bland is a 67 y.o. male with a medical diagnosis of ESRD on hemodialysis and presents with R sided weakness, and decreased overall strength, endurance, balance and [place] and safety with ADL's and fx mobility. Pt tolerated tx well with no complaints. Continue OT services to address functional goals        Rehab identified problem list/impairments: Rehab identified problem list/impairments: weakness, impaired endurance, impaired self care skills, impaired functional mobilty, gait instability, impaired balance    Rehab potential is good.    Activity tolerance: Good    Discharge recommendations:       Barriers to discharge:      Equipment recommendations:       GOALS:    Occupational Therapy Goals        Problem: Occupational Therapy Goal    Goal Priority Disciplines Outcome Interventions   Occupational Therapy Goal     OT, PT/OT Ongoing (interventions implemented as appropriate)    Description:  Goals to be met by: 8/17/17     Patient will increase functional independence with ADLs by performing:    LE " Dressing with Modified [place].  Grooming while standing with Modified [place].  Toileting from toilet with Modified [place] for hygiene and clothing management.   Stand pivot transfers with Modified [place].  Toilet transfer to toilet with Modified [place].  Increased functional strength to WFL for self care skills and functional mobility.  Upper extremity exercise program x10 reps per handout, with independence.                      Plan:  Patient to be seen 5 x/week to address the above listed problems via self-care/home management, therapeutic activities, therapeutic exercises  Plan of Care expires: 08/17/17  Plan of Care reviewed with: patient         VICKY Pereira  07/18/2017

## 2017-07-18 NOTE — PLAN OF CARE
Problem: Physical Therapy Goal  Goal: Physical Therapy Goal  Goals to be met by: 17     Patient will increase functional independence with mobility by performin. Supine <> sit with [place]  2. Sit to stand transfer with Modified [place]  3. Bed to chair transfer with Modified [place] using Rolling Walker  4. Gait  x 150 feet with Modified [place] using Rolling Walker.      Outcome: Ongoing (interventions implemented as appropriate)  Continue working toward goals.

## 2017-07-18 NOTE — PLAN OF CARE
TN met with patient.  Patient lives with spouse, Robyn.  Still drives, has oxygen, glucometer, and rolling walker at home.  [facility] [address] [facility] [place].   Patient interested [facility]t.       07/18/17 1029   Discharge Assessment   Assessment Type Discharge Planning Assessment   Confirmed/corrected address and phone number on facesheet? Yes   Assessment information obtained from? Patient   Type of Healthcare Directive Received Other (Comment)  (None)   Prior to hospitilization cognitive status: Alert/Oriented   Prior to hospitalization functional status: Assistive Equipment   Current cognitive status: Alert/Oriented   Current Functional Status: Assistive Equipment   Arrived From home or self-care   Lives With spouse   Able to Return to Prior Arrangements yes   Is patient able to care for self after discharge? Yes   Patient's perception of discharge disposition home or selfcare   Readmission Within The Last 30 Days no previous admission in last 30 days   Patient currently receives home health services? No   Does the patient currently use HME? Yes   Equipment Currently Used at Home glucometer;walker, rolling;oxygen   Do you have any problems affording any of your prescribed medications? No   Is the patient taking medications as prescribed? yes   Does the patient have transportation to healthcare appointments? Yes   Transportation Available car   On Dialysis? Yes   If yes, what is the name of the dialysis unit? [facility] MWF   Does the patient receive outpatient dialysis? Yes   Does the patient receive services at the [facility]? No   Discharge Plan A Home with family   Discharge Plan B Home with family;Home Health   Patient/Family In Agreement With Plan yes     Laurence Sims RN  Transition Navigator  [phone]

## 2017-07-18 NOTE — PT/OT/SLP PROGRESS
"Physical Therapy  Treatment    Williams Bland   MRN: [id number]   Admitting Diagnosis: ESRD on hemodialysis    PT Received On: 07/18/17  PT Start Time: 1310     PT Stop Time: 1326    PT Total Time (min): 16 min       Billable Minutes:  Therapeutic Activity 16    Treatment Type: Treatment  PT/PTA: PTA     PTA Visit Number: 1       General Precautions: Standard,    Orthopedic Precautions:     Braces:      Do you have any cultural, spiritual, Gnosticist conflicts, given your current situation?: none reported to PT    Subjective:  Communicated with RN (Rosie) prior to session.  Pt sitting up in b/s chair sleeping upon entering the room.  Pt stated "You are back again."  PTA explained to pt that she was with physical therapy and not occupational therapy whom had seem him earlier. Pt stated "I am tired." Pt agreed to tx.    Pain/Comfort  Pain Rating 1: 0/10  Pain Rating Post-Intervention 1: 0/10    Objective:   Patient found with: telemetry    Functional Mobility:  Bed Mobility:   Scooting/Bridging: Modified Independent (to Edge of chair)  Sit to Supine: Independent    Transfers:  Bed <> Chair Technique: Squat Pivot (b/s chair > EOB)  Bed <> Chair Assistance: Contact Guard Assistance, Minimum Assistance  Bed <> Chair Assistive Device: No Assistive Device    Gait:   Gait Distance: did not perform.    Stairs:      Balance:   Static Sit: NORMAL: No deviations seen in posture held statically  Dynamic Sit: NORMAL: No deviations seen in posture held dynamically  Static Stand: FAIR+: Takes MINIMAL challenges from all directions  Dynamic stand: FAIR: Needs CONTACT GUARD during gait     Therapeutic Activities and Exercises:  Pt declined donning RLE prosthesis. Pt stated he tried earlier with therapy and he couldn't secondary to swelling in residual limb. Pt stated that a family member was bringing his  to the hospital to assist with decreasing the swelling.  Transfer b/s chair > EOB with squat pivot, use of b/r, and David/CGA " for balance.  BLE therex: AP(L only with assistance), LAQ (knee flexion/ext RLE), hip flexion/ABD/ADD, and glut sets x 15 reps.  Scooted laterally up to HOB with CGA/SBA.     AM-PAC 6 CLICK MOBILITY  How much help from another person does this patient currently need?   1 = Unable, Total/Dependent Assistance  2 = A lot, Maximum/Moderate Assistance  3 = A little, Minimum/Contact Guard/Supervision  4 = None, Modified [place]/Independent    Turning over in bed (including adjusting bedclothes, sheets and blankets)?: 4  Sitting down on and standing up from a chair with arms (e.g., wheelchair, bedside commode, etc.): 3  Moving from lying on back to sitting on the side of the bed?: 4  Moving to and from a bed to a chair (including a wheelchair)?: 3  Need to walk in hospital room?: 3  Climbing 3-5 steps with a railing?: 2  Total Score: 19    AM-PAC Raw Score CMS G-Code Modifier Level of Impairment Assistance   6 % Total / Unable   7 - 9 CM 80 - 100% Maximal Assist   10 - 14 CL 60 - 80% Moderate Assist   15 - 19 CK 40 - 60% Moderate Assist   20 - 22 CJ 20 - 40% Minimal Assist   23 CI 1-20% SBA / CGA   24 CH 0% Independent/ Mod I     Patient left supine with all lines intact, call button in reach, bed alarm on and RN notified.    Assessment:  Williams Bland is a 67 y.o. male with a medical diagnosis of ESRD on hemodialysis and presents with increased fatigue and decreased strength and endurance.  Pt declined donning RLE prosthesis today secondary to residual limb being swollen.  Pt stated a family member was bring his  to the hospital to assist in reducing swelling.  Pt would continue to benefit from P.T. To address impairments listed below    Rehab identified problem list/impairments: Rehab identified problem list/impairments: weakness, impaired endurance, impaired self care skills, impaired functional mobilty, impaired balance, decreased lower extremity function    Rehab potential is good.    Activity  tolerance: Fair    Discharge recommendations: Discharge Facility/Level Of Care Needs: home     Barriers to discharge: Barriers to Discharge: None    Equipment recommendations: Equipment Needed After Discharge: none     GOALS:    Physical Therapy Goals        Problem: Physical Therapy Goal    Goal Priority Disciplines Outcome Goal Variances Interventions   Physical Therapy Goal     PT/OT, PT Ongoing (interventions implemented as appropriate)     Description:  Goals to be met by: 17     Patient will increase functional independence with mobility by performin. Supine <> sit with [place]  2. Sit to stand transfer with Modified [place]  3. Bed to chair transfer with Modified [place] using Rolling Walker  4. Gait  x 150 feet with Modified [place] using Rolling Walker.                       PLAN:    Patient to be seen 5 x/week  to address the above listed problems via gait training, therapeutic activities, therapeutic exercises  Plan of Care expires: 17  Plan of Care reviewed with: patient         Mindi Cook, RUDDY  2017

## 2017-07-21 NOTE — PT/OT/SLP DISCHARGE
Occupational Therapy Discharge Summary    Williams Bland  MRN: [id number]   ESRD on hemodialysis   Patient Discharged from acute Occupational Therapy on 7/18.  Please refer to prior OT note dated on 7/18 for functional status.     Assessment:   Patient appropriate for care in another setting.  GOALS:    Occupational Therapy Goals        Problem: Occupational Therapy Goal    Goal Priority Disciplines Outcome Interventions   Occupational Therapy Goal     OT, PT/OT Ongoing (interventions implemented as appropriate)    Description:  Goals to be met by: 8/17/17     Patient will increase functional independence with ADLs by performing:    LE Dressing with Modified [place].  Grooming while standing with Modified [place].  Toileting from toilet with Modified [place] for hygiene and clothing management.   Stand pivot transfers with Modified [place].  Toilet transfer to toilet with Modified [place].  Increased functional strength to WFL for self care skills and functional mobility.  Upper extremity exercise program x10 reps per handout, with independence.                    Reasons for Discontinuation of Therapy Services  Transfer to alternate level of care.      Plan:  Patient Discharged to: Home no PT services needed.

## 2017-09-14 PROBLEM — R60.9 SWELLING: Status: ACTIVE | Noted: 2017-01-01

## 2017-09-14 NOTE — ED PROVIDER NOTES
Encounter Date: 9/14/2017       History     Chief Complaint   Patient presents with    Leg Pain     dialysis patient M, W, F, complains of increase in fluid retention in legs and abdomen since monday     67-year-old male with history of diabetes CHF CVA coronary artery disease hypertension past stroke and dialysis dependent presenting today with leg swelling abdominal swelling and some confusion/increased lethargy per home health nurse Iftikhar who sees the patient once weekly for the past several years.  I spoke to Iftikhar this morning- says when she got to the house at 11:30 there is some question about exactly when he took his pain pills or if he perhaps he took them more than once perhaps 2 or 3 times today.  His pills were in some disarray which is not the usual fashion.  In addition she noticed him to be slightly more confused defined by  Him asking questions about what she was doing , even though it was  the usual routine and he should understand what she was doing.  Also she seemed to be more sleepy specifically falling asleep when she was talking.  He did attend dialysis yesterday and it seems that he stayed  the whole time.  He does not have any vomiting or shortness of breath or fever or chills reported by the patient his wife Robyn or the home health nurse.  Home health nurse does note that she measures his ankle every week -typically measures 25-27 and today it is 27.5.  Patient is not short of breath he is not on home oxygen and is able to transfer himself from bed to the EMS stretcher using both upper extremities without any focal weakness.  He is alert on presentation and answers my questions and can follow basic commands.      Only complaint patient has his left foot pain on the dorsal foot that started around 2:00 this morning.  His explanation for the pills being in disarray with that his wife left him out she forgot to put them up.      Patient got 0.5 mg of Narcan in route per EMS with  improvement in his respiratory status however no change in his alertness level per the EMS personell        Illness        Review of patient's allergies indicates:  No Known Allergies  Past Medical History:   Diagnosis Date    CHF (congestive heart failure)     Coronary artery disease     CVA (cerebral vascular accident) 2013    Diabetes mellitus     ESRD (end stage renal disease)     Hypertension     Stroke      Past Surgical History:   Procedure Laterality Date    CARDIAC SURGERY      PTCA WITH STENT PLACEMENT    THYROID SURGERY       Family History   Problem Relation Age of Onset    Hypertension Father      Social History   Substance Use Topics    Smoking status: Former Smoker     Quit date: 9/22/2000    Smokeless tobacco: Never Used    Alcohol use No     Review of Systems    Physical Exam     Initial Vitals [09/14/17 1354]   BP Pulse Resp Temp SpO2   (!) 122/98 60 18 -- 97 %      MAP       106         Physical Exam    Nursing note and vitals reviewed.  Constitutional: He appears well-developed and well-nourished. He is not diaphoretic. No distress.   HENT:   Head: Normocephalic and atraumatic.   Pupils 3mm B   Eyes: Pupils are equal, round, and reactive to light.   Neck: Neck supple.   Cardiovascular: Regular rhythm.   Pulmonary/Chest: Effort normal and breath sounds normal. He has no wheezes.   Abdominal: Soft. There is no tenderness.   Protuberant appearance   Musculoskeletal:   3+ pitting edema on the left leg.  Right leg status post BKA a year ago    No warmth erythema or skin breakdown on the leg noted    On left dorsal foot were patient is indicating he had some pain there is no ecchymosis crepitance or bony point tenderness or erythema.   Neurological: He is alert and oriented to person, place, and time. He has normal strength. No cranial nerve deficit. GCS eye subscore is 4. GCS verbal subscore is 5. GCS motor subscore is 6.   Psychiatric: He has a normal mood and affect. His speech is  normal and behavior is normal. Thought content normal. He is not actively hallucinating. Cognition and memory are normal. He is attentive.         ED Course   Procedures  Labs Reviewed   URINALYSIS   CBC W/ AUTO DIFFERENTIAL   COMPREHENSIVE METABOLIC PANEL   TROPONIN I   B-TYPE NATRIURETIC PEPTIDE   OCCULT BLOOD X 1, STOOL   DRUG SCREEN PANEL, URINE EMERGENCY   TSH   POCT GLUCOSE MONITORING CONTINUOUS             Medical Decision Making:   Other:   I have discussed this case with another health care provider.       <> Summary of the Discussion: 6:56 PM spoke to FP for admit                   ED Course      Clinical Impression:   The encounter diagnosis was Swelling.                           Ameena Saucedo MD  09/14/17 7633

## 2017-09-14 NOTE — ED NOTES
Pt complain of leg pain. Pt stated that he went to dialysis yesterday 9/13/17. Pt denies any other pain at this time.

## 2017-09-14 NOTE — ED NOTES
Informed MD that pt had not yet provided a urine sample. Advised to wait without need for catheterization.

## 2017-09-14 NOTE — PROVIDER PROGRESS NOTES - EMERGENCY DEPT.
Encounter Date: 9/14/2017    ED Physician Progress Notes        Physician Note:   5:49 PM delay in workup and consult because the CBC and chemistry was not able to be located by lab.  We have sent called to see why the delay; lab was unable to locate.  It was redrawn and walked over there

## 2017-09-15 NOTE — HPI
"68 yo AAM with PMH of uncontrolled IDDM (A1c >14 7/18/17), HFrEF (EF 35 3/2017), ESRD (HD MWF), CAD s/p CABG/stents, previous CVA (2013) and right BKA presented to ED c/o increased fluid retention x 3 weeks. Patient states that he told his HD center about the swelling and was told that they would take more fluid off. He states that it is not getting better and that the swelling is now in his stomach stating, "my stomach is rising like bread."  He states that he last had HD yesterday. He states that he takes all of his medication as prescribed but ran out of lasix about 3 days ago.  He denies chest pain, palpations, sob, n/v/d/c, abdominal pain,.dysuria.  His only complaint is leg and abdominal swelling.      Patient brought medication bag with him to ED and when looking through there were some duplicate medications and lasix 80mg was present in the bag. Patient may be taking wrong medications or unaware of amount/dose taken.     Per ED note, patient had increased lethargy/sleepyness, confusion and sob prior to ED arrival. Home health nurse was concerned that patient may have taken medications incorrectly including narcotic pain medications. He was given Narcan by EMS with improvement to mentation and breathing.  On exam, patient was oriented x4,  awake for all of exam, answering appropriately and breathing comfortably on room air.         "

## 2017-09-15 NOTE — PLAN OF CARE
Problem: Kidney Disease, Chronic/End Stage Renal Disease (Adult)  Intervention: Monitor/Manage Fluid Electrolyte/Acid Base Balance  Consult with nephrology to continue dialysis treatment. Left av fistula \intact with thrill and bruit. Instructed on 1500 cc fluid restriction . Instructed on medications. Verbalized understanding of teaching. Telemetry sinus. No complaints of shortness of breath at this time.

## 2017-09-15 NOTE — ASSESSMENT & PLAN NOTE
- Echo in process. Last Echo in March 2017 showed EF 35%,   - CXR: blunting of the right costophrenic angle with pleural fluid versus pleural thickening and likely a small amount of pleural fluid, consistent with increased volume.   - BNP > 4900  - patient states out of home lasix 80 mg Qday x 2 days but lasix was present in his medication bag, unclear if patient actually taking medications as prescribed.   - IV lasix 80mg given in ED  Continuing lasix 80mg IV QID  - strict in/out  following UOP

## 2017-09-15 NOTE — ASSESSMENT & PLAN NOTE
- HD MWF at [facility] in [place]  - Last HD 9/13 will need HD for Friday  - Will consult Kidney consults, rec appreciated.

## 2017-09-15 NOTE — ASSESSMENT & PLAN NOTE
- trop 0.941  - EKG consistent with previous  - will trend CE and EKG  - consider cards consult when appropriate.

## 2017-09-15 NOTE — PLAN OF CARE
Problem: Physical Therapy Goal  Goal: Physical Therapy Goal  Goals to be met by: 10/15/17     Patient will increase functional independence with mobility by performin. Sit to stand transfer with Modified [place] withRW  2. Bed to chair transfer with Modified [place] using Rolling Walker  3. Lower extremity exercise program x10 reps per handout, with independence    Outcome: Ongoing (interventions implemented as appropriate)  Initial evaluation complete PT to follow.

## 2017-09-15 NOTE — H&P
"[facility]  History & Physical    Patient Name: Williams Bland  MRN: [id number]  Admission Date: 9/14/2017  Attending Physician: Susy Vincent MD   Primary Care Provider: Ronan Caldwell MD         Patient information was obtained from patient and ER records.     Subjective:     Principal Problem:Acute on chronic systolic heart failure    Chief Complaint:   Chief Complaint   Patient presents with    Leg Pain     dialysis patient M, W, F, complains of increase in fluid retention in legs and abdomen since monday        HPI: 66 yo AAM with PMH of uncontrolled IDDM (A1c >14 7/18/17), HFrEF (EF 35 3/2017), ESRD (HD MWF), CAD s/p CABG/stents, previous CVA (2013) and right BKA presented to ED c/o increased fluid retention x 3 weeks. Patient states that he told his HD center about the swelling and was told that they would take more fluid off. He states that it is not getting better and that the swelling is now in his stomach stating, "my stomach is rising like bread."  He states that he last had HD yesterday. He states that he takes all of his medication as prescribed but ran out of lasix about 3 days ago.  He denies chest pain, palpations, sob, n/v/d/c, abdominal pain,.dysuria.  His only complaint is leg and abdominal swelling.      Patient brought medication bag with him to ED and when looking through there were some duplicate medications and lasix 80mg was present in the bag. Patient may be taking wrong medications or unaware of amount/dose taken.     Per ED note, patient had increased lethargy/sleepyness, confusion and sob prior to ED arrival. Home health nurse was concerned that patient may have taken medications incorrectly including narcotic pain medications. He was given Narcan by EMS with improvement to mentation and breathing.  On exam, patient was oriented x4,  awake for all of exam, answering appropriately and breathing comfortably on room air.           Past Medical " History:   Diagnosis Date    CHF (congestive heart failure)     Coronary artery disease     CVA (cerebral vascular accident) 2013    Diabetes mellitus     ESRD (end stage renal disease)     Hypertension     Stroke        Past Surgical History:   Procedure Laterality Date    CARDIAC SURGERY      PTCA WITH STENT PLACEMENT    THYROID SURGERY         Review of patient's allergies indicates:  No Known Allergies    No current facility-administered medications on file prior to encounter.      Current Outpatient Prescriptions on File Prior to Encounter   Medication Sig    albuterol sulfate 2.5 mg/0.5 mL Nebu Take 2.5 mg by nebulization every 8 (eight) hours.    allopurinol (ZYLOPRIM) 100 MG tablet Take 100 mg by mouth every morning.     amlodipine (NORVASC) 10 MG tablet Take 1 tablet (10 mg total) by mouth once daily.    aspirin (ECOTRIN) 81 MG EC tablet Take 1 tablet (81 mg total) by mouth once daily.    atorvastatin (LIPITOR) 40 MG tablet Take 1 tablet (40 mg total) by mouth once daily.    blood sugar diagnostic (CONTOUR TEST STRIPS) Strp USE TO CHECK BLOOD SUGAR THREE TIMES A DAY.    carvedilol (COREG) 3.125 MG tablet Take 1 tablet (3.125 mg total) by mouth 2 (two) times daily with meals.    clopidogrel (PLAVIX) 75 mg tablet Take 1 tablet (75 mg total) by mouth once daily.    collagenase ointment Apply topically once daily.    furosemide (LASIX) 80 MG tablet Take 1 tablet (80 mg total) by mouth once daily. (Patient taking differently: Take 80 mg by mouth. Take 4 times a week on Tuesday, Thursday, Saturday, and Sunday.)    hydrocodone-acetaminophen 5-325mg (NORCO) 5-325 mg per tablet Take 1 tablet by mouth every 6 (six) hours as needed for Pain.    insulin aspart (NOVOLOG) 100 unit/mL InPn pen Inject 5 Units into the skin 3 (three) times daily with meals.    insulin detemir (LEVEMIR FLEXTOUCH) 100 unit/mL (3 mL) SubQ InPn pen Inject 10 Units into the skin every evening.    isosorbide mononitrate  (IMDUR) 60 MG 24 hr tablet Take 1 tablet (60 mg total) by mouth once daily.    levothyroxine (SYNTHROID) 125 MCG tablet Take 125 mcg by mouth once daily.    lisinopril (PRINIVIL,ZESTRIL) 20 MG tablet Take 1 tablet (20 mg total) by mouth once daily.    lubiprostone (AMITIZA) 8 MCG Cap Take 8 mcg by mouth 2 (two) times daily.    miconazole (MICOTIN) 2 % cream Apply topically 2 (two) times daily.    oxycodone-acetaminophen (PERCOCET)  mg per tablet Take 1 tablet by mouth every 6 (six) hours as needed for Pain.    pantoprazole (PROTONIX) 40 MG tablet Take 1 tablet by mouth daily.    pregabalin (LYRICA) 75 MG capsule Take 75 mg by mouth 2 (two) times daily. Take before and after dialysis    protein (ENSURE HIGH PROTEIN) Powd Take by mouth 3 (three) times daily. 3 teaspoons PO TID    sevelamer carbonate (RENVELA) 800 mg Tab Take 1 tablet (800 mg total) by mouth 3 (three) times daily with meals.    silver sulfADIAZINE 1% (SILVADENE) 1 % cream Apply 1 application topically 2 (two) times daily.    trazodone (DESYREL) 100 MG tablet Take 1 tablet (100 mg total) by mouth every evening.     Family History     Problem Relation (Age of Onset)    Hypertension Father        Social History Main Topics    Smoking status: Former Smoker     Quit date: 9/22/2000    Smokeless tobacco: Never Used    Alcohol use No    Drug use: No    Sexual activity: Not Currently     Review of Systems   Constitutional: Negative for activity change, chills, fatigue and fever.   HENT: Negative for congestion, rhinorrhea and sore throat.    Eyes: Negative for visual disturbance.   Respiratory: Negative for cough, chest tightness and shortness of breath.    Cardiovascular: Positive for leg swelling. Negative for chest pain and palpitations.   Gastrointestinal: Positive for abdominal distention. Negative for abdominal pain, constipation, diarrhea, nausea and vomiting.   Genitourinary: Negative for dysuria and hematuria.   Musculoskeletal:  Negative for arthralgias and myalgias.   Skin: Negative for rash.   Neurological: Negative for dizziness, light-headedness and headaches.   Psychiatric/Behavioral: Negative for agitation. The patient is not nervous/anxious.      Objective:     Vital Signs (Most Recent):  Temp: 97.1 °F (36.2 °C) (09/14/17 2212)  Pulse: (!) 57 (09/14/17 2212)  Resp: 18 (09/14/17 2212)  BP: 102/62 (09/14/17 2212)  SpO2: 96 % (09/14/17 2036) Vital Signs (24h Range):  Temp:  [97.1 °F (36.2 °C)-98.3 °F (36.8 °C)] 97.1 °F (36.2 °C)  Pulse:  [57-75] 57  Resp:  [18-20] 18  SpO2:  [95 %-98 %] 96 %  BP: (102-122)/(62-98) 102/62     Weight: 73.4 kg (161 lb 13.1 oz)  Body mass index is 22.57 kg/m².    Physical Exam   Constitutional: He is oriented to person, place, and time. He appears well-developed and well-nourished. No distress.   HENT:   Head: Normocephalic and atraumatic.   Mouth/Throat: Oropharynx is clear and moist.   Eyes: Conjunctivae and EOM are normal. Pupils are equal, round, and reactive to light.   Neck: Normal range of motion. Neck supple.   Cardiovascular: Normal rate, regular rhythm, normal heart sounds and intact distal pulses.    Pulmonary/Chest: Effort normal and breath sounds normal.   Abdominal: Soft. Bowel sounds are normal. He exhibits distension. There is no tenderness. There is no guarding.   Musculoskeletal: Normal range of motion. He exhibits edema (2+) and deformity (right bka). He exhibits no tenderness.   Neurological: He is alert and oriented to person, place, and time. He has normal reflexes. No cranial nerve deficit.   Skin: Skin is warm and dry. He is not diaphoretic.   Psychiatric: He has a normal mood and affect. His behavior is normal. Judgment and thought content normal.   Nursing note and vitals reviewed.       Significant Labs:   LABS  CBC    Recent Labs  Lab 09/14/17  1735   WBC 6.60   RBC 4.50*   HGB 11.1*   HCT 34.3*   PLT 86*   MCV 76*   MCH 24.7*   MCHC 32.4     BMP    Recent Labs  Lab  09/14/17  1735   *   K 6.2*   CO2 25   CL 94*   BUN 52*   CREATININE 5.3*   *     POCT-Glucose  POCT Glucose   Date Value Ref Range Status   09/14/2017 166 (H) 70 - 110 mg/dL Final         Recent Labs  Lab 09/14/17  1735   CALCIUM 8.3*     LFT    Recent Labs  Lab 09/14/17  1735   PROT 5.7*   ALBUMIN 2.8*   BILITOT 0.5   AST 90*   ALKPHOS 300*   ALT 92*     CE    Recent Labs  Lab 09/14/17  1523   TROPONINI 0.941*     BNP    Recent Labs  Lab 09/14/17  1523   BNP >4,900*     UA    Recent Labs  Lab 09/14/17  1754   COLORU Yellow   SPECGRAV >=1.030*   PHUR 5.0   PROTEINUA 2+*   BACTERIA Occasional     LAST HbA1c  Lab Results   Component Value Date    HGBA1C >14.0 (H) 07/18/2017       Significant Imaging:   CXR: Chronic blunting of the right costophrenic angle with pleural fluid versus pleural thickening and likely a small amount of pleural fluid.     US LE: No evidence of deep venous thrombosis bilaterally.    CT head w/o; No acute intracranial abnormality.    Assessment/Plan:     * Acute on chronic systolic heart failure    - Last Echo in March 2017 showed EF 35%,   - CXR: blunting of the right costophrenic angle with pleural fluid versus pleural thickening and likely a small amount of pleural fluid, consistent with increased volume.   - BNP > 4900  - patient states out of home lasix 80 mg Qday x 2 days but lasix was present in his medication bag, unclear if patient actually taking medications as prescribed.   - IV lasix 80mg given in ED  Will continue lasix 80mg IV BID  - strict in/out  following UOP  - ECHO ordered        ESRD on hemodialysis    - HD MWF at [facility] in [place]  - Last HD 9/13 will need HD for Friday  - Will consult Kidney consults, rec appreciated.              Elevated troponin    - trop 0.941  - EKG consistent with previous  - will trend CE and EKG  - consider cards consult when appropriate.          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   - BG on admission 190  - home medications levemir 10 units Qhs, novolog 5 units TID  - While inpatient will start with levemir 5 units Qhs and SSI with meals and adjust as necessary  - POCT glucose TID          Hypothyroid    - Continuing home medications, levothyroxine 125mcg  - TSH 1.686 wnl          Essential hypertension    /62  Will hold bp meds for now and monitor  Home medications: coreg 25bid, amlodipine 5qday, lisinopril 20qday, imdur 60day        Serum potassium elevated    In ED, K 6.2, treated with albuterol   Repeat BMP pending          Anemia    H/H appears at baseline  H/H 10.8/ 32.9          Hyperlipidemia LDL goal <70    - Continuing home medications.            CAD (coronary artery disease)    - s/p stent and CABG  - continuing plavix, asa            VTE Risk Mitigation         Ordered     Medium Risk of VTE  Once      09/14/17 2153     Place sequential compression device  Until discontinued      09/14/17 2153         Plan: Will admit for observation to [facility].    PT/OT: ordered  Code: full  Diet: renal, fluid restricted  Ppx: scd  Dispo: f/u  CE, ekg, K, nephro     Amanda Pena D.O.  [facility] HO-2  09/15/2017

## 2017-09-15 NOTE — ASSESSMENT & PLAN NOTE
- last a1c >14  Repeat pending.   - BG on admission 190  - home medications levemir 10 units Qhs, novolog 5 units TID  - While inpatient will start with levemir 5 units Qhs and SSI with meals and adjust as necessary  - POCT glucose TID

## 2017-09-15 NOTE — SUBJECTIVE & OBJECTIVE
Interval History: Patient says SOB has improved since admit. (+) bilateral lower extremity edema. Denies chest pain, n/v, fever, chills    Review of Systems   As stated above    Objective:     Vital Signs (Most Recent):  Temp: 98.6 °F (37 °C) (09/15/17 0807)  Pulse: 62 (09/15/17 1436)  Resp: 18 (09/15/17 1436)  BP: 111/64 (09/15/17 0807)  SpO2: 97 % (09/15/17 1436) Vital Signs (24h Range):  Temp:  [97.1 °F (36.2 °C)-98.6 °F (37 °C)] 98.6 °F (37 °C)  Pulse:  [57-80] 62  Resp:  [17-20] 18  SpO2:  [95 %-98 %] 97 %  BP: (102-122)/(62-87) 111/64     Weight: 77.6 kg (171 lb 1.2 oz)  Body mass index is 23.86 kg/m².    Intake/Output Summary (Last 24 hours) at 09/15/17 1512  Last data filed at 09/15/17 1210   Gross per 24 hour   Intake              430 ml   Output               75 ml   Net              355 ml      Physical Exam   Constitutional: He is oriented to person, place, and time. He appears well-developed and well-nourished. No distress.   HENT:   Head: Normocephalic and atraumatic.   Mouth/Throat: Oropharynx is clear and moist.   Eyes: Conjunctivae and EOM are normal. Pupils are equal, round, and reactive to light.   Neck: Normal range of motion. Neck supple.   Cardiovascular: Normal rate, regular rhythm, normal heart sounds and intact distal pulses.    Pulmonary/Chest: Effort normal and breath sounds normal.   Abdominal: Soft. Bowel sounds are normal. He exhibits distension. There is no tenderness. There is no guarding.   abdominal hernia   Musculoskeletal: Normal range of motion. He exhibits edema (2+ lower extremity) and deformity (right bka). He exhibits no tenderness.   Neurological: He is alert and oriented to person, place, and time. He has normal reflexes. No cranial nerve deficit.   Skin: Skin is warm and dry. He is not diaphoretic.   Psychiatric: He has a normal mood and affect. His behavior is normal. Judgment and thought content normal.       Significant Labs:   A1C:   Recent Labs  Lab 07/18/17  0506    HGBA1C >14.0*     ABGs: No results for input(s): PH, PCO2, HCO3, POCSATURATED, BE, TOTALHB, COHB, METHB, O2HB, POCFIO2 in the last 48 hours.  CBC:   Recent Labs  Lab 09/14/17  1735 09/15/17  0514   WBC 6.60 6.39   HGB 11.1* 11.6*   HCT 34.3* 35.0*   PLT 86* 120*     CMP:   Recent Labs  Lab 09/14/17  1735 09/15/17  0514   * 128*   K 6.2* 5.7*   CL 94* 94*   CO2 25 24   * 169*   BUN 52* 59*   CREATININE 5.3* 5.7*   CALCIUM 8.3* 8.4*   PROT 5.7* 5.9*   ALBUMIN 2.8* 2.9*   BILITOT 0.5 0.6   ALKPHOS 300* 318*   AST 90* 70*   ALT 92* 88*   ANIONGAP 8 10   EGFRNONAA 10* 10*     Cardiac Markers:   Recent Labs  Lab 09/14/17  1523   BNP >4,900*     Magnesium:   Recent Labs  Lab 09/15/17  0514   MG 1.7       Significant Imaging: US lower extremity: No evidence of deep venous thrombosis bilaterally. Edema is noted in the subcutaneous soft tissue bilaterally.    CT head: No acute intracranial abnormality.

## 2017-09-15 NOTE — PT/OT/SLP EVAL
Physical Therapy  Evaluation    Williams Bland   MRN: [id number]   Admitting Diagnosis: Acute on chronic systolic heart failure    PT Received On: 09/15/17  PT Start Time: 0921     PT Stop Time: 0948    PT Total Time (min): 27 min       Billable Minutes:  Evaluation 10 and Therapeutic Activity 17    Diagnosis: Acute on chronic systolic heart failure  Diagnoses of Swelling, Elevated troponin, and CHF (congestive heart failure) were pertinent to this visit.    Past Medical History:   Diagnosis Date    CHF (congestive heart failure)     Coronary artery disease     CVA (cerebral vascular accident) 2013    Diabetes mellitus     ESRD (end stage renal disease)     Hypertension     Stroke       Past Surgical History:   Procedure Laterality Date    CARDIAC SURGERY      PTCA WITH STENT PLACEMENT    THYROID SURGERY         Referring physician: Amanda Pena DO  Date referred to PT: 9/14/17    General Precautions: Standard, fall  Orthopedic Precautions: N/A   Braces: N/A       Do you have any cultural, spiritual, Baptism conflicts, given your current situation?: none verbalized to PT    Patient History:  Lives With: alone  Living Arrangements: mobile home  Home Accessibility: stairs to enter home  Number of Stairs to Enter Home: 5  Stair Railings at Home: outside, present at both sides  Transportation Available: family or friend will provide  Living Environment Comment: pt reports living alone in a mobile home with 5 MAYA and BHR. Walk in shower, he has a RW and R prosthetic that he uses to ambulate household distances, pt reports using WC for community mobility.   Equipment Currently Used at Home: wheelchair, walker, rolling  DME owned (not currently used): none    Previous Level of Function:  Ambulation Skills: needs device  Transfer Skills: needs device  ADL Skills: needs device  Work/Leisure Activity: needs device    Subjective:  Communicated with ROMERO Yañez prior to session.  Pt agreeable to PT evaluation.  Pt reports  falling about 1 a month at home 2* to a rug when entering the house. PT educated him on how removing the rug would improve his safety, pt agreed and stated he will have it picked up when he got home.  Chief Complaint: decreased functional mobility 2* to edema  Patient goals: return to PLOF    Pain/Comfort  Pain Rating 1: 0/10  Pain Rating Post-Intervention 2: 0/10      Objective:   Patient found with: telemetry     Cognitive Exam:  Oriented to: Person, Place, Time and Situation    Follows Commands/attention: Follows multistep  commands  Communication: clear/fluent  Safety awareness/insight to disability: intact    Physical Exam:  Postural examination/scapula alignment: Rounded shoulder and Head forward    Skin integrity: Visible skin intact  Edema: Pitting LLE    Sensation:   Intact    Upper Extremity Range of Motion:  Right Upper Extremity: WFL  Left Upper Extremity: WFL    Upper Extremity Strength:  Right Upper Extremity: WFL  Left Upper Extremity: WFL    Lower Extremity Range of Motion:  Right Lower Extremity: WFL  Left Lower Extremity: WFL    Lower Extremity Strength:  Right Lower Extremity: WFL  Left Lower Extremity: WFL     Fine motor coordination:  Intact    Gross motor coordination: WFL    Functional Mobility:  Bed Mobility:  Rolling/Turning to Left: Supervision  Rolling/Turning Right: Supervision  Scooting/Bridging: Supervision  Supine to Sit: Supervision  Sit to Supine: Supervision    Transfers:  Sit <> Stand Assistance: Contact Guard Assistance  Sit <> Stand Assistive Device: Rolling Walker  Bed <> Chair Assistance: Activity did not occur  Toilet Transfer Assistance: Activity Did not occur  Car Transfer Assistance: Activity did not occur    Gait:   Gait Distance: Activity did not occur. Pt was able to slide LLE on floor using RW and CGA.     Stairs:  Activity did not occur.     Balance:   Static Sit: GOOD+: Takes MAXIMAL challenges from all directions.    Dynamic Sit: GOOD+: Maintains balance through  MAXIMAL excursions of active trunk motion  Static Stand: FAIR: Maintains without assist but unable to take challenges  Dynamic stand: FAIR: Needs CONTACT GUARD during gait    Therapeutic Activities and Exercises:  Bed mobility as listed above, unable to perform ambulation 2* pt not having prosthesis. Pt was able to slide LLE 1 foot towards head of bed. Seated hip flexion B X10 EOB    AM-PAC 6 CLICK MOBILITY  How much help from another person does this patient currently need?   1 = Unable, Total/Dependent Assistance  2 = A lot, Maximum/Moderate Assistance  3 = A little, Minimum/Contact Guard/Supervision  4 = None, Modified [place]/Independent    Turning over in bed (including adjusting bedclothes, sheets and blankets)?: 4  Sitting down on and standing up from a chair with arms (e.g., wheelchair, bedside commode, etc.): 3  Moving from lying on back to sitting on the side of the bed?: 4  Moving to and from a bed to a chair (including a wheelchair)?: 3  Need to walk in hospital room?: 1  Climbing 3-5 steps with a railing?: 1  Total Score: 16     AM-PAC Raw Score CMS G-Code Modifier Level of Impairment Assistance   6 % Total / Unable   7 - 9 CM 80 - 100% Maximal Assist   10 - 14 CL 60 - 80% Moderate Assist   15 - 19 CK 40 - 60% Moderate Assist   20 - 22 CJ 20 - 40% Minimal Assist   23 CI 1-20% SBA / CGA   24 CH 0% Independent/ Mod I     Patient left HOB elevated with all lines intact, call button in reach and bed alarm on.    Assessment:   Williams Bland is a 67 y.o. male with a medical diagnosis of Acute on chronic systolic heart failure and presents with Diagnoses of Swelling, Elevated troponin, and CHF (congestive heart failure) were pertinent to this visit. Pt with impairments listed below. Pt would benefit from continued PT services to improve current impairments and return to PLOF.     Rehab identified problem list/impairments: Rehab identified problem list/impairments: weakness, impaired functional  mobilty, gait instability, impaired balance    Rehab potential is good.    Activity tolerance: Good    Discharge recommendations: Discharge Facility/Level Of Care Needs:  (TBD)     Barriers to discharge: Barriers to Discharge: None    Equipment recommendations: Equipment Needed After Discharge: none     GOALS:    Physical Therapy Goals        Problem: Physical Therapy Goal    Goal Priority Disciplines Outcome Goal Variances Interventions   Physical Therapy Goal     PT/OT, PT Ongoing (interventions implemented as appropriate)     Description:  Goals to be met by: 10/15/17     Patient will increase functional independence with mobility by performin. Sit to stand transfer with Modified [place] withRW  2. Bed to chair transfer with Modified [place] using Rolling Walker  3. Lower extremity exercise program x10 reps per handout, with independence                      PLAN:    Patient to be seen 6 x/week to address the above listed problems via gait training, therapeutic activities, therapeutic exercises  Plan of Care expires: 10/15/17  Plan of Care reviewed with: patient          Iker Castellanos, PT  09/15/2017

## 2017-09-15 NOTE — PROGRESS NOTES
"  TN spoke with Deisi at [facility], she states that the patient needs a higher level of care than HH, stating the patient has been asking for food to be fixed for him, states that the wife is "done taking care of him".  TN stated that wife made no mention of this to TN and that if the patient is here for an acute episode of fluid overload and will likely be dc'd after dialysis tomorrow.  TN stated that without a 3 midnight stay the patient would not qualify for SNF and if the patient needs NH placement due to an ongoing issue at the house, that NH placement can be made from home.     Tn called wife and she did not mention any issues explained above and also said "you'll have to speak to him". Further, patient has capacity and does not want placement of any kind.  TN notified MELBA Velazquez of this information.  "

## 2017-09-15 NOTE — ASSESSMENT & PLAN NOTE
- HD MWF at [facility] in [place]  - Per patient last HD 2 weeks ago  - Nephro consulted, recs appreciated.    - Had HD today  - BUN/Cr 59/5.7 this AM

## 2017-09-15 NOTE — ASSESSMENT & PLAN NOTE
- last a1c >14 (7/2017)  - Fasting BG this   - home medications levemir 10 units Qhs, novolog 5 units TID  - currently on levemir 5 units Qhs and SSI with meals   - POCT glucose TID

## 2017-09-15 NOTE — PROGRESS NOTES
"[facility]  Progress Note    Patient Name: Williams Bland  MRN: [id number]  Patient Class: IP- Inpatient   Admission Date: 9/14/2017  Length of Stay: 1 days  Attending Physician: Susy Vincent MD  Primary Care Provider: Ronan Caldwell MD        Subjective:     Principal Problem:Acute on chronic systolic heart failure    HPI:  66 yo AAM with PMH of uncontrolled IDDM (A1c >14 7/18/17), HFrEF (EF 35 3/2017), ESRD (HD MWF), CAD s/p CABG/stents, previous CVA (2013) and right BKA presented to ED c/o increased fluid retention x 3 weeks. Patient states that he told his HD center about the swelling and was told that they would take more fluid off. He states that it is not getting better and that the swelling is now in his stomach stating, "my stomach is rising like bread."  He states that he last had HD yesterday. He states that he takes all of his medication as prescribed but ran out of lasix about 3 days ago.  He denies chest pain, palpations, sob, n/v/d/c, abdominal pain,.dysuria.  His only complaint is leg and abdominal swelling.      Patient brought medication bag with him to ED and when looking through there were some duplicate medications and lasix 80mg was present in the bag. Patient may be taking wrong medications or unaware of amount/dose taken.     Per ED note, patient had increased lethargy/sleepyness, confusion and sob prior to ED arrival. Home health nurse was concerned that patient may have taken medications incorrectly including narcotic pain medications. He was given Narcan by EMS with improvement to mentation and breathing.  On exam, patient was oriented x4,  awake for all of exam, answering appropriately and breathing comfortably on room air.         Interval History: Patient says SOB has improved since admit. (+) bilateral lower extremity edema. Denies chest pain, n/v, fever, chills    Review of Systems   As stated above    Objective:     Vital Signs (Most " Recent):  Temp: 98.6 °F (37 °C) (09/15/17 0807)  Pulse: 62 (09/15/17 1436)  Resp: 18 (09/15/17 1436)  BP: 111/64 (09/15/17 0807)  SpO2: 97 % (09/15/17 1436) Vital Signs (24h Range):  Temp:  [97.1 °F (36.2 °C)-98.6 °F (37 °C)] 98.6 °F (37 °C)  Pulse:  [57-80] 62  Resp:  [17-20] 18  SpO2:  [95 %-98 %] 97 %  BP: (102-122)/(62-87) 111/64     Weight: 77.6 kg (171 lb 1.2 oz)  Body mass index is 23.86 kg/m².    Intake/Output Summary (Last 24 hours) at 09/15/17 1512  Last data filed at 09/15/17 1210   Gross per 24 hour   Intake              430 ml   Output               75 ml   Net              355 ml      Physical Exam   Constitutional: He is oriented to person, place, and time. He appears well-developed and well-nourished. No distress.   HENT:   Head: Normocephalic and atraumatic.   Mouth/Throat: Oropharynx is clear and moist.   Eyes: Conjunctivae and EOM are normal. Pupils are equal, round, and reactive to light.   Neck: Normal range of motion. Neck supple.   Cardiovascular: Normal rate, regular rhythm, normal heart sounds and intact distal pulses.    Pulmonary/Chest: Effort normal and breath sounds normal.   Abdominal: Soft. Bowel sounds are normal. He exhibits distension. There is no tenderness. There is no guarding.   abdominal hernia   Musculoskeletal: Normal range of motion. He exhibits edema (2+ lower extremity) and deformity (right bka). He exhibits no tenderness.   Neurological: He is alert and oriented to person, place, and time. He has normal reflexes. No cranial nerve deficit.   Skin: Skin is warm and dry. He is not diaphoretic.   Psychiatric: He has a normal mood and affect. His behavior is normal. Judgment and thought content normal.       Significant Labs:   A1C:   Recent Labs  Lab 07/18/17  0501   HGBA1C >14.0*     ABGs: No results for input(s): PH, PCO2, HCO3, POCSATURATED, BE, TOTALHB, COHB, METHB, O2HB, POCFIO2 in the last 48 hours.  CBC:   Recent Labs  Lab 09/14/17  1735 09/15/17  0514   WBC 6.60 6.39    HGB 11.1* 11.6*   HCT 34.3* 35.0*   PLT 86* 120*     CMP:   Recent Labs  Lab 09/14/17  1735 09/15/17  0514   * 128*   K 6.2* 5.7*   CL 94* 94*   CO2 25 24   * 169*   BUN 52* 59*   CREATININE 5.3* 5.7*   CALCIUM 8.3* 8.4*   PROT 5.7* 5.9*   ALBUMIN 2.8* 2.9*   BILITOT 0.5 0.6   ALKPHOS 300* 318*   AST 90* 70*   ALT 92* 88*   ANIONGAP 8 10   EGFRNONAA 10* 10*     Cardiac Markers:   Recent Labs  Lab 09/14/17  1523   BNP >4,900*     Magnesium:   Recent Labs  Lab 09/15/17  0514   MG 1.7       Significant Imaging: US lower extremity: No evidence of deep venous thrombosis bilaterally. Edema is noted in the subcutaneous soft tissue bilaterally.    CT head: No acute intracranial abnormality.        Assessment/Plan:      * Acute on chronic systolic heart failure    - Echo in process. Last Echo in March 2017 showed EF 35%,   - CXR: blunting of the right costophrenic angle with pleural fluid versus pleural thickening and likely a small amount of pleural fluid, consistent with increased volume.   - BNP > 4900  - patient states out of home lasix 80 mg Qday x 2 days but lasix was present in his medication bag, unclear if patient actually taking medications as prescribed.   - IV lasix 80mg given in ED  Continuing lasix 80mg IV QID  - strict in/out  following UOP          Swelling              Essential hypertension    -120s/60-70s  Home medications: coreg 25 bid, amlodipine 5 qday, lisinopril 20 qday, imdur   Continue home coreg, withholding some home meds due to low normal bp        ESRD on hemodialysis    - HD MWF at [facility] in [place]  - Per patient last HD 2 weeks ago  - Nephro consulted, recs appreciated.    - Had HD today  - BUN/Cr 59/5.7 this AM            Serum potassium elevated    - In ED, K 6.2, treated with albuterol   - Repeat 5.7 this AM.  - Will treat and f/u BMP          Anemia    - H/H 11.6/35.0  - Stable. 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, with long-term current use of insulin    - last a1c >14 (7/2017)  - Fasting BG this   - home medications levemir 10 units Qhs, novolog 5 units TID  - currently on levemir 5 units Qhs and SSI with meals   - POCT glucose TID          Elevated troponin    - trop 0.941  - EKG consistent with previous  - Cardiac enzymes trended down  - On aspirin, plavix, holding home lipitor 2/2 to mild elevation in AST/ALT            CAD (coronary artery disease)    - s/p stent and CABG  - continuing plavix, asa  - Trop elevated at admit, 0.941, trended down            VTE Risk Mitigation         Ordered     Medium Risk of VTE  Once      09/14/17 2153     Place sequential compression device  Until discontinued      09/14/17 2153          Dispo: F/U K+, nephro recs, echo, UOP      Rose Mary Ludwig MD  9/15/2017  [facility] HO-2

## 2017-09-15 NOTE — SUBJECTIVE & OBJECTIVE
Past Medical History:   Diagnosis Date    CHF (congestive heart failure)     Coronary artery disease     CVA (cerebral vascular accident) 2013    Diabetes mellitus     ESRD (end stage renal disease)     Hypertension     Stroke        Past Surgical History:   Procedure Laterality Date    CARDIAC SURGERY      PTCA WITH STENT PLACEMENT    THYROID SURGERY         Review of patient's allergies indicates:  No Known Allergies    No current facility-administered medications on file prior to encounter.      Current Outpatient Prescriptions on File Prior to Encounter   Medication Sig    albuterol sulfate 2.5 mg/0.5 mL Nebu Take 2.5 mg by nebulization every 8 (eight) hours.    allopurinol (ZYLOPRIM) 100 MG tablet Take 100 mg by mouth every morning.     amlodipine (NORVASC) 10 MG tablet Take 1 tablet (10 mg total) by mouth once daily.    aspirin (ECOTRIN) 81 MG EC tablet Take 1 tablet (81 mg total) by mouth once daily.    atorvastatin (LIPITOR) 40 MG tablet Take 1 tablet (40 mg total) by mouth once daily.    blood sugar diagnostic (CONTOUR TEST STRIPS) Strp USE TO CHECK BLOOD SUGAR THREE TIMES A DAY.    carvedilol (COREG) 3.125 MG tablet Take 1 tablet (3.125 mg total) by mouth 2 (two) times daily with meals.    clopidogrel (PLAVIX) 75 mg tablet Take 1 tablet (75 mg total) by mouth once daily.    collagenase ointment Apply topically once daily.    furosemide (LASIX) 80 MG tablet Take 1 tablet (80 mg total) by mouth once daily. (Patient taking differently: Take 80 mg by mouth. Take 4 times a week on Tuesday, Thursday, Saturday, and Sunday.)    hydrocodone-acetaminophen 5-325mg (NORCO) 5-325 mg per tablet Take 1 tablet by mouth every 6 (six) hours as needed for Pain.    insulin aspart (NOVOLOG) 100 unit/mL InPn pen Inject 5 Units into the skin 3 (three) times daily with meals.    insulin detemir (LEVEMIR FLEXTOUCH) 100 unit/mL (3 mL) SubQ InPn pen Inject 10 Units into the skin every evening.    isosorbide  mononitrate (IMDUR) 60 MG 24 hr tablet Take 1 tablet (60 mg total) by mouth once daily.    levothyroxine (SYNTHROID) 125 MCG tablet Take 125 mcg by mouth once daily.    lisinopril (PRINIVIL,ZESTRIL) 20 MG tablet Take 1 tablet (20 mg total) by mouth once daily.    lubiprostone (AMITIZA) 8 MCG Cap Take 8 mcg by mouth 2 (two) times daily.    miconazole (MICOTIN) 2 % cream Apply topically 2 (two) times daily.    oxycodone-acetaminophen (PERCOCET)  mg per tablet Take 1 tablet by mouth every 6 (six) hours as needed for Pain.    pantoprazole (PROTONIX) 40 MG tablet Take 1 tablet by mouth daily.    pregabalin (LYRICA) 75 MG capsule Take 75 mg by mouth 2 (two) times daily. Take before and after dialysis    protein (ENSURE HIGH PROTEIN) Powd Take by mouth 3 (three) times daily. 3 teaspoons PO TID    sevelamer carbonate (RENVELA) 800 mg Tab Take 1 tablet (800 mg total) by mouth 3 (three) times daily with meals.    silver sulfADIAZINE 1% (SILVADENE) 1 % cream Apply 1 application topically 2 (two) times daily.    trazodone (DESYREL) 100 MG tablet Take 1 tablet (100 mg total) by mouth every evening.     Family History     Problem Relation (Age of Onset)    Hypertension Father        Social History Main Topics    Smoking status: Former Smoker     Quit date: 9/22/2000    Smokeless tobacco: Never Used    Alcohol use No    Drug use: No    Sexual activity: Not Currently     Review of Systems   Constitutional: Negative for activity change, chills, fatigue and fever.   HENT: Negative for congestion, rhinorrhea and sore throat.    Eyes: Negative for visual disturbance.   Respiratory: Negative for cough, chest tightness and shortness of breath.    Cardiovascular: Positive for leg swelling. Negative for chest pain and palpitations.   Gastrointestinal: Positive for abdominal distention. Negative for abdominal pain, constipation, diarrhea, nausea and vomiting.   Genitourinary: Negative for dysuria and hematuria.    Musculoskeletal: Negative for arthralgias and myalgias.   Skin: Negative for rash.   Neurological: Negative for dizziness, light-headedness and headaches.   Psychiatric/Behavioral: Negative for agitation. The patient is not nervous/anxious.      Objective:     Vital Signs (Most Recent):  Temp: 97.1 °F (36.2 °C) (09/14/17 2212)  Pulse: (!) 57 (09/14/17 2212)  Resp: 18 (09/14/17 2212)  BP: 102/62 (09/14/17 2212)  SpO2: 96 % (09/14/17 2036) Vital Signs (24h Range):  Temp:  [97.1 °F (36.2 °C)-98.3 °F (36.8 °C)] 97.1 °F (36.2 °C)  Pulse:  [57-75] 57  Resp:  [18-20] 18  SpO2:  [95 %-98 %] 96 %  BP: (102-122)/(62-98) 102/62     Weight: 73.4 kg (161 lb 13.1 oz)  Body mass index is 22.57 kg/m².    Physical Exam   Constitutional: He is oriented to person, place, and time. He appears well-developed and well-nourished. No distress.   HENT:   Head: Normocephalic and atraumatic.   Mouth/Throat: Oropharynx is clear and moist.   Eyes: Conjunctivae and EOM are normal. Pupils are equal, round, and reactive to light.   Neck: Normal range of motion. Neck supple.   Cardiovascular: Normal rate, regular rhythm, normal heart sounds and intact distal pulses.    Pulmonary/Chest: Effort normal and breath sounds normal.   Abdominal: Soft. Bowel sounds are normal. He exhibits distension. There is no tenderness. There is no guarding.   Musculoskeletal: Normal range of motion. He exhibits edema (2+) and deformity (right bka). He exhibits no tenderness.   Neurological: He is alert and oriented to person, place, and time. He has normal reflexes. No cranial nerve deficit.   Skin: Skin is warm and dry. He is not diaphoretic.   Psychiatric: He has a normal mood and affect. His behavior is normal. Judgment and thought content normal.   Nursing note and vitals reviewed.       Significant Labs:   LABS  CBC    Recent Labs  Lab 09/14/17  1735   WBC 6.60   RBC 4.50*   HGB 11.1*   HCT 34.3*   PLT 86*   MCV 76*   MCH 24.7*   MCHC 32.4     BMP    Recent  Labs  Lab 09/14/17  1735   *   K 6.2*   CO2 25   CL 94*   BUN 52*   CREATININE 5.3*   *     POCT-Glucose  POCT Glucose   Date Value Ref Range Status   09/14/2017 166 (H) 70 - 110 mg/dL Final         Recent Labs  Lab 09/14/17  1735   CALCIUM 8.3*     LFT    Recent Labs  Lab 09/14/17  1735   PROT 5.7*   ALBUMIN 2.8*   BILITOT 0.5   AST 90*   ALKPHOS 300*   ALT 92*     CE    Recent Labs  Lab 09/14/17  1523   TROPONINI 0.941*     BNP    Recent Labs  Lab 09/14/17  1523   BNP >4,900*     UA    Recent Labs  Lab 09/14/17  1754   COLORU Yellow   SPECGRAV >=1.030*   PHUR 5.0   PROTEINUA 2+*   BACTERIA Occasional     LAST HbA1c  Lab Results   Component Value Date    HGBA1C >14.0 (H) 07/18/2017       Significant Imaging:   CXR: Chronic blunting of the right costophrenic angle with pleural fluid versus pleural thickening and likely a small amount of pleural fluid.     US LE: No evidence of deep venous thrombosis bilaterally.    CT head w/o; No acute intracranial abnormality.

## 2017-09-15 NOTE — PLAN OF CARE
Problem: Hemodialysis (Adult)  Goal: Signs and Symptoms of Listed Potential Problems Will be Absent, Minimized or Managed (Hemodialysis)  Signs and symptoms of listed potential problems will be absent, minimized or managed by discharge/transition of care (reference Hemodialysis (Adult) CPG).    09/15/17 1245   Hemodialysis   Problems Assessed (Hemodialysis) electrolyte imbalance;fluid imbalance   Problems Present (Hemodialysis) electrolyte imbalance;fluid imbalance   Hd with uf

## 2017-09-15 NOTE — ASSESSMENT & PLAN NOTE
-120s/60-70s  Home medications: coreg 25 bid, amlodipine 5 qday, lisinopril 20 qday, imdur   Continue home coreg, withholding some home meds due to low normal bp

## 2017-09-15 NOTE — PLAN OF CARE
Problem: Patient Care Overview  Goal: Plan of Care Review  Outcome: Ongoing (interventions implemented as appropriate)  Pt safety maintained. Bed in low and locked position. Call bell within reach. Pt went to dialysis today. DM being managed with diet and medications. Pt remained tele NSR throughout this shift. No distress noted. Will continue to monitor.

## 2017-09-15 NOTE — ASSESSMENT & PLAN NOTE
- trop 0.941  - EKG consistent with previous  - Cardiac enzymes trended down  - On aspirin, plavix, holding home lipitor 2/2 to mild elevation in AST/ALT

## 2017-09-15 NOTE — PROCEDURES
"Williams Bland is a 67 y.o. male patient.    Temp: 98.6 °F (37 °C) (09/15/17 0807)  Pulse: 80 (09/15/17 0807)  Resp: 19 (09/15/17 0807)  BP: 111/64 (09/15/17 0807)  SpO2: 96 % (09/15/17 0724)  Weight: 77.6 kg (171 lb 1.2 oz) (09/15/17 0400)  Height: 5' 11" (180.3 cm) (09/14/17 2223)       Procedures   Patient seen and examined on dialysis.  BP 92/55  HR 62  AP -80   160        Full consult note to follow with history and physical.      Zaynab Malone  9/15/2017    "

## 2017-09-15 NOTE — PT/OT/SLP EVAL
"Occupational Therapy  Evaluation    Williams Bland   MRN: [id number]   Admitting Diagnosis: Acute on chronic systolic heart failure    OT Date of Treatment: 09/15/17   OT Start Time: 1430  OT Stop Time: 1443  OT Total Time (min): 13 min    Billable Minutes:  Evaluation 13    Diagnosis: Acute on chronic systolic heart failure   Diagnoses of Swelling, Elevated troponin, and CHF (congestive heart failure) were pertinent to this visit.      Past Medical History:   Diagnosis Date    CHF (congestive heart failure)     Coronary artery disease     CVA (cerebral vascular accident) 2013    Diabetes mellitus     ESRD (end stage renal disease)     Hypertension     Stroke       Past Surgical History:   Procedure Laterality Date    CARDIAC SURGERY      PTCA WITH STENT PLACEMENT    THYROID SURGERY           General Precautions: Standard, fall  Orthopedic Precautions:  (R BKA)  Braces:            Patient History:  Living Environment  Lives With: spouse  Living Arrangements: mobile home  Number of Stairs to Enter Home: 5  Stair Railings at Home: outside, present at both sides  Living Environment Comment: Pt lives with spouse in mobile home, 5 steps to enter, B rails, tub/shower combo. Pt reports assist required from HH aid for ADLs, besides toileting which he uses BSC near bed. w/c for community ambulation and RW in the home   Equipment Currently Used at Home: shower chair, bedside commode, walker, rolling, wheelchair    Prior level of function:   Bed Mobility/Transfers: needs device  Grooming: independent  Bathing: needs device and assist  Upper Body Dressing: independent  Lower Body Dressing: needs assist  Toileting: needs device  Driving License: No     Dominant hand: right    Subjective:  Communicated with nsg prior to session.  Pt states, " people been asking me questions all day"  Chief Complaint: swelling  Patient/Family stated goals: return home     Pain/Comfort  Pain Rating 1: 0/10    Objective:       Cognitive " Exam:  Oriented to: Person, Place, Time and Situation  Follows Commands/attention: Follows multistep  commands  Communication: clear/fluent  Memory:  No Deficits noted  Safety awareness/insight to disability: impaired  Coping skills/emotional control: Appropriate to situation    Visual/perceptual:  Intact    Physical Exam:  Postural examination/scapula alignment: Rounded shoulder and Head forward  Skin integrity: Visible skin intact  Edema: Moderate LLE    Sensation:   Intact    Upper Extremity Range of Motion:  Right Upper Extremity: WFL  Left Upper Extremity: WFL    Upper Extremity Strength:  Right Upper Extremity: 4-/5  Left Upper Extremity: 4-/5   Strength: good     Fine motor coordination:   Intact        Functional Mobility:  Bed Mobility:  Sit to Supine: Contact Guard Assistance    Transfers:  Sit <> Stand Assistance: Minimum Assistance  Sit <> Stand Assistive Device: Rolling Walker  Bed <> Chair Technique: Stand Pivot  Bed <> Chair Transfer Assistance: Minimum Assistance  Bed <> Chair Assistive Device: Rolling Walker    Functional Ambulation: did not occur     Activities of Daily Living:    LE Dressing Level of Assistance: Contact guard      Balance:   Static Sit: GOOD: Takes MODERATE challenges from all directions  Dynamic Sit: GOOD-: Maintains balance through MODERATE excursions of active trunk movement,     Static Stand: POOR+: Needs MINIMAL assist to maintain  Dynamic stand: POOR: N/A        AM-PAC 6 CLICK ADL  How much help from another person does this patient currently need?  1 = Unable, Total/Dependent Assistance  2 = A lot, Maximum/Moderate Assistance  3 = A little, Minimum/Contact Guard/Supervision  4 = None, Modified [place]/Independent    Putting on and taking off regular lower body clothing? : 3  Bathing (including washing, rinsing, drying)?: 3  Toileting, which includes using toilet, bedpan, or urinal? : 3  Putting on and taking off regular upper body clothing?: 4  Taking care of  "personal grooming such as brushing teeth?: 4  Eating meals?: 4  Total Score: 21    AM-PAC Raw Score CMS "G-Code Modifier Level of Impairment Assistance   6 % Total / Unable   7 - 9 CM 80 - 100% Maximal Assist   10-14 CL 60 - 80% Moderate Assist   15 - 19 CK 40 - 60% Moderate Assist   20 - 22 CJ 20 - 40% Minimal Assist   23 CI 1-20% SBA / CGA   24 CH 0% Independent/ Mod I       Patient left seated EOB with all lines intact, call button in reach, bed alarm on, nsg notified and respiratory therapist  present    Assessment:  Williams Bland is a 67 y.o. male with a medical diagnosis of Acute on chronic systolic heart failure and presents with deconditioning, LE swelling. Pt would benefit from cont OT services in order to maximize functional independence. Recommending resume HHOT/PT at d/c     Rehab identified problem list/impairments: Rehab identified problem list/impairments: impaired endurance, impaired balance, impaired self care skills, edema, impaired functional mobilty, weakness, gait instability, decreased safety awareness    Rehab potential is good.    Activity tolerance: Good    Discharge recommendations: Discharge Facility/Level Of Care Needs: home health PT, home health OT     Barriers to discharge: Barriers to Discharge: Inaccessible home environment, Decreased caregiver support    Equipment recommendations: none     GOALS:    Occupational Therapy Goals        Problem: Occupational Therapy Goal    Goal Priority Disciplines Outcome Interventions   Occupational Therapy Goal     OT, PT/OT Ongoing (interventions implemented as appropriate)    Description:  Goals to be met by: 10/15/17     Patient will increase functional independence with ADLs by performing:    LE Dressing with Supervision.  Toileting from bedside commode with Supervision for hygiene and clothing management.   Stand pivot transfers with Supervision.  Toilet transfer to bedside commode with Supervision.  Increased functional strength to WFL " for self care skills and functional mobility.  Upper extremity exercise program x10 reps per handout, with independence.                      PLAN:  Patient to be seen 5 x/week to address the above listed problems via self-care/home management, therapeutic activities, therapeutic exercises  Plan of Care expires: 10/15/17  Plan of Care reviewed with: patient         Lona VEDA Galvez, OT  09/15/2017

## 2017-09-15 NOTE — ASSESSMENT & PLAN NOTE
- Last Echo in March 2017 showed EF 35%,   - CXR: blunting of the right costophrenic angle with pleural fluid versus pleural thickening and likely a small amount of pleural fluid, consistent with increased volume.   - BNP > 4900  - patient states out of home lasix 80 mg Qday x 2 days but lasix was present in his medication bag, unclear if patient actually taking medications as prescribed.   - IV lasix 80mg given in ED  Will continue lasix 80mg IV BID  - strict in/out  following UOP  - ECHO ordered

## 2017-09-15 NOTE — PT/OT/SLP PROGRESS
Occupational Therapy      Williams Baldo  MRN: [id number]    Patient not seen today secondary to WILLY in HD  .     1409: Pt WILLY for echo    Will follow-up as available.    Lona Galvez OT  9/15/2017

## 2017-09-15 NOTE — PLAN OF CARE
Problem: Patient Care Overview  Goal: Plan of Care Review  Outcome: Ongoing (interventions implemented as appropriate)  Pt's SpO2 96% on RA. No adverse reactions to aerosol tx. No respiratory distress noted. Continue to monitor SpO2.

## 2017-09-15 NOTE — CONSULTS
[facility]  Nephrology  Consult Note    Patient Name: Williams Bland  MRN: [id number]  Admission Date: 9/14/2017  Hospital Length of Stay: 1 days  Attending Provider: Susy Vincent MD   Primary Care Physician: Ronan Caldwell MD  Principal Problem:Acute on chronic systolic heart failure    Inpatient consult to [facility]  Consult performed by: TERESA PARKS  Consult ordered by: DYASI PONCE  Reason for consult: dialysis        Subjective:     HPI: 66yo M with PMH ESRD on HD MWF at [facility] with Dr Cadet who presented to our facility with c/o worsening edema and feeling like he had fluid on despite attending all of his dialysis sessions, did not discontinue dialysis early and did not eat anything high in salt that he can recall.  He reports drinking about 1.5L of water per day.  Per ED, patient was also noted to be somnolent, confused and SOB. He received narcan by EMS with some improvement in respiratory status but not in mentation.  There was some question as to how much of his pain medications that he had been taken, pills were in disarray per ED reports.  He denies any c/o to me at this time except some edema and abdominal fullness.    Past Medical History:   Diagnosis Date    CHF (congestive heart failure)     Coronary artery disease     CVA (cerebral vascular accident) 2013    Diabetes mellitus     ESRD (end stage renal disease)     Hypertension     Stroke        Past Surgical History:   Procedure Laterality Date    CARDIAC SURGERY      PTCA WITH STENT PLACEMENT    THYROID SURGERY         Review of patient's allergies indicates:  No Known Allergies  Current Facility-Administered Medications   Medication Frequency    albuterol nebulizer solution 2.5 mg Q8H    allopurinol tablet 100 mg QAM    aspirin EC tablet 81 mg Daily    carvedilol tablet 3.125 mg BID WM    clopidogrel tablet 75 mg Daily    dextrose 50% injection 12.5 g  PRN    dextrose 50% injection 25 g PRN    furosemide injection 80 mg QID    glucagon (human recombinant) injection 1 mg PRN    glucose chewable tablet 16 g PRN    glucose chewable tablet 24 g PRN    insulin aspart pen 0-5 Units QID (AC + HS) PRN    insulin detemir pen 5 Units QHS    levothyroxine tablet 125 mcg Before breakfast    pantoprazole EC tablet 40 mg Daily    pregabalin capsule 75 mg BID    sevelamer carbonate tablet 800 mg TID WM     Family History     Problem Relation (Age of Onset)    Hypertension Father        Social History Main Topics    Smoking status: Former Smoker     Quit date: 9/22/2000    Smokeless tobacco: Never Used    Alcohol use No    Drug use: No    Sexual activity: Not Currently     Review of Systems   All other systems reviewed and are negative.    Objective:     Vital Signs (Most Recent):  Temp: 98.6 °F (37 °C) (09/15/17 0807)  Pulse: 80 (09/15/17 0807)  Resp: 19 (09/15/17 0807)  BP: 111/64 (09/15/17 0807)  SpO2: 96 % (09/15/17 0724)  O2 Device (Oxygen Therapy): room air (09/15/17 0724) Vital Signs (24h Range):  Temp:  [97.1 °F (36.2 °C)-98.6 °F (37 °C)] 98.6 °F (37 °C)  Pulse:  [57-80] 80  Resp:  [17-20] 19  SpO2:  [95 %-98 %] 96 %  BP: (102-122)/(62-98) 111/64     Weight: 77.6 kg (171 lb 1.2 oz) (09/15/17 0400)  Body mass index is 23.86 kg/m².  Body surface area is 1.97 meters squared.    I/O last 3 completed shifts:  In: 125 [P.O.:125]  Out: 75 [Urine:75]    Physical Exam   Constitutional: He is oriented to person, place, and time. He appears well-developed and well-nourished. No distress.   HENT:   Head: Normocephalic and atraumatic.   Eyes: EOM are normal. No scleral icterus.   Cardiovascular: Normal rate, regular rhythm and normal heart sounds.  Exam reveals no friction rub.    No murmur heard.  AMARI AVF   Pulmonary/Chest: Effort normal and breath sounds normal. No respiratory distress. He has no wheezes. He has no rales.   Abdominal: Soft. He exhibits no distension.  There is no tenderness.   Musculoskeletal: He exhibits no edema or deformity.   Neurological: He is alert and oriented to person, place, and time.   Skin: Skin is warm and dry. No rash noted. He is not diaphoretic.   Psychiatric: He has a normal mood and affect. His behavior is normal.   Nursing note and vitals reviewed.      Significant Labs:  CBC:   Recent Labs  Lab 09/15/17  0514   WBC 6.39   RBC 4.59*   HGB 11.6*   HCT 35.0*   *   MCV 76*   MCH 25.3*   MCHC 33.1     CMP:   Recent Labs  Lab 09/15/17  0514   *   CALCIUM 8.4*   ALBUMIN 2.9*   PROT 5.9*   *   K 5.7*   CO2 24   CL 94*   BUN 59*   CREATININE 5.7*   ALKPHOS 318*   ALT 88*   AST 70*   BILITOT 0.6     All labs within the past 24 hours have been reviewed.    Significant Imaging:  CXR 9/14/17: There is no midline shift, hydrocephalus, or mass effect.  There is no evidence of acute intracranial hemorrhage or acute major vascular territory infarct.  There are no abnormal extra-axial fluid collections.  There is no evidence for space-occupying mass.  The calvarium is intact.  The visualized paranasal sinuses and mastoid air cells are clear.    Assessment/Plan:     Active Diagnoses:    Diagnosis Date Noted POA    PRINCIPAL PROBLEM:  Acute on chronic systolic heart failure [I50.23] 04/19/2017 Yes    Swelling [R60.9] 09/14/2017 Yes    Serum potassium elevated [E87.5] 03/11/2016 Yes    Essential hypertension [I10]  Yes    ESRD on hemodialysis [N18.6, Z99.2]  Not Applicable    Anemia [D64.9] 12/08/2014 Yes    Hyperlipidemia LDL goal <70 [E78.5] 09/18/2014 Yes    Hypothyroid [E03.9] 09/18/2014 Yes    Uncontrolled type 2 diabetes mellitus with chronic kidney disease on chronic dialysis, with long-term current use of insulin [E11.22, E11.65, N18.6, Z99.2, Z79.4] 06/02/2014 Not Applicable    Elevated troponin [R74.8] 05/30/2014 Yes    CAD (coronary artery disease) [I25.10] 05/08/2014 Yes      Problems Resolved During this Admission:     Diagnosis Date Noted Date Resolved POA       1. ESRD - HD today.  Usual HD MWF with Dr Cadet at [facility]  - HD today and plan for another HD session tomorrow  - renally dose medications for dialysis  - hyperkalemia - HD today, BMP in AM  - hyponatremia - HD today, BMP in AM  2. HTN - marginal BP, unable to tolerate much UF today, may require further UF session tomorrow  3. Anemia of chronic kidney disease - no indication for epo at this time, monitor CBC  4. MBD/secondary hyperparathyroidism - HD today, continue sevelamer, titrate as needed  5. Access - AMARI AVF  6. Nutrition - alb 2.9, nepro, renal diet, renal vitamins      Thank you for allowing me to participate in the care of your patient.  Please call with any questions.    Date of service: 9/15/2017  Cassandra Malone MD   Nephrology  [facility]  Office [phone]   [facility] covering for:  [facility]  RAVI Cadet MD, SKYLER SERRANO MD,   MD ASPEN [place], NP  [address]  [place], [address]  [phone]  After hours answering service: [phone]

## 2017-09-15 NOTE — PLAN OF CARE
Patient is fairly independent however he does not drive and has a prothetic Left BKA.  He uses a rollator and wheelchair when necessary but mostly walker at home.  Expecting DC tomorrow, wife will come and drive him home, lives with supportive spouse. HD patient Sherlyn Bass. Patient has HH through [facility].    Follow-up With  Details  Why  Contact Info   Ronan Caldwell MD  Go on 9/28/2017  @ 2p  [address]  [address]  [place] [address]  [phone]   [facility]      [address]  [place] [address]  [phone]             09/15/17 1450   Discharge Assessment   Assessment Type Discharge Planning Assessment   Confirmed/corrected address and phone number on facesheet? Yes   Assessment information obtained from? Patient   Expected Length of Stay (days) 2   Communicated expected length of stay with patient/caregiver yes   Prior to hospitilization cognitive status: Alert/Oriented   Prior to hospitalization functional status: Assistive Equipment;Needs Assistance   Current cognitive status: Alert/Oriented   Current Functional Status: Assistive Equipment;Needs Assistance   Facility Arrived From: home   Lives With spouse   Able to Return to Prior Arrangements yes   Is patient able to care for self after discharge? Yes   Who are your caregiver(s) and their phone number(s)? Robyn Bland Spouse [phone]    Patient's perception of discharge disposition home or selfcare   Readmission Within The Last 30 Days no previous admission in last 30 days   Patient currently being followed by outpatient case management? No   Patient currently receives any other outside agency services? Yes   Name and contact number of agency or person providing outside services Home Health, patient does not know name of company, but they come twice a week.   Is it the patient/care giver preference to resume care with the current outside agency? Yes    Equipment Currently Used at Home wheelchair;bedside commode;rollator;glucometer;prosthesis;oxygen  (oxygen concentrator and portable tank)   Do you have any problems affording any of your prescribed medications? No   Is the patient taking medications as prescribed? yes   Does the patient have transportation home? Yes   Transportation Available family or friend will provide   Dialysis Name and Scheduled days [facility] 9am   Does the patient receive services at the [facility]? No   Discharge Plan A Home with family;Home Health   Discharge Plan B Home with family   Patient/Family In Agreement With Plan yes   Does the patient have transportation to healthcare appointments? Yes   Readmission Questionnaire   Living Arrangements mobile home

## 2017-09-15 NOTE — PLAN OF CARE
Problem: Occupational Therapy Goal  Goal: Occupational Therapy Goal  Goals to be met by: 10/15/17     Patient will increase functional independence with ADLs by performing:    LE Dressing with Supervision.  Toileting from bedside commode with Supervision for hygiene and clothing management.   Stand pivot transfers with Supervision.  Toilet transfer to bedside commode with Supervision.  Increased functional strength to [facility] for self care skills and functional mobility.  Upper extremity exercise program x10 reps per handout, with independence.    Outcome: Ongoing (interventions implemented as appropriate)  Pt would benefit from cont OT services in order to maximize functional independence. Recommending resume HHOT/PT at d/c

## 2017-09-15 NOTE — ASSESSMENT & PLAN NOTE
/62  Will hold bp meds for now and monitor  Home medications: coreg 25bid, amlodipine 5qday, lisinopril 20qday, imdur 60day

## 2017-09-16 NOTE — ASSESSMENT & PLAN NOTE
- Echo done yesterday showed EF of 20%  - Will consult Cardiology, appreciate recs   - CXR: blunting of the right costophrenic angle with pleural fluid versus pleural thickening and likely a small amount of pleural fluid, consistent with increased volume.   - BNP > 4900  - patient states out of home lasix 80 mg Qday x 2 days but lasix was present in his medication bag, unclear if patient actually taking medications as prescribed.   - IV lasix 80mg given in ED  Continuing lasix 80mg IV QID  - strict in/out  following UOP

## 2017-09-16 NOTE — HOSPITAL COURSE
Pt was seen and examined. He says his SOB has improved. He was complaining of pain in his amputated right knee and later he was complaining of itchiness which had been resolved when seen this AM.

## 2017-09-16 NOTE — ASSESSMENT & PLAN NOTE
- HD MWF at [facility] in [place]  - Per patient last HD 2 weeks ago  - Nephro consulted, recs appreciated, getting HD here  - BUN/Cr pending this AM

## 2017-09-16 NOTE — PT/OT/SLP PROGRESS
Physical Therapy  Treatment    Willaims Bland   MRN: [id number]   Admitting Diagnosis: Acute on chronic systolic heart failure    PT Received On: 09/16/17  PT Start Time: 1525     PT Stop Time: 1548    PT Total Time (min): 23 min       Billable Minutes:  Therapeutic Activity 23    Treatment Type: Treatment  PT/PTA: PTA     PTA Visit Number: 1       General Precautions: Standard, fall  Orthopedic Precautions:  (R BKA)   Braces: N/A    Do you have any cultural, spiritual, Yazdanism conflicts, given your current situation?: none verbalized to PT    Subjective:  Communicated with nurse prior to session. Pt agreed to treatment with some encouragement. Asked PTA if I had his discharge papers.           Objective:   Patient found with: telemetry    Functional Mobility:  Bed Mobility:   Rolling/Turning to Left: Supervision  Rolling/Turning Right: Supervision  Scooting/Bridging: Supervision  Supine to Sit: Supervision  Sit to Supine: Supervision    Transfers:  Sit <> Stand Assistance: Contact Guard Assistance  Sit <> Stand Assistive Device: Rolling Walker    Gait:   Gait Distance: Activity did not occur        Balance:   Static Sit: GOOD+: Takes MAXIMAL challenges from all directions.    Dynamic Sit: GOOD+: Maintains balance through MAXIMAL excursions of active trunk motion  Static Stand: FAIR: Maintains without assist but unable to take challenges  Dynamic stand: POOR: N/A     Therapeutic Activities and Exercises:  All transfers with assistance as documented above. Pt sat at EOB ~10 minutes. Performed 3 sit to stand transfers with RW and CGA plus verbal cueing for proper execution of transfer for safety. Unable to perform ambulation training secondary to not having prosthesis. Pt unable to side step to HOB when asked by PTA. Requested to stay seated at EOB post treatment. Nursing notified and PCT came in room to sit with pt.      AM-PAC 6 CLICK MOBILITY  How much help from another person does this patient currently need?   1 =  Unable, Total/Dependent Assistance  2 = A lot, Maximum/Moderate Assistance  3 = A little, Minimum/Contact Guard/Supervision  4 = None, Modified [place]/Independent    Turning over in bed (including adjusting bedclothes, sheets and blankets)?: 4  Sitting down on and standing up from a chair with arms (e.g., wheelchair, bedside commode, etc.): 3  Moving from lying on back to sitting on the side of the bed?: 4  Moving to and from a bed to a chair (including a wheelchair)?: 3  Need to walk in hospital room?: 1  Climbing 3-5 steps with a railing?: 1  Total Score: 16         Patient left seated at EOB with PCT present with all lines intact, call button in reach, nurse notified and PCT present.    Assessment:  Williams Bland is a 67 y.o. male with a medical diagnosis of Acute on chronic systolic heart failure.  Pt required some encouragement to agree to treatment. Unable to perform any functional gait training as pt's prosthesis is not at hospital. Pt unable to perform any side stepping with left at this time when asked by PTA. Will benefit from continued PT to encourage ambulation with or without prosthesis using RW and to address all impairments listed below.         Rehab identified problem list/impairments: Rehab identified problem list/impairments: weakness, impaired endurance, impaired self care skills, impaired functional mobilty, gait instability, decreased safety awareness    Rehab potential is fair.    Activity tolerance: Poor    Discharge recommendations:       Barriers to discharge: Barriers to Discharge: Inaccessible home environment, Decreased caregiver support    Equipment recommendations:       GOALS:    Physical Therapy Goals        Problem: Physical Therapy Goal    Goal Priority Disciplines Outcome Goal Variances Interventions   Physical Therapy Goal     PT/OT, PT Ongoing (interventions implemented as appropriate)     Description:  Goals to be met by: 10/15/17     Patient will increase functional  independence with mobility by performin. Sit to stand transfer with Modified [place] withRW  2. Bed to chair transfer with Modified [place] using Rolling Walker  3. Lower extremity exercise program x10 reps per handout, with independence                      PLAN:    Patient to be seen 6 x/week  to address the above listed problems via gait training, therapeutic activities, therapeutic exercises  Plan of Care expires: 10/15/17  Plan of Care reviewed with: patient         Anne PHILLIPS Nithin, PTA  2017

## 2017-09-16 NOTE — NURSING
BP=85/56,HR=57 pt asymptomatic. Jessica Dang was notified. Plan will hold coreg and lasix  Scheduled for this AM. Will continue to monitor.

## 2017-09-16 NOTE — PLAN OF CARE
Problem: Physical Therapy Goal  Goal: Physical Therapy Goal  Goals to be met by: 10/15/17     Patient will increase functional independence with mobility by performin. Sit to stand transfer with Modified Placer withRW  2. Bed to chair transfer with Modified Placer using Rolling Walker  3. Lower extremity exercise program x10 reps per handout, with independence     Outcome: Ongoing (interventions implemented as appropriate)         Pt continues to work toward achieving established goals.

## 2017-09-16 NOTE — ASSESSMENT & PLAN NOTE
BP stable  Home medications: coreg 25 bid, amlodipine 5 qday, lisinopril 20 qday, imdur   Continue home coreg, withholding some home meds due to low normal bp

## 2017-09-16 NOTE — SUBJECTIVE & OBJECTIVE
Review of Systems   Constitutional: Negative for activity change, chills, fatigue and fever.   HENT: Negative for congestion, rhinorrhea and sore throat.    Eyes: Negative for visual disturbance.   Respiratory: Negative for cough, chest tightness and shortness of breath.    Cardiovascular: Negative for chest pain, palpitations and leg swelling.   Gastrointestinal: Positive for abdominal distention. Negative for abdominal pain, constipation, diarrhea, nausea and vomiting.   Genitourinary: Negative for dysuria and hematuria.   Musculoskeletal: Negative for arthralgias and myalgias.   Skin: Negative for rash.   Neurological: Negative for dizziness, light-headedness and headaches.   Psychiatric/Behavioral: Negative for agitation. The patient is not nervous/anxious.      Objective:     Vital Signs (Most Recent):  Temp: 98.4 °F (36.9 °C) (09/16/17 1245)  Pulse: (!) 59 (09/16/17 1339)  Resp: 19 (09/16/17 1339)  BP: 101/64 (09/16/17 1245)  SpO2: 98 % (09/16/17 1629) Vital Signs (24h Range):  Temp:  [98 °F (36.7 °C)-98.5 °F (36.9 °C)] 98.4 °F (36.9 °C)  Pulse:  [56-85] 59  Resp:  [16-20] 19  SpO2:  [95 %-98 %] 98 %  BP: ()/(46-66) 101/64     Weight: 77.6 kg (171 lb 1.2 oz)  Body mass index is 23.86 kg/m².    Intake/Output Summary (Last 24 hours) at 09/16/17 1808  Last data filed at 09/16/17 1245   Gross per 24 hour   Intake             1170 ml   Output             1500 ml   Net             -330 ml      Physical Exam   Constitutional: He is oriented to person, place, and time. He appears well-developed and well-nourished. No distress.   HENT:   Head: Normocephalic and atraumatic.   Mouth/Throat: Oropharynx is clear and moist.   Eyes: Conjunctivae and EOM are normal. Pupils are equal, round, and reactive to light.   Neck: Normal range of motion. Neck supple.   Cardiovascular: Normal rate, regular rhythm, normal heart sounds and intact distal pulses.    Pulmonary/Chest: Effort normal and breath sounds normal.    Abdominal: Soft. Bowel sounds are normal. He exhibits distension. There is no tenderness. There is no guarding.   abdominal hernia   Musculoskeletal: Normal range of motion. He exhibits deformity (right bka). He exhibits no edema (2+ lower extremity) or tenderness.   Neurological: He is alert and oriented to person, place, and time. He has normal reflexes. No cranial nerve deficit.   Skin: Skin is warm and dry. He is not diaphoretic.   Psychiatric: He has a normal mood and affect. His behavior is normal. Judgment and thought content normal.       Significant Labs:   A1C:   Recent Labs  Lab 07/18/17  0501   HGBA1C >14.0*     Blood Culture: No results for input(s): LABBLOO in the last 48 hours.  CBC:   Recent Labs  Lab 09/15/17  0514 09/16/17  0528   WBC 6.39 5.34   HGB 11.6* 11.7*   HCT 35.0* 36.1*   * 131*     CMP:   Recent Labs  Lab 09/15/17  0514 09/15/17  1548 09/16/17  0941   * 135* 132*   K 5.7* 4.7 5.1   CL 94* 99 98   CO2 24 26 24   * 289* 239*   BUN 59* 37* 48*   CREATININE 5.7* 4.1* 5.0*   CALCIUM 8.4* 8.6* 8.4*   PROT 5.9* 5.8* 5.9*   ALBUMIN 2.9* 3.1* 3.2*   BILITOT 0.6 0.7 0.6   ALKPHOS 318* 310* 308*   AST 70* 63* 44*   ALT 88* 77* 70*   ANIONGAP 10 10 10   EGFRNONAA 10* 14* 11*     Lactic Acid: No results for input(s): LACTATE in the last 48 hours.  Troponin:   Recent Labs  Lab 09/14/17  2341 09/15/17  0514   TROPONINI 1.109* 0.950*       Significant Imaging: I have reviewed all pertinent imaging results/findings within the past 24 hours.

## 2017-09-16 NOTE — PLAN OF CARE
Problem: Patient Care Overview  Goal: Plan of Care Review  Outcome: Ongoing (interventions implemented as appropriate)  Plan of care reviewed with patient. Patient verbalized complete understanding. Fall/safety precautions maintained. Slip resistant socks on. Bed in lowest position, locked, call light within reach. Bed alarm on, side rails up x's 2. Nurse instructed patient to notify staff for any assistance and pt verbalized complete understanding. Pt turned Q2h. Pt received hydrocodone-acetaminophen in PM for complaints of right knee pain. Pt also received hydroxyzine HCL for complaints of itching. No acute distress noted, will continue to monitor.       Pt on telemetry, no ectopy or true red alarms noted. SR, HR 60's

## 2017-09-16 NOTE — PROCEDURES
Pt seen and examined on HD, tolerating procedure well  Review of Systems   Constitutional: Negative for chills and fever.   Respiratory: Negative for cough and shortness of breath.    Cardiovascular: Negative for chest pain and leg swelling.   Gastrointestinal: Negative for nausea.     QB*, QD*  AP -*  +*  BP * HR *  Physical Exam   Constitutional: He is oriented to person, place, and time and well-developed, well-nourished, and in no distress. No distress.   HENT:   Head: Normocephalic and atraumatic.   Mouth/Throat: Oropharynx is clear and moist.   Eyes: EOM are normal. No scleral icterus.   Neck: Neck supple. No JVD present.   Cardiovascular: Normal rate and regular rhythm.  Exam reveals no friction rub.    Murmur heard.   Diastolic murmur is present with a grade of 2/6   Pulmonary/Chest: Effort normal and breath sounds normal. No respiratory distress. He has no wheezes. He has no rales.   Abdominal: Soft. Bowel sounds are normal. He exhibits no distension. There is no tenderness.   Musculoskeletal: He exhibits edema (2+ pitting BLE).   Neurological: He is alert and oriented to person, place, and time.   Skin: Skin is warm and dry. No rash noted. He is not diaphoretic. No erythema.   Psychiatric: Affect normal.       Recent Labs  Lab 09/15/17  0514 09/16/17  0528   WBC 6.39 5.34   HGB 11.6* 11.7*   HCT 35.0* 36.1*   * 131*       Recent Labs  Lab 09/15/17  1548 09/16/17  0941   * 132*   K 4.7 5.1   CL 99 98   CO2 26 24   BUN 37* 48*   CREATININE 4.1* 5.0*   CALCIUM 8.6* 8.4*     A/P  1. ESRD (N18.6 Z99.2) - usual HD on MWF with Dr. Cadet in [place]    2. HTN (I10) - on coreg 3.125. BP on a low side and not tolerating much UF. Yesterday did not tolerate any UF --> finished net even    3. Anemia of chronic kidney disease treated with FORTUNATO (N18.9 D63.1) - * hb at goal today, hold epo    Recent Labs  Lab 09/14/17  1735 09/15/17  0514 09/16/17  0528   HGB 11.1* 11.6* 11.7*   HCT 34.3* 35.0* 36.1*   PLT  86* 120* 131*     Lab Results   Component Value Date    IRON 46 04/19/2017    TIBC 186 (L) 04/19/2017    FERRITIN 1,866 (H) 04/19/2017       4. MBD (E88.9 M90.80) -    Recent Labs  Lab 09/16/17  0941   CALCIUM 8.4*   PHOS 6.0*       Recent Labs  Lab 09/15/17  0514 09/16/17  0941   MG 1.7 1.7       Binders: increase Sevelamer to 1600    5. Hemodialysis Access (Z99.2 V45.11)- AMARI AVF  6. Nutrition/Hypoalbuminemia (E88.09) -     Recent Labs  Lab 09/15/17  1548 09/16/17  0941   ALBUMIN 3.1* 3.2*     Nepro with meals TID. Renal vitamins daily    7. Acute on Chronic LV and RV systolic HF with severe-to-moderate tricuspid regurge and pulmonary HTN - not tolerating any UF with HD. repeated ECHO pending. Consult Dr. Rick (pt is well known to him)    Thank you for allowing me to participate in care of your patient  With any question please call [phone]  Ebony Alvarez    [facility]  RAVI Cadet MD, SKYLER SERRANO MD,   MD ASPEN [place], ZEESHAN  [address]  [place], [address] [phone]  After hours answering service: [phone]

## 2017-09-16 NOTE — ASSESSMENT & PLAN NOTE
- last a1c >14 (7/2017)  - home medications levemir 10 units Qhs, novolog 5 units TID  - currently on levemir 5 units Qhs and SSI with meals   - POCT glucose TID

## 2017-09-16 NOTE — PROGRESS NOTES
"[facility] Hospital Medicine  Progress Note    Patient Name: Williams Bland  MRN: [id number]  Patient Class: IP- Inpatient   Admission Date: 9/14/2017  Length of Stay: 2 days  Attending Physician: Susy Vincent MD  Primary Care Provider: Ronan Caldwell MD        Subjective:     Principal Problem:Acute on chronic systolic heart failure    HPI:  68 yo AAM with PMH of uncontrolled IDDM (A1c >14 7/18/17), HFrEF (EF 35 3/2017), ESRD (HD MWF), CAD s/p CABG/stents, previous CVA (2013) and right BKA presented to ED c/o increased fluid retention x 3 weeks. Patient states that he told his HD center about the swelling and was told that they would take more fluid off. He states that it is not getting better and that the swelling is now in his stomach stating, "my stomach is rising like bread."  He states that he last had HD yesterday. He states that he takes all of his medication as prescribed but ran out of lasix about 3 days ago.  He denies chest pain, palpations, sob, n/v/d/c, abdominal pain,.dysuria.  His only complaint is leg and abdominal swelling.      Patient brought medication bag with him to ED and when looking through there were some duplicate medications and lasix 80mg was present in the bag. Patient may be taking wrong medications or unaware of amount/dose taken.     Per ED note, patient had increased lethargy/sleepyness, confusion and sob prior to ED arrival. Home health nurse was concerned that patient may have taken medications incorrectly including narcotic pain medications. He was given Narcan by EMS with improvement to mentation and breathing.  On exam, patient was oriented x4,  awake for all of exam, answering appropriately and breathing comfortably on room air.           Hospital Course:  Pt was seen and examined. He says his SOB has improved. He was complaining of pain in his amputated right knee and later he was complaining of itchiness which had been resolved when seen this AM. "         Review of Systems   Constitutional: Negative for activity change, chills, fatigue and fever.   HENT: Negative for congestion, rhinorrhea and sore throat.    Eyes: Negative for visual disturbance.   Respiratory: Negative for cough, chest tightness and shortness of breath.    Cardiovascular: Negative for chest pain, palpitations and leg swelling.   Gastrointestinal: Positive for abdominal distention. Negative for abdominal pain, constipation, diarrhea, nausea and vomiting.   Genitourinary: Negative for dysuria and hematuria.   Musculoskeletal: Negative for arthralgias and myalgias.   Skin: Negative for rash.   Neurological: Negative for dizziness, light-headedness and headaches.   Psychiatric/Behavioral: Negative for agitation. The patient is not nervous/anxious.      Objective:     Vital Signs (Most Recent):  Temp: 98.4 °F (36.9 °C) (09/16/17 1245)  Pulse: (!) 59 (09/16/17 1339)  Resp: 19 (09/16/17 1339)  BP: 101/64 (09/16/17 1245)  SpO2: 98 % (09/16/17 1629) Vital Signs (24h Range):  Temp:  [98 °F (36.7 °C)-98.5 °F (36.9 °C)] 98.4 °F (36.9 °C)  Pulse:  [56-85] 59  Resp:  [16-20] 19  SpO2:  [95 %-98 %] 98 %  BP: ()/(46-66) 101/64     Weight: 77.6 kg (171 lb 1.2 oz)  Body mass index is 23.86 kg/m².    Intake/Output Summary (Last 24 hours) at 09/16/17 1808  Last data filed at 09/16/17 1245   Gross per 24 hour   Intake             1170 ml   Output             1500 ml   Net             -330 ml      Physical Exam   Constitutional: He is oriented to person, place, and time. He appears well-developed and well-nourished. No distress.   HENT:   Head: Normocephalic and atraumatic.   Mouth/Throat: Oropharynx is clear and moist.   Eyes: Conjunctivae and EOM are normal. Pupils are equal, round, and reactive to light.   Neck: Normal range of motion. Neck supple.   Cardiovascular: Normal rate, regular rhythm, normal heart sounds and intact distal pulses.    Pulmonary/Chest: Effort normal and breath sounds normal.    Abdominal: Soft. Bowel sounds are normal. He exhibits distension. There is no tenderness. There is no guarding.   abdominal hernia   Musculoskeletal: Normal range of motion. He exhibits deformity (right bka). He exhibits no edema (2+ lower extremity) or tenderness.   Neurological: He is alert and oriented to person, place, and time. He has normal reflexes. No cranial nerve deficit.   Skin: Skin is warm and dry. He is not diaphoretic.   Psychiatric: He has a normal mood and affect. His behavior is normal. Judgment and thought content normal.       Significant Labs:   A1C:   Recent Labs  Lab 07/18/17  0501   HGBA1C >14.0*     Blood Culture: No results for input(s): LABBLOO in the last 48 hours.  CBC:   Recent Labs  Lab 09/15/17  0514 09/16/17  0528   WBC 6.39 5.34   HGB 11.6* 11.7*   HCT 35.0* 36.1*   * 131*     CMP:   Recent Labs  Lab 09/15/17  0514 09/15/17  1548 09/16/17  0941   * 135* 132*   K 5.7* 4.7 5.1   CL 94* 99 98   CO2 24 26 24   * 289* 239*   BUN 59* 37* 48*   CREATININE 5.7* 4.1* 5.0*   CALCIUM 8.4* 8.6* 8.4*   PROT 5.9* 5.8* 5.9*   ALBUMIN 2.9* 3.1* 3.2*   BILITOT 0.6 0.7 0.6   ALKPHOS 318* 310* 308*   AST 70* 63* 44*   ALT 88* 77* 70*   ANIONGAP 10 10 10   EGFRNONAA 10* 14* 11*     Lactic Acid: No results for input(s): LACTATE in the last 48 hours.  Troponin:   Recent Labs  Lab 09/14/17  2341 09/15/17  0514   TROPONINI 1.109* 0.950*       Significant Imaging: I have reviewed all pertinent imaging results/findings within the past 24 hours.    Assessment/Plan:      * Acute on chronic systolic heart failure    - Echo in process. Last Echo in March 2017 showed EF 35%,   - Echo done yesterday showed EF of 20%  - Will consult Cardiology  - CXR: blunting of the right costophrenic angle with pleural fluid versus pleural thickening and likely a small amount of pleural fluid, consistent with increased volume.   - BNP > 4900  - patient states out of home lasix 80 mg Qday x 2 days but lasix  was present in his medication bag, unclear if patient actually taking medications as prescribed.   - IV lasix 80mg given in ED  Continuing lasix 80mg IV QID  - strict in/out  following UOP          Swelling              Essential hypertension    BP stable  Home medications: coreg 25 bid, amlodipine 5 qday, lisinopril 20 qday, imdur   Continue home coreg, withholding some home meds due to low normal bp        ESRD on hemodialysis    - HD MWF at [facility]  - Per patient last HD 2 weeks ago  - Nephro consulted, recs appreciated.    - BUN/Cr 48/5.0 this AM            Serum potassium elevated    - In ED, K 6.2, treated with albuterol   - Repeat 5.1 this AM.            Anemia    - H/H 11.7/36.1  - Stable. 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, with long-term current use of insulin    - last a1c >14 (7/2017)  - Fasting BG this   - home medications levemir 10 units Qhs, novolog 5 units TID  - currently on levemir 5 units Qhs and SSI with meals   - POCT glucose TID          Elevated troponin    - trop 0.941  - EKG consistent with previous  - Cardiac enzymes trended down  - On aspirin, plavix, holding home lipitor 2/2 to mild elevation in AST/ALT            CAD (coronary artery disease)    - s/p stent and CABG  - continuing plavix, asa  - Trop elevated at admit, 0.941, trended down            VTE Risk Mitigation         Ordered     Medium Risk of VTE  Once      09/14/17 2153     Place sequential compression device  Until discontinued      09/14/17 2153              Wellington Ferrell MD  Department of Hospital Medicine   [facility]

## 2017-09-16 NOTE — PLAN OF CARE
Problem: Hemodialysis (Adult)  Goal: Signs and Symptoms of Listed Potential Problems Will be Absent, Minimized or Managed (Hemodialysis)  Signs and symptoms of listed potential problems will be absent, minimized or managed by discharge/transition of care (reference Hemodialysis (Adult) C[facility]).    09/16/17 1106   Hemodialysis   Problems Assessed (Hemodialysis) all   Problems Present (Hemodialysis) electrolyte imbalance;fluid imbalance   POC discussed with patient  HD with UF today

## 2017-09-16 NOTE — PLAN OF CARE
Problem: Patient Care Overview  Goal: Plan of Care Review  Outcome: Ongoing (interventions implemented as appropriate)  Pt remains free from injury or fall. Dialysis treatment completed. Pt tolerated well. Pain management. Low blood pressure. Will continue to monitor.

## 2017-09-17 PROBLEM — E03.4 HYPOTHYROIDISM DUE TO ACQUIRED ATROPHY OF THYROID: Status: ACTIVE | Noted: 2017-01-01

## 2017-09-17 NOTE — HOSPITAL COURSE
Admitted 9/14/17 for systolic CHF.  Also with Right sided CHF.  Able to tolerate dialysis on 9/16/17 better.

## 2017-09-17 NOTE — HPI
67 year-old male with ischemic cardiomyopathy, EF 20%, PAD, CKD V on HD, admitted with acute on chronic systolic CHF.  Per reports has difficulty tolerating dialysis.  Underwent PCI of RCA at [facility] per Dr. De La Rosa - also with 70% LCX lesion.  No ICD in place.

## 2017-09-17 NOTE — PLAN OF CARE
[place] attempted to set up resumption of care with [facility], they refused to resume care. [place] faxed HH orders to [facility]  [phone] fax [phone], [facility] accepted pt and stated that  nurse will be out to eval pt tomorrow, Monday    No HME ordered    Pt's nurse, Shakira,  will go over medications/signs and symptoms prior to discharge         09/17/17 2464   Final Note   Assessment Type Final Discharge Note   Discharge Disposition Home-Health  ([facility])   What phone number can be called within the next 1-3 days to see how you are doing after discharge? [phone]   Hospital Follow Up  Appt(s) scheduled? No  (offices closed, [place] to follow up)   Right Care Referral Info   Post Acute Recommendation Home-care     Geraldine Wiseman, RN Transitional Navigator  [phone]

## 2017-09-17 NOTE — DISCHARGE INSTRUCTIONS
Heart Failure, Congestive (CHF) (English) View Edit Remove   Heart Failure, Discharge Instructions for (English) View Edit Remove   Heart Failure: Making Changes to Your Diet (English) View Edit Remove   Heart Failure: Medicines to Help Your Heart (English) View Edit Remove     Heart Failure: Warning Signs of a Flare-Up (English) View Edit Remove   Heart Failure: Tracking Your Weight (English) View Edit Remove

## 2017-09-17 NOTE — PLAN OF CARE
Problem: Patient Care Overview  Goal: Plan of Care Review  Outcome: Ongoing (interventions implemented as appropriate)  Plan of care reviewed with patient. Patient verbalized complete understanding. Fall/safety precautions maintained. Slip resistant socks on. Bed in lowest position, locked, call light within reach. Bed alarm on, side rails up x's 2. Nurse instructed patient to notify staff for any assistance and pt verbalized complete understanding. Pt turned Q2h. Pt slept comfortably throughout the night, nonverbal indicators of pain absent. No acute distress noted, will continue to monitor.     Pt on telemetry, no ectopy or true red alarms noted. SB, HR 56-59's

## 2017-09-17 NOTE — CONSULTS
[facility]  Cardiology  Consult Note    Patient Name: Williams Bland  MRN: [id number]  Admission Date: 9/14/2017  Hospital Length of Stay: 3 days  Code Status: Full Code   Attending Provider: Susy Vincent MD   Consulting Provider: Nathanael Mcdowell MD  Primary Care Physician: Ronan Caldwell MD  Principal Problem:Acute on chronic systolic heart failure    Patient information was obtained from patient and ER records.     Consults  Subjective:     Chief Complaint:  Dyspnea     HPI:   67 year-old male with ischemic cardiomyopathy, EF 20%, PAD, CKD V on HD, admitted with acute on chronic systolic CHF.  Per reports has difficulty tolerating dialysis.  Underwent PCI of RCA at [facility] per Dr. De La Rosa - also with 70% LCX lesion.  No ICD in place.      Past Medical History:   Diagnosis Date    CHF (congestive heart failure)     Coronary artery disease     CVA (cerebral vascular accident) 2013    Diabetes mellitus     ESRD (end stage renal disease)     Hypertension     Stroke        Past Surgical History:   Procedure Laterality Date    CARDIAC SURGERY      PTCA WITH STENT PLACEMENT    THYROID SURGERY         Review of patient's allergies indicates:  No Known Allergies    No current facility-administered medications on file prior to encounter.      Current Outpatient Prescriptions on File Prior to Encounter   Medication Sig    albuterol sulfate 2.5 mg/0.5 mL Nebu Take 2.5 mg by nebulization every 8 (eight) hours.    allopurinol (ZYLOPRIM) 100 MG tablet Take 100 mg by mouth every morning.     amlodipine (NORVASC) 10 MG tablet Take 1 tablet (10 mg total) by mouth once daily.    aspirin (ECOTRIN) 81 MG EC tablet Take 1 tablet (81 mg total) by mouth once daily.    atorvastatin (LIPITOR) 40 MG tablet Take 1 tablet (40 mg total) by mouth once daily.    blood sugar diagnostic (CONTOUR TEST STRIPS) Strp USE TO CHECK BLOOD SUGAR THREE TIMES A DAY.    carvedilol (COREG) 3.125 MG tablet Take 1  tablet (3.125 mg total) by mouth 2 (two) times daily with meals.    clopidogrel (PLAVIX) 75 mg tablet Take 1 tablet (75 mg total) by mouth once daily.    collagenase ointment Apply topically once daily.    furosemide (LASIX) 80 MG tablet Take 1 tablet (80 mg total) by mouth once daily. (Patient taking differently: Take 80 mg by mouth. Take 4 times a week on Tuesday, Thursday, Saturday, and Sunday.)    hydrocodone-acetaminophen 5-325mg (NORCO) 5-325 mg per tablet Take 1 tablet by mouth every 6 (six) hours as needed for Pain.    insulin aspart (NOVOLOG) 100 unit/mL InPn pen Inject 5 Units into the skin 3 (three) times daily with meals.    insulin detemir (LEVEMIR FLEXTOUCH) 100 unit/mL (3 mL) SubQ InPn pen Inject 10 Units into the skin every evening.    isosorbide mononitrate (IMDUR) 60 MG 24 hr tablet Take 1 tablet (60 mg total) by mouth once daily.    levothyroxine (SYNTHROID) 125 MCG tablet Take 125 mcg by mouth once daily.    lisinopril (PRINIVIL,ZESTRIL) 20 MG tablet Take 1 tablet (20 mg total) by mouth once daily.    lubiprostone (AMITIZA) 8 MCG Cap Take 8 mcg by mouth 2 (two) times daily.    miconazole (MICOTIN) 2 % cream Apply topically 2 (two) times daily.    oxycodone-acetaminophen (PERCOCET)  mg per tablet Take 1 tablet by mouth every 6 (six) hours as needed for Pain.    pantoprazole (PROTONIX) 40 MG tablet Take 1 tablet by mouth daily.    pregabalin (LYRICA) 75 MG capsule Take 75 mg by mouth 2 (two) times daily. Take before and after dialysis    protein (ENSURE HIGH PROTEIN) Powd Take by mouth 3 (three) times daily. 3 teaspoons PO TID    sevelamer carbonate (RENVELA) 800 mg Tab Take 1 tablet (800 mg total) by mouth 3 (three) times daily with meals.    silver sulfADIAZINE 1% (SILVADENE) 1 % cream Apply 1 application topically 2 (two) times daily.    trazodone (DESYREL) 100 MG tablet Take 1 tablet (100 mg total) by mouth every evening.     Family History     Problem Relation (Age of  Onset)    Hypertension Father        Social History Main Topics    Smoking status: Former Smoker     Quit date: 9/22/2000    Smokeless tobacco: Never Used    Alcohol use No    Drug use: No    Sexual activity: Not Currently     Review of Systems   Constitution: Negative for diaphoresis, weakness, malaise/fatigue, weight gain and weight loss.   HENT: Negative for nosebleeds.    Eyes: Negative for visual disturbance.   Cardiovascular: Positive for dyspnea on exertion and leg swelling. Negative for chest pain, claudication, cyanosis, irregular heartbeat, near-syncope, orthopnea, palpitations, paroxysmal nocturnal dyspnea and syncope.   Respiratory: Negative for cough, hemoptysis, shortness of breath, sleep disturbances due to breathing, snoring, sputum production and wheezing.    Hematologic/Lymphatic: Negative for bleeding problem. Does not bruise/bleed easily.   Skin: Negative for poor wound healing and rash.   Musculoskeletal: Negative for myalgias.   Gastrointestinal: Negative for abdominal pain, heartburn, hematemesis, hematochezia, hemorrhoids and melena.   Neurological: Negative for dizziness, focal weakness, light-headedness and loss of balance.   Psychiatric/Behavioral: Negative for altered mental status, depression and memory loss.     Objective:     Vital Signs (Most Recent):  Temp: 98.1 °F (36.7 °C) (09/17/17 1242)  Pulse: (!) 59 (09/17/17 1242)  Resp: 18 (09/17/17 1242)  BP: (!) 101/52 (09/17/17 1242)  SpO2: 99 % (09/17/17 1216) Vital Signs (24h Range):  Temp:  [97.6 °F (36.4 °C)-98.8 °F (37.1 °C)] 98.1 °F (36.7 °C)  Pulse:  [56-80] 59  Resp:  [16-19] 18  SpO2:  [94 %-99 %] 99 %  BP: (101-120)/(52-68) 101/52     Weight: 75.8 kg (167 lb 1.7 oz)  Body mass index is 23.31 kg/m².    SpO2: 99 %  O2 Device (Oxygen Therapy): room air      Intake/Output Summary (Last 24 hours) at 09/17/17 1300  Last data filed at 09/17/17 1000   Gross per 24 hour   Intake              275 ml   Output                0 ml   Net               275 ml       Lines/Drains/Airways     Drain                 Hemodialysis AV Fistula Left upper arm -- days         Hemodialysis AV Fistula Left upper arm -- days          Peripheral Intravenous Line                 Peripheral IV - Single Lumen 09/14/17 1530 Right Antecubital 2 days                Physical Exam   Constitutional: He is oriented to person, place, and time. He appears well-developed and well-nourished. No distress. He is not intubated.   HENT:   Head: Atraumatic.   Neck: No JVD present.   Cardiovascular: Normal rate, regular rhythm, S1 normal, S2 normal and intact distal pulses.  PMI is not displaced.  Exam reveals gallop and S3. Exam reveals no S4, no distant heart sounds, no friction rub, no midsystolic click and no opening snap.    Murmur heard.  Pulses:       Carotid pulses are 2+ on the right side with bruit, and 2+ on the left side with bruit.       Radial pulses are 2+ on the right side, and 2+ on the left side.        Femoral pulses are 2+ on the right side, and 2+ on the left side.  Pulmonary/Chest: Effort normal and breath sounds normal. No accessory muscle usage. No apnea, no tachypnea and no bradypnea. He is not intubated. No respiratory distress. He has no decreased breath sounds. He has no wheezes. He has no rhonchi. He has no rales. He exhibits no tenderness.   Abdominal: Soft. Normal aorta and bowel sounds are normal. He exhibits no distension, no abdominal bruit, no pulsatile midline mass and no mass. There is no tenderness.   Musculoskeletal: He exhibits no edema.   Neurological: He is alert and oriented to person, place, and time.   Skin: Skin is warm. No rash noted. No erythema. No pallor.   Psychiatric: He has a normal mood and affect. His behavior is normal. Judgment and thought content normal.   Vitals reviewed.      Significant Labs:   BMP:   Recent Labs  Lab 09/15/17  1548 09/16/17  0941 09/17/17  0404   * 239* 330*   * 132* 136   K 4.7 5.1 5.1   CL 99  98 103   CO2 26 24 23   BUN 37* 48* 40*   CREATININE 4.1* 5.0* 4.4*   CALCIUM 8.6* 8.4* 8.4*   MG  --  1.7 1.9   , CMP   Recent Labs  Lab 09/15/17  1548 09/16/17  0941 09/17/17  0404   * 132* 136   K 4.7 5.1 5.1   CL 99 98 103   CO2 26 24 23   * 239* 330*   BUN 37* 48* 40*   CREATININE 4.1* 5.0* 4.4*   CALCIUM 8.6* 8.4* 8.4*   PROT 5.8* 5.9* 6.0   ALBUMIN 3.1* 3.2* 3.3*   BILITOT 0.7 0.6 0.6   ALKPHOS 310* 308* 315*   AST 63* 44* 45*   ALT 77* 70* 63*   ANIONGAP 10 10 10   ESTGFRAFRICA 17* 13* 15*   EGFRNONAA 14* 11* 13*   , CBC   Recent Labs  Lab 09/16/17  0528 09/17/17  0404   WBC 5.34 5.20   HGB 11.7* 11.5*   HCT 36.1* 35.5*   * 98*   , INR No results for input(s): INR, PROTIME in the last 48 hours. and Troponin No results for input(s): TROPONINI in the last 48 hours.    Significant Imaging: Echocardiogram:   2D echo with color flow doppler:   Results for orders placed or performed during the hospital encounter of 09/14/17   2D echo with color flow doppler   Result Value Ref Range    EF 20 (A) 55 - 65    Est. PA Systolic Pressure 15.96     Tricuspid Valve Regurgitation MILD      Assessment and Plan:     * Acute on chronic systolic heart failure    Currently admitted with systolic CHF, likely due to inability to dialyze - patient also with history of missing sessions in past.      For now recommend continuing GDMT's as tolerated (Beta-blocker/ACEi/ARB).  Does not appear to have acute ischemia- although whether LCX needs to be evaluated for PCI may come to fruition.    Also may benefit from ICD.  Of note QRS is wide at 120 msec - borderline criterion for CRT which has potential to improve CHF.  However, will need to clarify patient's long term prognosis.  Typically recommend ICD only if prognosis of > 2 years, which is questionable with his current comorbidities of Ischemic cardiomyopathy, PAD with h/o CLI and amputation, and ESRD on HD.          CAD (coronary artery disease)    Recent PCI of  RCA.  Continue GDMT (DAPT, statin, beta-blocker).  Monitor for ischemia.              VTE Risk Mitigation         Ordered     Medium Risk of VTE  Once      09/14/17 2153     Place sequential compression device  Until discontinued      09/14/17 2153          Thank you for your consult. I will follow-up with patient. Please contact us if you have any additional questions.    Nathanael Mcdowell MD  Cardiology   [facility]

## 2017-09-17 NOTE — NURSING
"Pt refusing vital signs and wishes to be discharged, pt states he "does not want to be here." Therapeutic communication utilized to calm and explain patient plan of care, pt verbalized understanding. No acute distress noted, will continue to monitor.    "

## 2017-09-17 NOTE — SUBJECTIVE & OBJECTIVE
Past Medical History:   Diagnosis Date    CHF (congestive heart failure)     Coronary artery disease     CVA (cerebral vascular accident) 2013    Diabetes mellitus     ESRD (end stage renal disease)     Hypertension     Stroke        Past Surgical History:   Procedure Laterality Date    CARDIAC SURGERY      PTCA WITH STENT PLACEMENT    THYROID SURGERY         Review of patient's allergies indicates:  No Known Allergies    No current facility-administered medications on file prior to encounter.      Current Outpatient Prescriptions on File Prior to Encounter   Medication Sig    albuterol sulfate 2.5 mg/0.5 mL Nebu Take 2.5 mg by nebulization every 8 (eight) hours.    allopurinol (ZYLOPRIM) 100 MG tablet Take 100 mg by mouth every morning.     amlodipine (NORVASC) 10 MG tablet Take 1 tablet (10 mg total) by mouth once daily.    aspirin (ECOTRIN) 81 MG EC tablet Take 1 tablet (81 mg total) by mouth once daily.    atorvastatin (LIPITOR) 40 MG tablet Take 1 tablet (40 mg total) by mouth once daily.    blood sugar diagnostic (CONTOUR TEST STRIPS) Strp USE TO CHECK BLOOD SUGAR THREE TIMES A DAY.    carvedilol (COREG) 3.125 MG tablet Take 1 tablet (3.125 mg total) by mouth 2 (two) times daily with meals.    clopidogrel (PLAVIX) 75 mg tablet Take 1 tablet (75 mg total) by mouth once daily.    collagenase ointment Apply topically once daily.    furosemide (LASIX) 80 MG tablet Take 1 tablet (80 mg total) by mouth once daily. (Patient taking differently: Take 80 mg by mouth. Take 4 times a week on Tuesday, Thursday, Saturday, and Sunday.)    hydrocodone-acetaminophen 5-325mg (NORCO) 5-325 mg per tablet Take 1 tablet by mouth every 6 (six) hours as needed for Pain.    insulin aspart (NOVOLOG) 100 unit/mL InPn pen Inject 5 Units into the skin 3 (three) times daily with meals.    insulin detemir (LEVEMIR FLEXTOUCH) 100 unit/mL (3 mL) SubQ InPn pen Inject 10 Units into the skin every evening.    isosorbide  mononitrate (IMDUR) 60 MG 24 hr tablet Take 1 tablet (60 mg total) by mouth once daily.    levothyroxine (SYNTHROID) 125 MCG tablet Take 125 mcg by mouth once daily.    lisinopril (PRINIVIL,ZESTRIL) 20 MG tablet Take 1 tablet (20 mg total) by mouth once daily.    lubiprostone (AMITIZA) 8 MCG Cap Take 8 mcg by mouth 2 (two) times daily.    miconazole (MICOTIN) 2 % cream Apply topically 2 (two) times daily.    oxycodone-acetaminophen (PERCOCET)  mg per tablet Take 1 tablet by mouth every 6 (six) hours as needed for Pain.    pantoprazole (PROTONIX) 40 MG tablet Take 1 tablet by mouth daily.    pregabalin (LYRICA) 75 MG capsule Take 75 mg by mouth 2 (two) times daily. Take before and after dialysis    protein (ENSURE HIGH PROTEIN) Powd Take by mouth 3 (three) times daily. 3 teaspoons PO TID    sevelamer carbonate (RENVELA) 800 mg Tab Take 1 tablet (800 mg total) by mouth 3 (three) times daily with meals.    silver sulfADIAZINE 1% (SILVADENE) 1 % cream Apply 1 application topically 2 (two) times daily.    trazodone (DESYREL) 100 MG tablet Take 1 tablet (100 mg total) by mouth every evening.     Family History     Problem Relation (Age of Onset)    Hypertension Father        Social History Main Topics    Smoking status: Former Smoker     Quit date: 9/22/2000    Smokeless tobacco: Never Used    Alcohol use No    Drug use: No    Sexual activity: Not Currently     Review of Systems   Constitution: Negative for diaphoresis, weakness, malaise/fatigue, weight gain and weight loss.   HENT: Negative for nosebleeds.    Eyes: Negative for visual disturbance.   Cardiovascular: Positive for dyspnea on exertion and leg swelling. Negative for chest pain, claudication, cyanosis, irregular heartbeat, near-syncope, orthopnea, palpitations, paroxysmal nocturnal dyspnea and syncope.   Respiratory: Negative for cough, hemoptysis, shortness of breath, sleep disturbances due to breathing, snoring, sputum production and  wheezing.    Hematologic/Lymphatic: Negative for bleeding problem. Does not bruise/bleed easily.   Skin: Negative for poor wound healing and rash.   Musculoskeletal: Negative for myalgias.   Gastrointestinal: Negative for abdominal pain, heartburn, hematemesis, hematochezia, hemorrhoids and melena.   Neurological: Negative for dizziness, focal weakness, light-headedness and loss of balance.   Psychiatric/Behavioral: Negative for altered mental status, depression and memory loss.     Objective:     Vital Signs (Most Recent):  Temp: 98.1 °F (36.7 °C) (09/17/17 1242)  Pulse: (!) 59 (09/17/17 1242)  Resp: 18 (09/17/17 1242)  BP: (!) 101/52 (09/17/17 1242)  SpO2: 99 % (09/17/17 1216) Vital Signs (24h Range):  Temp:  [97.6 °F (36.4 °C)-98.8 °F (37.1 °C)] 98.1 °F (36.7 °C)  Pulse:  [56-80] 59  Resp:  [16-19] 18  SpO2:  [94 %-99 %] 99 %  BP: (101-120)/(52-68) 101/52     Weight: 75.8 kg (167 lb 1.7 oz)  Body mass index is 23.31 kg/m².    SpO2: 99 %  O2 Device (Oxygen Therapy): room air      Intake/Output Summary (Last 24 hours) at 09/17/17 1300  Last data filed at 09/17/17 1000   Gross per 24 hour   Intake              275 ml   Output                0 ml   Net              275 ml       Lines/Drains/Airways     Drain                 Hemodialysis AV Fistula Left upper arm -- days         Hemodialysis AV Fistula Left upper arm -- days          Peripheral Intravenous Line                 Peripheral IV - Single Lumen 09/14/17 1530 Right Antecubital 2 days                Physical Exam   Constitutional: He is oriented to person, place, and time. He appears well-developed and well-nourished. No distress. He is not intubated.   HENT:   Head: Atraumatic.   Neck: No JVD present.   Cardiovascular: Normal rate, regular rhythm, S1 normal, S2 normal and intact distal pulses.  PMI is not displaced.  Exam reveals gallop and S3. Exam reveals no S4, no distant heart sounds, no friction rub, no midsystolic click and no opening snap.    Murmur  heard.  Pulses:       Carotid pulses are 2+ on the right side with bruit, and 2+ on the left side with bruit.       Radial pulses are 2+ on the right side, and 2+ on the left side.        Femoral pulses are 2+ on the right side, and 2+ on the left side.  Pulmonary/Chest: Effort normal and breath sounds normal. No accessory muscle usage. No apnea, no tachypnea and no bradypnea. He is not intubated. No respiratory distress. He has no decreased breath sounds. He has no wheezes. He has no rhonchi. He has no rales. He exhibits no tenderness.   Abdominal: Soft. Normal aorta and bowel sounds are normal. He exhibits no distension, no abdominal bruit, no pulsatile midline mass and no mass. There is no tenderness.   Musculoskeletal: He exhibits no edema.   Neurological: He is alert and oriented to person, place, and time.   Skin: Skin is warm. No rash noted. No erythema. No pallor.   Psychiatric: He has a normal mood and affect. His behavior is normal. Judgment and thought content normal.   Vitals reviewed.      Significant Labs:   BMP:   Recent Labs  Lab 09/15/17  1548 09/16/17  0941 09/17/17  0404   * 239* 330*   * 132* 136   K 4.7 5.1 5.1   CL 99 98 103   CO2 26 24 23   BUN 37* 48* 40*   CREATININE 4.1* 5.0* 4.4*   CALCIUM 8.6* 8.4* 8.4*   MG  --  1.7 1.9   , CMP   Recent Labs  Lab 09/15/17  1548 09/16/17  0941 09/17/17  0404   * 132* 136   K 4.7 5.1 5.1   CL 99 98 103   CO2 26 24 23   * 239* 330*   BUN 37* 48* 40*   CREATININE 4.1* 5.0* 4.4*   CALCIUM 8.6* 8.4* 8.4*   PROT 5.8* 5.9* 6.0   ALBUMIN 3.1* 3.2* 3.3*   BILITOT 0.7 0.6 0.6   ALKPHOS 310* 308* 315*   AST 63* 44* 45*   ALT 77* 70* 63*   ANIONGAP 10 10 10   ESTGFRAFRICA 17* 13* 15*   EGFRNONAA 14* 11* 13*   , CBC   Recent Labs  Lab 09/16/17  0528 09/17/17  0404   WBC 5.34 5.20   HGB 11.7* 11.5*   HCT 36.1* 35.5*   * 98*   , INR No results for input(s): INR, PROTIME in the last 48 hours. and Troponin No results for input(s):  TROPONINI in the last 48 hours.    Significant Imaging: Echocardiogram:   2D echo with color flow doppler:   Results for orders placed or performed during the hospital encounter of 09/14/17   2D echo with color flow doppler   Result Value Ref Range    EF 20 (A) 55 - 65    Est. PA Systolic Pressure 15.96     Tricuspid Valve Regurgitation MILD

## 2017-09-17 NOTE — PLAN OF CARE
Problem: Patient Care Overview  Goal: Plan of Care Review  Outcome: Ongoing (interventions implemented as appropriate)  Pt's SpO2 94% on RA. No adverse reactions to aerosol tx. No respiratory distress noted. Continue to monitor SpO2.

## 2017-09-17 NOTE — NURSING
Pt [id number] . Dr. Barboza was notified.. Plan will give 10 units of Novolog Insulin recheck and continue with DC home orders.

## 2017-09-17 NOTE — ASSESSMENT & PLAN NOTE
Currently admitted with systolic CHF, likely due to inability to dialyze - patient also with history of missing sessions in past.      For now recommend continuing GDMT's as tolerated (Beta-blocker/ACEi/ARB).  Does not appear to have acute ischemia- although whether LCX needs to be evaluated for PCI may come to fruition.    Also may benefit from ICD.  Of note QRS is wide at 120 msec - borderline criterion for CRT which has potential to improve CHF.  However, will need to clarify patient's long term prognosis.  Typically recommend ICD only if prognosis of > 2 years, which is questionable with his current comorbidities of Ischemic cardiomyopathy, PAD with h/o CLI and amputation, and ESRD on HD.

## 2017-09-18 NOTE — NURSING
Pt received prescriptions and DC home instructions and verbalized understanding. Cardiac monitor and IV access removed.  Pt denied any discomfort and left unit with staff and wife via wheelchair. No distress noted at this time.

## 2017-09-18 NOTE — PT/OT/SLP DISCHARGE
Physical Therapy Discharge Summary    Williams Bland  MRN: [id number]   Acute on chronic systolic heart failure   Patient Discharged from acute Physical Therapy on 17  Please refer to prior PT noted date on 17 for functional status.     Assessment:   Goals partially met.  GOALS:    Physical Therapy Goals     Not on file          Multidisciplinary Problems (Resolved)        Problem: Physical Therapy Goal    Goal Priority Disciplines Outcome Goal Variances Interventions   Physical Therapy Goal   (Resolved)     PT/OT, PT Outcome(s) achieved     Description:  Goals to be met by: 10/15/17     Patient will increase functional independence with mobility by performin. Sit to stand transfer with Modified Stanley withRW  2. Bed to chair transfer with Modified Stanley using Rolling Walker  3. Lower extremity exercise program x10 reps per handout, with independence                    Reasons for Discontinuation of Therapy Services  Transfer to alternate level of care.      Plan:  Patient Discharged to: Home with Home Health Service.

## 2017-09-18 NOTE — PT/OT/SLP DISCHARGE
Occupational Therapy Discharge Summary    Williams Bland  MRN: [id number]   Acute on chronic systolic heart failure   Patient Discharged from acute Occupational Therapy on 9/17.  Please refer to prior OT note dated on 9/16 for functional status.     Assessment:   Patient appropriate for care in another setting.  GOALS:    Occupational Therapy Goals     Not on file          Multidisciplinary Problems (Resolved)        Problem: Occupational Therapy Goal    Goal Priority Disciplines Outcome Interventions   Occupational Therapy Goal   (Resolved)     OT, PT/OT Outcome(s) achieved    Description:  Goals to be met by: 10/15/17     Patient will increase functional independence with ADLs by performing:    LE Dressing with Supervision.  Toileting from bedside commode with Supervision for hygiene and clothing management.   Stand pivot transfers with Supervision.  Toilet transfer to bedside commode with Supervision.  Increased functional strength to WFL for self care skills and functional mobility.  Upper extremity exercise program x10 reps per handout, with independence.                    Reasons for Discontinuation of Therapy Services  Transfer to alternate level of care.      Plan:  Patient Discharged to: Home with Home Health Service.

## 2017-09-19 NOTE — DISCHARGE SUMMARY
"[facility]  Discharge Summary      Admit Date: 9/14/2017    Discharge Date and Time: 9/17/2017  7:20 PM    Attending Physician: No att. providers found     Reason for Admission: Acute on chronic systolic heart failure    Procedures Performed: * No surgery found *    HPI: 68 yo AAM with PMH of uncontrolled IDDM (A1c >14 7/18/17), HFrEF (EF 35 3/2017), ESRD (HD MWF), CAD s/p CABG/stents, previous CVA (2013) and right BKA presented to ED c/o increased fluid retention x 3 weeks. Patient states that he told his HD center about the swelling and was told that they would take more fluid off. He states that it is not getting better and that the swelling is now in his stomach stating, "my stomach is rising like bread."  He states that he last had HD yesterday. He states that he takes all of his medication as prescribed but ran out of lasix about 3 days ago.  He denies chest pain, palpations, sob, n/v/d/c, abdominal pain,.dysuria.  His only complaint is leg and abdominal swelling.       Patient brought medication bag with him to ED and when looking through there were some duplicate medications and lasix 80mg was present in the bag. Patient may be taking wrong medications or unaware of amount/dose taken.      Hospital Course:   Patient was admitted for Acute CHF exacerbation and elevated troponin BNP with hospital course by problem as listed below.       Acute on chronic systolic heart failure   HTN  HX of CAD s/p stenting  Elevated Troponin     - CXR consistent with blunting of the right costophrenic angle with pleural fluid versus pleural thickening and likely a small amount of pleural fluid, consistent with increased volume and BNP > 4900  Patient stated out of home lasix 80 mg Qday x 2 days but lasix was present in his medication bag, it was unclear if patient actually taking medications as prescribed. However, stated on IV lasix 80mg in ED and continued lasix 80mg IV BID while in hospital. Strict in/out " monitored.   Previous ECHO showed in March 2017 showed EF 35%, repeat obtained and showed EF 20-25%.   - Given hx of cad s/p stenting asa and plavix continued.   - Troponin elevated at 0.941 on admission and was trended with increase to 1.109 before downtrending. EKG consistent with previous.  - Cardiology was consulted continuing DAPT, statin, beta-blocker for CAD and stated that Patient may benefit from ICD but given long term prognosis not indicated at current hospital stay.          ESRD on hemodialysis      - Patient with hx of ESRD HD MWF at [facility] in [place]. Nephro was consulted and HD performed while patient was in hospital.              Uncontrolled type 2 diabetes mellitus with chronic kidney disease on chronic dialysis, with long-term current use of insulin     - last a1c >14 .  - home medications levemir 10 units Qhs, novolog 5 units TID  - currently on levemir 5 units Qhs and SSI with meals   - POCT glucose TID          Hypothyroid     - Continuing home medications, levothyroxine 125mcg  - TSH 1.686 wnl          Essential hypertension     /62  Will hold bp meds for now and monitor  Home medications: coreg 25bid, amlodipine 5qday, lisinopril 20qday, imdur 60day       Serum potassium elevated     In ED, K 6.2, treated with albuterol   K monitored          Consults: cardiology, nephro    Significant Diagnostic Studies:   LABS  CBC    Recent Labs  Lab 09/15/17  0514 09/16/17  0528 09/17/17  0404   WBC 6.39 5.34 5.20   RBC 4.59* 4.72 4.62   HGB 11.6* 11.7* 11.5*   HCT 35.0* 36.1* 35.5*   * 131* 98*   MCV 76* 77* 77*   MCH 25.3* 24.8* 24.9*   MCHC 33.1 32.4 32.4     BMP    Recent Labs  Lab 09/15/17  1548 09/16/17  0941 09/17/17  0404   * 132* 136   K 4.7 5.1 5.1   CO2 26 24 23   CL 99 98 103   BUN 37* 48* 40*   CREATININE 4.1* 5.0* 4.4*   * 239* 330*     POCT-Glucose  POCT Glucose   Date Value Ref Range Status   09/17/2017 274 (H) 70 - 110 mg/dL Final   09/17/2017 417 (H) 70 - 110  mg/dL Final   09/17/2017 396 (H) 70 - 110 mg/dL Final   09/17/2017 335 (H) 70 - 110 mg/dL Final   09/17/2017 308 (H) 70 - 110 mg/dL Final   09/16/2017 374 (H) 70 - 110 mg/dL Final   09/16/2017 360 (H) 70 - 110 mg/dL Final   09/16/2017 210 (H) 70 - 110 mg/dL Final         Recent Labs  Lab 09/15/17  0514 09/15/17  1548 09/16/17  0941 09/17/17  0404   CALCIUM 8.4* 8.6* 8.4* 8.4*   MG 1.7  --  1.7 1.9   PHOS 6.2*  --  6.0* 5.6*     LFT    Recent Labs  Lab 09/15/17  1548 09/16/17  0941 09/17/17  0404   PROT 5.8* 5.9* 6.0   ALBUMIN 3.1* 3.2* 3.3*   BILITOT 0.7 0.6 0.6   AST 63* 44* 45*   ALKPHOS 310* 308* 315*   ALT 77* 70* 63*     CE    Recent Labs  Lab 09/14/17  1523 09/14/17  2341 09/15/17  0514   TROPONINI 0.941* 1.109* 0.950*     BNP    Recent Labs  Lab 09/14/17  1523   BNP >4,900*     LAST HbA1c  Lab Results   Component Value Date    HGBA1C >14.0 (H) 07/18/2017     CXR: Chronic blunting of the right costophrenic angle with pleural fluid versus pleural thickening and likely a small amount of pleural fluid.    US LE: No evidence of deep venous thrombosis bilaterally.    CT Head: No acute intracranial abnormality.    ECHO: CONCLUSIONS     1 - Wall motion abnormalities.     2 - Severely depressed left ventricular systolic function (EF 20-25%).     3 - Normal Mitral valve bioprosthesis.    Final Diagnoses:    Principal Problem: Acute on chronic systolic heart failure   Secondary Diagnoses:   Active Hospital Problems    Diagnosis  POA    *Acute on chronic systolic heart failure [I50.23]  Yes     Priority: 1 - High    ESRD on hemodialysis [N18.6, Z99.2]  Not Applicable     Priority: 1 - High    Elevated troponin [R74.8]  Yes     Priority: 2     Uncontrolled type 2 diabetes mellitus with chronic kidney disease on chronic dialysis, with long-term current use of insulin [E11.22, E11.65, N18.6, Z99.2, Z79.4]  Not Applicable     Priority: 3     Hypothyroid [E03.9]  Yes     Priority: 5     Hypothyroidism due to acquired  atrophy of thyroid [E03.4]  Yes    Serum potassium elevated [E87.5]  Yes    Essential hypertension [I10]  Yes    Anemia [D64.9]  Yes    Hyperlipidemia LDL goal <70 [E78.5]  Yes    Coronary artery disease [I25.10]  Yes     [facility] 5/30/2014 CAD calcified small vessels: LM mild LAD mild proximal disease with very small D1 subtotal diffuse disease   [place] small caliber with mild disease LCx small OM2 and continuation vessels 80-90% RCA dominant with mild disease Renals: no significant stenosis; LV gram with LVEF 35% and 3-4+ MR              Resolved Hospital Problems    Diagnosis Date Resolved POA   No resolved problems to display.       Discharged Condition: good    Disposition: Home or Self Care    Follow Up/Patient Instructions:     Medications:  Reconciled Home Medications:   Discharge Medication List as of 9/17/2017  5:29 PM      CONTINUE these medications which have CHANGED    Details   atorvastatin (LIPITOR) 40 MG tablet Take 1 tablet (40 mg total) by mouth once daily., Starting Sun 9/17/2017, Print      carvedilol (COREG) 3.125 MG tablet Take 1 tablet (3.125 mg total) by mouth 2 (two) times daily with meals., Starting Sun 9/17/2017, Until Mon 9/17/2018, Print      !! clopidogrel (PLAVIX) 75 mg tablet Take 1 tablet (75 mg total) by mouth once daily., Starting Mon 9/18/2017, Until Tue 9/18/2018, Print      furosemide (LASIX) 80 MG tablet Take 1 tablet (80 mg total) by mouth once daily., Starting Sun 9/17/2017, Print      !! levothyroxine (SYNTHROID) 125 MCG tablet Take 1 tablet (125 mcg total) by mouth before breakfast., Starting Mon 9/18/2017, Until Tue 9/18/2018, Print      sevelamer carbonate (RENVELA) 800 mg Tab Take 2 tablets (1,600 mg total) by mouth 3 (three) times daily with meals., Starting Sun 9/17/2017, Until Mon 9/17/2018, Print       !! - Potential duplicate medications found. Please discuss with provider.      CONTINUE these medications which have NOT CHANGED    Details   allopurinol (ZYLOPRIM) 100  MG tablet Take 100 mg by mouth every morning. , Until Discontinued, Historical Med      amlodipine (NORVASC) 10 MG tablet Take 1 tablet (10 mg total) by mouth once daily., Starting Tue 7/18/2017, Print      aspirin (ECOTRIN) 81 MG EC tablet Take 1 tablet (81 mg total) by mouth once daily., Starting 3/9/2017, Until Fri 3/9/18, No Print      blood sugar diagnostic (CONTOUR TEST STRIPS) Strp USE TO CHECK BLOOD SUGAR THREE TIMES A DAY., Starting 9/8/2014, Until Discontinued, Historical Med      !! clopidogrel (PLAVIX) 75 mg tablet Take 1 tablet (75 mg total) by mouth once daily., Starting Tue 7/18/2017, Print      collagenase ointment Apply topically once daily., Starting 7/23/2015, Until Discontinued, Normal      hydrocodone-acetaminophen 5-325mg (NORCO) 5-325 mg per tablet Take 1 tablet by mouth every 6 (six) hours as needed for Pain., Starting 8/20/2015, Until Discontinued, Print      insulin aspart (NOVOLOG) 100 unit/mL InPn pen Inject 5 Units into the skin 3 (three) times daily with meals., Starting 4/19/2017, Until Thu 4/19/18, Print      insulin detemir (LEVEMIR FLEXTOUCH) 100 unit/mL (3 mL) SubQ InPn pen Inject 10 Units into the skin every evening., Starting 4/19/2017, Until Thu 4/19/18, Print      isosorbide mononitrate (IMDUR) 60 MG 24 hr tablet Take 1 tablet (60 mg total) by mouth once daily., Starting 2/9/2017, Until Discontinued, Normal      !! levothyroxine (SYNTHROID) 125 MCG tablet Take 125 mcg by mouth once daily., Until Discontinued, Historical Med      lisinopril (PRINIVIL,ZESTRIL) 20 MG tablet Take 1 tablet (20 mg total) by mouth once daily., Starting Tue 7/18/2017, Print      lubiprostone (AMITIZA) 8 MCG Cap Take 8 mcg by mouth 2 (two) times daily., Until Discontinued, Historical Med      miconazole (MICOTIN) 2 % cream Apply topically 2 (two) times daily., Starting 1/7/2015, Until Discontinued, Print      oxycodone-acetaminophen (PERCOCET)  mg per tablet Take 1 tablet by mouth every 6 (six)  hours as needed for Pain., Until Discontinued, Historical Med      pantoprazole (PROTONIX) 40 MG tablet Take 1 tablet by mouth daily., Normal      pregabalin (LYRICA) 75 MG capsule Take 75 mg by mouth 2 (two) times daily. Take before and after dialysis, Until Discontinued, Historical Med      protein (ENSURE HIGH PROTEIN) Powd Take by mouth 3 (three) times daily. 3 teaspoons PO TID, Until Discontinued, Historical Med      silver sulfADIAZINE 1% (SILVADENE) 1 % cream Apply 1 application topically 2 (two) times daily., Starting 7/16/2015, Until Discontinued, Normal      trazodone (DESYREL) 100 MG tablet Take 1 tablet (100 mg total) by mouth every evening., Normal       !! - Potential duplicate medications found. Please discuss with provider.      STOP taking these medications       albuterol sulfate 2.5 mg/0.5 mL Nebu Comments:   Reason for Stopping:               Discharge Procedure Orders  Ambulatory referral to Home Health   Referral Priority: Routine Referral Type: Home Health   Referral Reason: Specialty Services Required    Requested Specialty: Home Health Services    Number of Visits Requested: 1      Diet general     Diet renal     Diet Diabetic 1800 Calories     Diet Cardiac     Activity as tolerated       Follow-up Information     Ronan Caldwell MD. Go on 9/28/2017.    Why:  @ 2pm  Contact information:  [address]  [address]  [facility] [address]  [phone]           Schedule an appointment as soon as possible for a visit with Nasim Torres MD.    Specialties:  Cardiology, INTERVENTIONAL CARDIOLOGY  Contact information:  [address]  [address]  [facility] [address] [phone]             [facility]..    Specialty:  Home Health Services  Why:  Home Health nurse will contact pt regarding time for initial assessment tomorrow, Monday  Contact information:  [address] [place] [address] [phone]                  > 30 minutes spent on this  discharge.     Amanda Pena D.O.  [facility] HO-2  09/19/2017

## 2017-11-15 NOTE — TELEPHONE ENCOUNTER
Spoke to patients wife.    Patient gets out of dialysis at 2pm.    Rescheduled appt for next week.

## 2017-11-15 NOTE — TELEPHONE ENCOUNTER
----- Message from Rachel Velazquez sent at 11/15/2017  8:30 AM CST -----  Contact: Robyn/wife/[phone]  He has an 11am appt today he is on dialysis and cannot come in today; he needs to be worked in as soon as possible.

## 2017-11-22 PROBLEM — S80.812A ABRASION OF LEFT LOWER EXTREMITY: Status: RESOLVED | Noted: 2017-01-01 | Resolved: 2017-01-01

## 2017-11-23 NOTE — PROGRESS NOTES
Subjective:   Patient ID:  Williams Bland is a 67 y.o. male who presents for follow up of Leg Swelling; Coronary Artery Disease; s/p mitral valve surgery; Congestive Heart Failure; and Peripheral Arterial Disease      HPI:         He presents with his son in law for follow up after more than 2 years. He has 3 vessel CAD s/p RCA PCI, 70% LCX, and patent LAD, ICM with EF 20, BiV failure, bi-atrial enlargement, ESRD on HD MWF, HTN, HLP, PAD, s/p R BKA, venous insufficiency with L leg edema, weight loss due to cardiac cachexia, s/p MVR, DM, hyponatremia, abnormal LFTs, elevated T.Bilirubin from hepatic congestion, BNP >4900 (9/2017), and previous CVA (2013). He is complaining of feeling weak, early satiety, weight loss, and left leg edema. US 9/2017 revealed no DVT. His overall presentation is concerning for severely advanced CHF with multi organ failure.       He is on coreg 3.125 bid, lisinopril 20 mg daily, norvasc 10 mg daily, aspirin, plavix 75, lipitor 40, lasix 80 mg daily and imdur 60 mg daily for cardiovascular care. No ICD because of guarded life expectancy.         Patient Active Problem List    Diagnosis Date Noted    Below knee amputation status, right 11/22/2017    Hypothyroidism due to acquired atrophy of thyroid 09/17/2017    Swelling 09/14/2017    Physical deconditioning 07/18/2017    Weakness 07/17/2017    Hyperglycemia 04/19/2017    Fluid overload 04/19/2017    Thrombocytopenia 04/19/2017    Hypertensive emergency 04/19/2017    Acute on chronic systolic heart failure 04/19/2017    Pulmonary hypertension 04/19/2017    NSTEMI (non-ST elevated myocardial infarction) 03/07/2017    Personal history of noncompliance with medical treatment, presenting hazards to health 03/07/2017     Missed 2 dialysis appointments consecutively.  Now presents with volume overload and  non-ST elevation MI      Serum potassium elevated 03/11/2016    ESRD on hemodialysis     Essential hypertension     Coronary  artery disease involving coronary bypass graft of native heart without angina pectoris 2015    Volume overload 2015    Mitral valve replaced 2015     Mitral valve replacement (Medtronic 31 mm bioprosthetic).      2014          Anemia 2014    Hyperlipidemia LDL goal <70 2014    Hypothyroid 2014    Uncontrolled type 2 diabetes mellitus with chronic kidney disease on chronic dialysis, with long-term current use of insulin 2014    Elevated troponin 2014    Mitral regurgitation 2014     S/p MVR 14 with medtronic 31 mm bioprosthetic valve      Coronary artery disease 2014     C 2014 CAD calcified small vessels: LM mild LAD mild proximal disease with very small D1 subtotal diffuse disease   Ramus small caliber with mild disease LCx small OM2 and continuation vessels 80-90% RCA dominant with mild disease Renals: no significant stenosis; LV gram with LVEF 35% and 3-4+ MR            Cerebrovascular disease 2014           Right Arm BP - Sittin/85  Reason for not completing BP on both arms: AV shunt        LABS  LAST HbA1c  Lab Results   Component Value Date    HGBA1C >14.0 (H) 2017       Lipid panel  Lab Results   Component Value Date    CHOL 79 (L) 2017    CHOL 79 (L) 2015    CHOL 77 (L) 2015     Lab Results   Component Value Date    HDL 41 2017    HDL 30 (L) 2015    HDL 35 (L) 2015     Lab Results   Component Value Date    LDLCALC 28.6 (L) 2017    LDLCALC 35.8 (L) 2015    LDLCALC 34.2 (L) 2015     Lab Results   Component Value Date    TRIG 47 2017    TRIG 66 2015    TRIG 39 2015     Lab Results   Component Value Date    CHOLHDL 51.9 (H) 2017    CHOLHDL 38.0 2015    CHOLHDL 45.5 2015            ROS    Objective:   Physical Exam   Constitutional: He is oriented to person, place, and time. He appears well-developed and well-nourished. He  appears cachectic. He is not intubated.   Muscle wasting       Sitting in a wheelchair   HENT:   Head: Normocephalic and atraumatic.   Right Ear: External ear normal.   Left Ear: External ear normal.   Mouth/Throat: Oropharynx is clear and moist.   Eyes: Conjunctivae and EOM are normal. Pupils are equal, round, and reactive to light. Right eye exhibits no discharge. Left eye exhibits no discharge. No scleral icterus.   Neck: Normal range of motion. Neck supple. Normal carotid pulses, no hepatojugular reflux and no JVD present. Carotid bruit is not present. No tracheal deviation present. No thyromegaly present.   Cardiovascular: Normal rate, regular rhythm, S1 normal and S2 normal.   No extrasystoles are present. PMI is displaced.  Exam reveals no gallop, no S3, no distant heart sounds, no friction rub and no midsystolic click.    Murmur heard.   Systolic murmur is present with a grade of 3/6  at the lower left sternal border radiating to the apex  Pulses:       Carotid pulses are 2+ on the right side, and 2+ on the left side.       Radial pulses are 2+ on the right side, and 2+ on the left side.        Femoral pulses are 2+ on the right side, and 2+ on the left side.       Popliteal pulses are 2+ on the right side, and 2+ on the left side.        Dorsalis pedis pulses are 0 on the left side. Right dorsalis pedis pulse not accessible.        Posterior tibial pulses are 0 on the left side. Right posterior tibial pulse not accessible.     LUE AVF with a thrill      Biphasic L DP and monophasic L PT doppler signals      S/p R BKA   Pulmonary/Chest: Effort normal and breath sounds normal. No accessory muscle usage or stridor. No apnea, no tachypnea and no bradypnea. He is not intubated. No respiratory distress. He has no decreased breath sounds. He has no wheezes. He has no rales. He exhibits no tenderness and no bony tenderness.   Abdominal: He exhibits ascites. He exhibits no distension, no pulsatile liver, no  abdominal bruit, no pulsatile midline mass and no mass. There is no tenderness. There is no rebound and no guarding.   Distended    Musculoskeletal: Normal range of motion. He exhibits no edema or tenderness.   S/p R BKA   Lymphadenopathy:     He has no cervical adenopathy.   3 + L leg edema   Neurological: He is alert and oriented to person, place, and time. He has normal reflexes. No cranial nerve deficit. Coordination normal.   Skin: Skin is warm. No rash noted. No erythema. No pallor.   Psychiatric: His behavior is normal. Judgment and thought content normal. He exhibits a depressed mood.       Assessment:     1. Coronary artery disease involving native coronary artery of native heart without angina pectoris    2. Non-rheumatic mitral regurgitation    3. Mitral valve replaced    4. Swelling    5. PAD (peripheral artery disease)    6. Physical deconditioning    7. Below knee amputation status, right    8. ESRD on hemodialysis    9. Pulmonary hypertension        Plan:           Arterial and venous insufficiency ultrasound  Titrate coreg during next clinic visit  Adjust dry weight        Will review candidacy for ICD  Overall is prognosis is guarded to poor   He has multiple signs indicating worsening CHF    Weight loss    Cachexia    Losing muscle mass    Elevated liver enzymes    Persistently elevated BNP    Early satiety          Low salt diet  Daily weight  Exercise as tolerated        Risk factor modification  DM control            Continue with current medical plan and lifestyle changes.  Return sooner for concerns or questions. If symptoms persist go to the ED  I have reviewed all pertinent data on this patient       I have reviewed the patient's medical history in detail and updated the computerized patient record.    Orders Placed This Encounter   Procedures    US Lower Extremity Venous Insufficiency Left     Standing Status:   Future     Standing Expiration Date:   11/22/2018     Order Specific Question:    May the Radiologist modify the order per protocol to meet the clinical needs of the patient?     Answer:   Yes    US Lower Extremity Arteries Left     Standing Status:   Future     Standing Expiration Date:   11/22/2018     Order Specific Question:   May the Radiologist modify the order per protocol to meet the clinical needs of the patient?     Answer:   Yes    Comprehensive metabolic panel     Standing Status:   Future     Standing Expiration Date:   1/21/2019    CBC auto differential     Standing Status:   Future     Standing Expiration Date:   1/21/2019    B-TYPE NATRIURETIC PEPTIDE     Standing Status:   Future     Standing Expiration Date:   1/21/2019       Follow up as scheduled. Return sooner for concerns or questions  Follow up in 2 to 3 weeks        Greater than 50% of the visit of 45 minutes was spent counseling, educating, and coordinating the care of the patient.  -In today's visit, at least 4 established conditions that pose a risk to life or bodily function have been addressed and the conditions are severe.    -In today's visit, monitoring for drug toxicity was accomplished.                He expressed verbal understanding and agreed with the plan        Patient's Medications   New Prescriptions    No medications on file   Previous Medications    ALLOPURINOL (ZYLOPRIM) 100 MG TABLET    Take 100 mg by mouth every morning.     AMLODIPINE (NORVASC) 10 MG TABLET    Take 1 tablet (10 mg total) by mouth once daily.    ASPIRIN (ECOTRIN) 81 MG EC TABLET    Take 1 tablet (81 mg total) by mouth once daily.    ATORVASTATIN (LIPITOR) 40 MG TABLET    Take 1 tablet (40 mg total) by mouth once daily.    BLOOD SUGAR DIAGNOSTIC (CONTOUR TEST STRIPS) STRP    USE TO CHECK BLOOD SUGAR THREE TIMES A DAY.    CARVEDILOL (COREG) 3.125 MG TABLET    Take 1 tablet (3.125 mg total) by mouth 2 (two) times daily with meals.    CLOPIDOGREL (PLAVIX) 75 MG TABLET    Take 1 tablet (75 mg total) by mouth once daily.     CLOPIDOGREL (PLAVIX) 75 MG TABLET    Take 1 tablet (75 mg total) by mouth once daily.    COLLAGENASE OINTMENT    Apply topically once daily.    FUROSEMIDE (LASIX) 80 MG TABLET    Take 1 tablet (80 mg total) by mouth once daily.    INSULIN ASPART (NOVOLOG) 100 UNIT/ML INPN PEN    Inject 5 Units into the skin 3 (three) times daily with meals.    INSULIN DETEMIR (LEVEMIR FLEXTOUCH) 100 UNIT/ML (3 ML) SUBQ INPN PEN    Inject 10 Units into the skin every evening.    ISOSORBIDE MONONITRATE (IMDUR) 60 MG 24 HR TABLET    Take 1 tablet (60 mg total) by mouth once daily.    LEVOTHYROXINE (SYNTHROID) 125 MCG TABLET    Take 125 mcg by mouth once daily.    LEVOTHYROXINE (SYNTHROID) 125 MCG TABLET    Take 1 tablet (125 mcg total) by mouth before breakfast.    LISINOPRIL (PRINIVIL,ZESTRIL) 20 MG TABLET    Take 1 tablet (20 mg total) by mouth once daily.    LUBIPROSTONE (AMITIZA) 8 MCG CAP    Take 8 mcg by mouth 2 (two) times daily.    MICONAZOLE (MICOTIN) 2 % CREAM    Apply topically 2 (two) times daily.    OXYCODONE-ACETAMINOPHEN (PERCOCET)  MG PER TABLET    Take 1 tablet by mouth every 6 (six) hours as needed for Pain.    PANTOPRAZOLE (PROTONIX) 40 MG TABLET    Take 1 tablet by mouth daily.    PREGABALIN (LYRICA) 75 MG CAPSULE    Take 75 mg by mouth 2 (two) times daily. Take before and after dialysis    PROTEIN (ENSURE HIGH PROTEIN) POWD    Take by mouth 3 (three) times daily. 3 teaspoons PO TID    SEVELAMER CARBONATE (RENVELA) 800 MG TAB    Take 2 tablets (1,600 mg total) by mouth 3 (three) times daily with meals.    SILVER SULFADIAZINE 1% (SILVADENE) 1 % CREAM    Apply 1 application topically 2 (two) times daily.    TRAZODONE (DESYREL) 100 MG TABLET    Take 1 tablet (100 mg total) by mouth every evening.   Modified Medications    Modified Medication Previous Medication    HYDROCODONE-ACETAMINOPHEN 5-325MG (NORCO) 5-325 MG PER TABLET hydrocodone-acetaminophen 5-325mg (NORCO) 5-325 mg per tablet       Take 1 tablet by  mouth every 6 (six) hours as needed for Pain.    Take 1 tablet by mouth every 6 (six) hours as needed for Pain.   Discontinued Medications    No medications on file

## 2017-11-23 NOTE — PATIENT INSTRUCTIONS
Heart Disease Education    The heart beats 60 to 100 times per minute, 24 hours a day. This equals almost 1000,000 times a day. It pumps blood with oxygen and nutrients to the tissues and organs of the body. But the heart is a muscle and needs its own supply of blood. Blood flow to the heart is supplied by the coronary arteries. Coronary artery disease (atherosclerosis) is a result of cholesterol, saturated fat, and calcium deposits (plaques) that build up inside the walls. This causes inflammation within the coronary arteries. These plaques narrow the artery and reduce blood flow to the heart muscle. The reduction in blood flow to the heart muscle decreases oxygen supply to the heart. If the narrowing is significant enough, the oxygen supply to one or more regions of the heart can be temporarily or permanently shut down. This can cause chest pain, and possibly death of heart tissue (heart attack).  Types of chest pain  Angina is the name for pain in the heart muscle. Angina is a warning sign of serious heart disease. When untreated it can lead to a heart attack, also known as acute myocardial infarction, or AMI. Angina occurs when there is not enough blood and oxygen flowing to the heart for the amount of work it is doing. This most often happens during physical exertion, when the heart is working hardest. It is usually relieved by rest or nitroglycerin. Angina may also occur after a large meal when extra blood is sent to the digestive organs and less goes to the heart. In the case of advanced or unstable heart disease, angina can occur at rest or awaken you from sleep. Angina usually lasts from a few minutes up to 20 minutes or more. When treated early, the effects of angina can be reversed without permanent damage to the heart. Angina is a serious condition and needs to be evaluated by a medical professional immediately.  There are two types of angina -- stable and unstable:  · Stable angina usually occurs  with a predictable level of activity. Being stable, its character, severity, and occurrence do not change much over time. It usually starts with activity, and resolves with rest or taking your medicine as instructed by your doctor. The symptoms usually do not last long.  · Unstable angina changes or gets worse over time. It is different from whatever you are used to. It may feel different or worse, begin without cause, occur with exercise or exertion, wake you up from sleep, and last longer. It may not respond in the same way as it does when you take your usual medicines for an attack. This type of angina can be a warning sign of an impending heart attack.     A heart attack is usually the result of a blood clot that suddenly forms in a coronary artery that has been narrowed with plaque. When this occurs, blood flow may be cut off to a part of the heart muscle, causing the cells to die. This weakens the pumping action of the heart, which affects the delivery of blood to all the other organs in the body including the brain. This damage is not reversible. However, early treatment can limit the amount of damage.  The pain you feel with angina and a heart attack may have a similar quality. However, it is usually different in intensity and duration. Here are some typical descriptions of a heart attack:  · It is most often experienced as a squeezing, crushing, pressure-like sensation in the center of the chest.  · It is sometimes described as something heavy sitting on my chest.  · It may feel more like a bad case of indigestion.  · The pain may spread from the chest to the arm, shoulder, throat or jaw.  · Sometimes the pain is not felt in the chest at all, but only in the arm, shoulder, throat or jaw.  · There may also be nausea, vomiting, dizziness or light-headedness, sweating and trouble breathing.  · Palpitations, or your heart beating rapidly  · A new, irregular heart beat  · Unexplained weakness  You may not be  "able to tell the difference between "bad" angina and a heart attack at home. Seek help if your symptoms are different than usual. Do not be in denial or just try to "tough it out."  Call 911  This is the fastest and safest way to get to the emergency department. The paramedics can also start treatment on the way to the hospital, saving valuable time for your heart.  · If the angina gets worse, if it continues, or if it stops and returns, call 911 immediately. Do not delay. You may be having a heart attack.  · After you call 911, take a second tablet or spray unless instructed otherwise. When repeating doses, sit down if possible, because it can make you feel lightheaded or dizzy. Wait another 5 minutes. If the angina still does not go away, take a third tablet or spray. Do not take more than 3 tablets or sprays within 15 minutes. Stay on the phone with 911 for further instruction.  · Your healthcare provider may give you slightly different instructions than those above. If so, follow them carefully.  Do not wait until symptoms become severe to call 911.  Other reasons to call 911 include:  · Trouble breathing  · Feeling lightheaded, faint, or dizzy  · Rapid heart beat  · Slower than usual heart rate compared to your normal  · Angina with weakness, dizziness, fainting, heavy sweating, nausea, or vomiting  · Extreme drowsiness, confusion  · Weakness of an arm or leg or one side of the face  · Difficulty with speech or vision  When to seek medical care  Remember, the signs and symptoms of a heart attack are not always like they are on TV. Sometimes they are not so obvious. You may only feel weak, or just not right. If it is not clear or if you have any doubt, call for advice.  · Seek help if there is a change in the type of pain, if it feels different, or if your symptoms are mild.  · Do not drive yourself. Have someone else drive you. If no one can drive, call 911.  · Do not delay. Fast diagnosis and treatment can " "prevent or limit the amount of heart damage during a heart attack.  · Do not go to your doctor's office or a clinic as they may not be able to provide all the testing and treatment required for this condition.  · If your doctor has given you medicine to take when symptoms occur, take them but don't delay getting help trying to locate medicines.  What happens in the emergency department  The emergency department is connected to your local emergency medical system (EMS) through 911. That's why during a cardiac emergency, calling 911 is the fastest way to get help. The goal of the emergency department is to rapidly screen, evaluate, and treat people.  Once you are there, an electrocardiogram (ECG or heart tracing) will be done. Blood samples may be taken to look for the presence of heart enzymes that leak from damaged heart cells and show if a heart attack is occurring. You will often be evaluated by a heart specialist (cardiologist) who decides the best course of action. In the case of severe angina or early heart attack, and depending on the circumstances, powerful "clot busting" medicines can be used to dissolve blood clots in the coronary artery. In other cases, you may be taken to a cardiac catheterization lab. Here, a tiny balloon-tipped catheter is advanced through blood vessels to the heart. There the balloon is inflated pushing open the blood vessel restoring blood flow.  Risk factors for heart disease  Risk factors for heart disease are a combination of genetic and lifestyle. Many risk factors work by either directly or indirectly damaging the blood vessels of the heart, or by increasing the risk of forming blood or cholesterol clots, which then clog up and block the arteries.     Examples of physical lifestyle risk factors:  · Cigarette smoking  · High blood pressure  · High blood cholesterol  · Use of stimulant drugs such as cocaine, crack, and amphetamines  · Eating a high-fat, high-cholesterol " meal  · Diabetes   · Obesity which increases risk for diabetes and high blood pressure  · Lack of regular physical activity     Examples of emotional lifestyle factors:  · Chronic high stress levels release stress hormones. These raise blood pressure and cholesterol level and makes blood clot more easily.  · Held-in anger, hostile or cynical attitude  · Social and emotional isolation, lack of intimacy  · Loss of relationship  · Depression  Other factors that increase the risk of heart attack that you cannot control :  · Age. The older you get beyond 40, the greater is your risk of significant coronary artery disease.  · Gender. More men than women get heart disease; but once past menopause, women who are not taking estrogen replacement have the same risk as men for a heart attack.  · Family history. If your mother, father, brother or sister has coronary artery disease, your risk of having it is higher than a person your age without this family history.  What can you do to decrease your risk  To reduce your risk of heart disease:  · Get regular checkups with your doctor.  · Take your medicines for blood pressure, cholesterol or diabetes as directed.  · Watch your diet. Eat a heart healthy diet choosing fresh foods, less salt, cholesterol, and fat  · Stop smoking. Get help if needed.  · Get regular exercise.  · Manage stress.  · Carry a list of medicines and doses in your wallet.  Date Last Reviewed: 12/30/2015  © 7663-3275 [employer]. [address] [place] [address], [place] [address]. All rights reserved. This information is not intended as a substitute for professional medical care. Always follow your healthcare professional's instructions.        Left- or Right- Side Congestive Heart Failure (CHF)    The heart is a large muscle. It is a pump that circulates blood throughout the body. Blood carries oxygen to all of the organs, including the brain, muscles, and skin. After your body takes the oxygen out of the  blood, the blood returns to the heart. The right side of the heart collects the blood from the body and pumps it to the lungs. In the lungs, it gets fresh oxygen and gives up carbon dioxide. The oxygen-rich blood from the lungs then returns to the left side of the heart, where it is pumped back out to the rest of your body, starting the process all over.  Congestive heart failure (CHF) occurs when the heart muscle is weakened. This affects the pumping action of the heart. Heart failure can affect the right side of the heart or the left side. But heart failure may affect not only the right side of the heart or only the left side. Although it may have started on one side, it can and often eventually does affect both sides.  Right-side heart failure  When the right side of the heart is weakened, it cant handle the blood it is getting from the rest of the body. This blood returns to the heart through veins. When too much pressure builds up in the veins, fluid leaks out into the tissues. Gravity then causes that fluid to move to those parts of the body that are the lowest. So one of the first symptoms of right-side CHF can include swelling in the feet and ankles. If the condition gets worse, the swelling can even go up past the knees. Sometimes it gets so severe, the liver can get congested as well.  Left-side heart failure  When the left side of the heart is weakened, it cant handle the blood it gets from the lungs. Pressure then builds up in the veins of the lungs, causing fluid to leak into the lung tissues. This may cause CHF and pulmonary edema. This causes you to feel short of breath, weak, or dizzy. These symptoms are often worse with exertion, such as when climbing stairs or walking up hills. Lying with your head flat is uncomfortable and can make your breathing worse. This may make sleeping difficult. You may need to use extra pillows to elevate your upper body to sleep well. The same is true when just resting  during the daytime.  There are many causes of heart failure including:  · Coronary artery disease  · Past heart attack (also known as acute myocardial infarction, or AMI)  · High blood pressure  · Damaged heart valve  · Diabetes  · Obesity  · Cigarette smoking  · Alcohol abuse  Heart failure is a chronic condition. There is no cure. The purpose of medical treatment is to improve the pumping action of the heart. The main way to do this is to remove excess water from the body. A number of medicines can help reach this goal, improve symptoms, and prevent the heart from becoming weaker. Sometimes, heart failure can become so severe that a device is placed in the heart to help with pumping. Another major goal is to better treat the causes of heart failure, such as diabetes and high blood pressure, by making changes in your lifestyle and maximizing medical control when needed.  Home care  Follow these guidelines when caring for yourself at home:  · Check your weight every day. This is very important because a sudden increase in weight gain could mean worsening heart failure. Keep these things in mind:  ¨ Use the same scale every day.  ¨ Weigh yourself at the same time every day.  ¨ Make sure the scale is on a hard floor surface, not on a rug or carpet.  ¨ Keep a record of your weight every day so your healthcare provider can see it. If you are not given a log sheet for this, keep a separate journal for this purpose.   · Cut back on the amount of salt (sodium) you eat. Follow your healthcare provider's recommendation on how much salt or sodium you should have each day.  ¨ Avoid high-salt foods. These include olives, pickles, smoked meats, salted potato chips, and most prepared foods.  ¨ Don't add salt to your food at the table. Use only small amounts of salt when cooking.  ¨ Read the labels carefully on food packages to learn how much salt or sodium is in each serving in the package. Remember, a can or package of food may  contain more than 1 serving. So if you eat all the food in the package, you may be getting more salt than you think.  · Follow your healthcare provider's recommendations about how much fluid you should have. Be aware that some foods, such as soup, pudding, and juicy fruits like oranges or melons, contain liquid. You'll need to count the liquid in those foods as part of your daily fluid intake. Your provider can help you with this.  · Stop smoking.  · Cut back on how much alcohol you drink.  · Lose weight if you are overweight. The excess weight adds a lot of stress on the workload of the heart.  · Stay active. Talk with your provider about an exercise program that is safe for your heart.  · Keep your feet elevated to reduce swelling. Ask your provider about support hose as a preventive treatment for daytime leg swelling.  Besides taking your medicine as instructed, an important part of treatment is lifestyle changes. These include diet, physical activity, stopping smoking, and weight control.  Improve your diet by including more fresh foods, cutting back on how much sugar and saturated fat you eat, and eating fewer processed foods and less salt.  Follow-up care  Follow up with your healthcare provider, or as advised.  Make sure to keep any appointments that were made for you. These can help better control your congestive heart failure. You will need to follow up with your provider on a routine basis to make sure your heart failure is well managed.  If an X-ray, ECG, or other tests were done, you will be told of any new findings that may affect your care.  Call 911  Call 911 if you:  · Become severely short of breath  · Feel lightheaded, or feel like you might pass out or faint  · Have chest pain or discomfort that is different than usual, the medicines your doctor told you to use for this don't help, or the pain lasts longer than 10 to 15 minutes  · You suddenly develop a rapid heart rate  When to seek medical  advice  The following may be signs that your heart failure is getting worse. Call your healthcare provider right away if any of these happen:  · Sudden weight gain. This means 3 or more pounds in one day, or 5 or more pounds in 1 week.  · Trouble breathing not related to being active  · New or increased swelling of your legs or ankles  · Swelling or pain in your abdomen  · Breathing trouble at night. This means waking up short of breath or needing more pillows to breathe.  · Frequent coughing that doesnt go away  · Feeling much more tired than usual  Date Last Reviewed: 1/4/2016  © 6679-5219 [employer]. [address] [place] [address], [place] [address]. All rights reserved. This information is not intended as a substitute for professional medical care. Always follow your healthcare professional's instructions.

## 2017-11-27 NOTE — TELEPHONE ENCOUNTER
----- Message from Steffi Bauer sent at 11/27/2017 10:00 AM CST -----  Contact: Wife Robyn/[phone]  Patient's wife requesting to speak with Dr. Duron. Patient was in dialysis, and was not feeling well so he is now being transported to [facility]. Please call and advise.

## 2017-11-29 PROBLEM — T82.49XA CLOTTED DIALYSIS ACCESS: Status: ACTIVE | Noted: 2017-01-01

## 2017-11-29 NOTE — CONSULTS
Today`s Date: 11/29/2017     Admit Date: 11/29/2017    Admitting Physician: Winston Ruiz MD    Patient`s Name: Williams Bland , 67 y.o. male    Reason for consultation  Clotted av fistula left upper erm   Patient Active Problem List:     Mitral regurgitation     Coronary artery disease     Cerebrovascular disease     Elevated troponin     Uncontrolled type 2 diabetes mellitus with chronic kidney disease on chronic dialysis, with long-term current use of insulin     Hyperlipidemia LDL goal <70     Hypothyroid     Anemia     Mitral valve replaced     Coronary artery disease involving coronary bypass graft of native heart without angina pectoris     Volume overload     Serum potassium elevated     ESRD on hemodialysis     Essential hypertension     NSTEMI (non-ST elevated myocardial infarction)     Personal history of noncompliance with medical treatment, presenting hazards to health     Hyperglycemia     Fluid overload     Thrombocytopenia     Hypertensive emergency     Acute on chronic systolic heart failure     Pulmonary hypertension     Weakness     Physical deconditioning     Swelling     Hypothyroidism due to acquired atrophy of thyroid     Below knee amputation status, right     Clotted dialysis access      Past Medical History:  No date: CHF (congestive heart failure)  No date: Coronary artery disease  2013: CVA (cerebral vascular accident)  No date: Diabetes mellitus  No date: ESRD (end stage renal disease)  No date: Hypertension  No date: Stroke    Past Surgical History:  No date: CARDIAC SURGERY      Comment: PTCA WITH STENT PLACEMENT  No date: THYROID SURGERY    Prior to Admission medications :  Medication allopurinol (ZYLOPRIM) 100 MG tablet, Sig Take 100 mg by mouth every morning. , Start Date , End Date , Taking? , Authorizing Provider Historical Provider, MD    Medication amlodipine (NORVASC) 10 MG tablet, Sig Take 1 tablet (10 mg total) by mouth once daily., Start Date 7/18/17, End Date , Taking? ,  Authorizing Provider Amira Mills MD    Medication aspirin (ECOTRIN) 81 MG EC tablet, Sig Take 1 tablet (81 mg total) by mouth once daily., Start Date 3/9/17, End Date 3/9/18, Taking? , Authorizing Provider Manuel Greenwood MD    Medication atorvastatin (LIPITOR) 40 MG tablet, Sig Take 1 tablet (40 mg total) by mouth once daily., Start Date 9/17/17, End Date , Taking? , Authorizing Provider Erasmo Barboza MD    Medication blood sugar diagnostic (CONTOUR TEST STRIPS) Strp, Sig USE TO CHECK BLOOD SUGAR THREE TIMES A DAY., Start Date 9/8/14, End Date , Taking? , Authorizing Provider Venancio Mane MD    Medication carvedilol (COREG) 3.125 MG tablet, Sig Take 1 tablet (3.125 mg total) by mouth 2 (two) times daily with meals., Start Date 9/17/17, End Date 9/17/18, Taking? , Authorizing Provider Erasmo Barboza MD    Medication clopidogrel (PLAVIX) 75 mg tablet, Sig Take 1 tablet (75 mg total) by mouth once daily., Start Date 7/18/17, End Date , Taking? , Authorizing Provider Amira Mills MD    Medication clopidogrel (PLAVIX) 75 mg tablet, Sig Take 1 tablet (75 mg total) by mouth once daily., Start Date 9/18/17, End Date 9/18/18, Taking? , Authorizing Provider Erasmo Barboza MD    Medication collagenase ointment, Sig Apply topically once daily., Start Date 7/23/15, End Date , Taking? , Authorizing Provider NITESH Comer, ANP    Medication furosemide (LASIX) 80 MG tablet, Sig Take 1 tablet (80 mg total) by mouth once daily., Start Date 9/17/17, End Date , Taking? , Authorizing Provider Erasmo Barboza MD    Medication hydrocodone-acetaminophen 5-325mg (NORCO) 5-325 mg per tablet, Sig Take 1 tablet by mouth every 6 (six) hours as needed for Pain., Start Date 11/22/17, End Date , Taking? , Authorizing Provider Nasim Torres MD    Medication insulin aspart (NOVOLOG) 100 unit/mL InPn pen, Sig Inject 5 Units into the skin 3 (three) times daily with meals., Start Date 4/19/17, End Date  4/19/18, Taking? , Authorizing Provider Edis Cyr MD    Medication insulin detemir (LEVEMIR FLEXTOUCH) 100 unit/mL (3 mL) SubQ InPn pen, Sig Inject 10 Units into the skin every evening., Start Date 4/19/17, End Date 4/19/18, Taking? , Authorizing Provider Edis Cyr MD    Medication isosorbide mononitrate (IMDUR) 60 MG 24 hr tablet, Sig Take 1 tablet (60 mg total) by mouth once daily., Start Date 2/9/17, End Date , Taking? , Authorizing Provider Nasim Torres MD    Medication levothyroxine (SYNTHROID) 125 MCG tablet, Sig Take 125 mcg by mouth once daily., Start Date , End Date , Taking? , Authorizing Provider Historical MD Alhaji    Medication levothyroxine (SYNTHROID) 125 MCG tablet, Sig Take 1 tablet (125 mcg total) by mouth before breakfast., Start Date 9/18/17, End Date 9/18/18, Taking? , Authorizing Provider Erasmo Barboza MD    Medication lisinopril (PRINIVIL,ZESTRIL) 20 MG tablet, Sig Take 1 tablet (20 mg total) by mouth once daily., Start Date 7/18/17, End Date , Taking? , Authorizing Provider Amira Mills MD    Medication lubiprostone (AMITIZA) 8 MCG Cap, Sig Take 8 mcg by mouth 2 (two) times daily., Start Date , End Date , Taking? , Authorizing Provider Historical Provider, MD    Medication miconazole (MICOTIN) 2 % cream, Sig Apply topically 2 (two) times daily., Start Date 1/7/15, End Date , Taking? , Authorizing Provider NITESH Comer, ANP    Medication oxycodone-acetaminophen (PERCOCET)  mg per tablet, Sig Take 1 tablet by mouth every 6 (six) hours as needed for Pain., Start Date , End Date , Taking? , Authorizing Provider Historical Provider, MD    Medication pantoprazole (PROTONIX) 40 MG tablet, Sig Take 1 tablet by mouth daily., Start Date 12/14/16, End Date , Taking? , Authorizing Provider NITESH Comer, ANP    Medication pregabalin (LYRICA) 75 MG capsule, Sig Take 75 mg by mouth 2 (two) times daily. Take before and after dialysis,  Start Date , End Date , Taking? , Authorizing Provider Historical Provider, MD    Medication protein (ENSURE HIGH PROTEIN) Powd, Sig Take by mouth 3 (three) times daily. 3 teaspoons PO TID, Start Date , End Date , Taking? , Authorizing Provider Historical Provider, MD    Medication sevelamer carbonate (RENVELA) 800 mg Tab, Sig Take 2 tablets (1,600 mg total) by mouth 3 (three) times daily with meals., Start Date 9/17/17, End Date 9/17/18, Taking? , Authorizing Provider Erasmo Barboza MD    Medication silver sulfADIAZINE 1% (SILVADENE) 1 % cream, Sig Apply 1 application topically 2 (two) times daily., Start Date 7/16/15, End Date , Taking? , Authorizing Provider Nasim Torres MD    Medication trazodone (DESYREL) 100 MG tablet, Sig Take 1 tablet (100 mg total) by mouth every evening., Start Date 10/27/16, End Date , Taking? , Authorizing Provider Nasim Torres MD      Current Facility-Administered Medications on File Prior to Encounter:  [COMPLETED] insulin regular injection 10 Units, 10 Units, Intravenous, ED 1 Time, Lloyd Jamil MD, 10 Units at 11/28/17 2126  [COMPLETED] sodium chloride 0.9% bolus 1,000 mL, 1,000 mL, Intravenous, ED 1 Time, Lloyd Jamil MD, Stopped at 11/28/17 2134    Current Outpatient Prescriptions on File Prior to Encounter:  allopurinol (ZYLOPRIM) 100 MG tablet, Take 100 mg by mouth every morning. , Disp: , Rfl:   amlodipine (NORVASC) 10 MG tablet, Take 1 tablet (10 mg total) by mouth once daily., Disp: 30 tablet, Rfl: 5  aspirin (ECOTRIN) 81 MG EC tablet, Take 1 tablet (81 mg total) by mouth once daily., Disp: , Rfl: 0  atorvastatin (LIPITOR) 40 MG tablet, Take 1 tablet (40 mg total) by mouth once daily., Disp: 30 tablet, Rfl: 3  blood sugar diagnostic (CONTOUR TEST STRIPS) Strp, USE TO CHECK BLOOD SUGAR THREE TIMES A DAY., Disp: , Rfl:   carvedilol (COREG) 3.125 MG tablet, Take 1 tablet (3.125 mg total) by mouth 2 (two) times daily with meals., Disp: 60 tablet, Rfl: 3  clopidogrel  (PLAVIX) 75 mg tablet, Take 1 tablet (75 mg total) by mouth once daily., Disp: 30 tablet, Rfl: 5  clopidogrel (PLAVIX) 75 mg tablet, Take 1 tablet (75 mg total) by mouth once daily., Disp: 30 tablet, Rfl: 3  collagenase ointment, Apply topically once daily., Disp: 30 g, Rfl: 0  furosemide (LASIX) 80 MG tablet, Take 1 tablet (80 mg total) by mouth once daily., Disp: 90 tablet, Rfl: 3  hydrocodone-acetaminophen 5-325mg (NORCO) 5-325 mg per tablet, Take 1 tablet by mouth every 6 (six) hours as needed for Pain., Disp: 20 tablet, Rfl: 0  insulin aspart (NOVOLOG) 100 unit/mL InPn pen, Inject 5 Units into the skin 3 (three) times daily with meals., Disp: 4.5 mL, Rfl: 11  insulin detemir (LEVEMIR FLEXTOUCH) 100 unit/mL (3 mL) SubQ InPn pen, Inject 10 Units into the skin every evening., Disp: 3 mL, Rfl: 11  isosorbide mononitrate (IMDUR) 60 MG 24 hr tablet, Take 1 tablet (60 mg total) by mouth once daily., Disp: 30 tablet, Rfl: 3  levothyroxine (SYNTHROID) 125 MCG tablet, Take 125 mcg by mouth once daily., Disp: , Rfl:   levothyroxine (SYNTHROID) 125 MCG tablet, Take 1 tablet (125 mcg total) by mouth before breakfast., Disp: 30 tablet, Rfl: 3  lisinopril (PRINIVIL,ZESTRIL) 20 MG tablet, Take 1 tablet (20 mg total) by mouth once daily., Disp: 30 tablet, Rfl: 5  lubiprostone (AMITIZA) 8 MCG Cap, Take 8 mcg by mouth 2 (two) times daily., Disp: , Rfl:   miconazole (MICOTIN) 2 % cream, Apply topically 2 (two) times daily., Disp: 92 g, Rfl: 0  oxycodone-acetaminophen (PERCOCET)  mg per tablet, Take 1 tablet by mouth every 6 (six) hours as needed for Pain., Disp: , Rfl:   pantoprazole (PROTONIX) 40 MG tablet, Take 1 tablet by mouth daily., Disp: 30 tablet, Rfl: 11  pregabalin (LYRICA) 75 MG capsule, Take 75 mg by mouth 2 (two) times daily. Take before and after dialysis, Disp: , Rfl:   protein (ENSURE HIGH PROTEIN) Powd, Take by mouth 3 (three) times daily. 3 teaspoons PO TID, Disp: , Rfl:   sevelamer carbonate (RENVELA) 800  mg Tab, Take 2 tablets (1,600 mg total) by mouth 3 (three) times daily with meals., Disp: 180 tablet, Rfl: 3  silver sulfADIAZINE 1% (SILVADENE) 1 % cream, Apply 1 application topically 2 (two) times daily., Disp: 1 Bottle, Rfl: 11  trazodone (DESYREL) 100 MG tablet, Take 1 tablet (100 mg total) by mouth every evening., Disp: 30 tablet, Rfl: 11         Review of patient's allergies indicates:  No Known Allergies    Social History:   reports that he quit smoking about 17 years ago. He has never used smokeless tobacco. He reports that he does not drink alcohol or use drugs.     Review of patient's family history indicates:    Hypertension                   Father                      PHYSICAL EXAMINATION  Temp:  [97.3 °F (36.3 °C)-98.8 °F (37.1 °C)] 97.3 °F (36.3 °C)  Pulse:  [72-85] 77  Resp:  [10-18] 11  SpO2:  [96 %-100 %] 96 %  BP: ()/(52-89) 114/66    General Condition:    Alert x 3    Head & Neck  Anemia: None  Jaundice: None  Neck vein: Not distended  Carotid Bruits: none  Lymph nodes: none palpable  Thyroid: normal    Chest: normal    Heart: normal    Rt. Breast: not examined  Lt. Breast: not examined  Axillary lymph nodes: none    Abdomen: Soft,  None tender with no palpable mass or organ  Hernia: none    Rectal: Defered    Extremities:  Clotted av fistula left upper arm.    Vascular: normal    Specific focus Examination    Imp: clotted av fistula , crf, htn , cad    Plan: declotting today

## 2017-11-29 NOTE — ED NOTES
Patient has verified the spelling of their name and  on armband  LOC: The patient is awake, alert, and aware of environment with an appropriate affect, the patient is oriented x 4 and speaking appropriately.   APPEARANCE: Patient resting comfortably and in no acute distress, patient is clean and well groomed, patient's clothing is properly fastened.   SKIN: The skin is warm and dry, color consistent with ethnicity, patient has normal skin turgor and moist mucus membranes, skin intact, no breakdown or bruising noted, Right side BKA with drsg clean dry and intact.   : dialysis pt, pt states his still urinates  MUSCULOSKELETAL: right BKA  RESPIRATORY: Airway is open and patent, respirations are spontaneous, patient has a normal effort and rate, no accessory muscle use noted, bilateral breath sounds clear, denies SOB   ABDOMEN: Soft and non tender to palpation, no distention noted, normoactive bowel sounds present in all four quadrants.   CARDIAC: Normal rate and rhythm, no peripheral edema noted, less then 3 second capillary refill, denies chest pain  COMPLAINT: dialysis access to left arm clotted, pt states this is the first time this has happened

## 2017-11-29 NOTE — ED NOTES
Assumed care of this patient at this time. Pt lying on stretcher, awake, alert and oriented. Denies any issues at this time. No distress noted. On cardiac monitor. SR up and CB in reach.

## 2017-11-29 NOTE — ED PROVIDER NOTES
Encounter Date: 11/29/2017       History     Chief Complaint   Patient presents with    Vascular Access Problem     pt states he went to dialysis today and was told his graft was clotted.  Pt was told to come to ED for evaluation per Dr. Ackerman.  Pt did not receive dialysis Monday     67-year-old male with a past medical history of end-stage renal disease on hemodialysis who did not get dialyzed Monday presents to emergency department for evaluation of clotted dialysis fistula in the left upper extremity.  This is the first time and clotted.  He's been using it successfully in the past.  He has no fevers cough shortness of breath palpitations dyspnea or chest pain abdominal pain back pain.  He does have chronic pain in the right stump from his prior amputation.          Review of patient's allergies indicates:  No Known Allergies  Past Medical History:   Diagnosis Date    CHF (congestive heart failure)     Coronary artery disease     CVA (cerebral vascular accident) 2013    Diabetes mellitus     ESRD (end stage renal disease)     Hypertension     Stroke      Past Surgical History:   Procedure Laterality Date    CARDIAC SURGERY      PTCA WITH STENT PLACEMENT    THYROID SURGERY       Family History   Problem Relation Age of Onset    Hypertension Father      Social History   Substance Use Topics    Smoking status: Former Smoker     Quit date: 9/22/2000    Smokeless tobacco: Never Used    Alcohol use No     Review of Systems   Constitutional: Negative for fever.   Respiratory: Negative for cough and shortness of breath.    Cardiovascular: Negative for chest pain.   Gastrointestinal: Negative for abdominal pain.   Genitourinary: Negative for flank pain.   Musculoskeletal: Positive for arthralgias. Negative for myalgias.   Skin: Negative for rash and wound.   Neurological: Negative for weakness, numbness and headaches.   Psychiatric/Behavioral: Negative for agitation and confusion.       Physical Exam      Initial Vitals [11/29/17 1148]   BP Pulse Resp Temp SpO2   (!) 100/52 80 16 97.3 °F (36.3 °C) 97 %      MAP       68         Physical Exam    Nursing note and vitals reviewed.  Constitutional: He appears well-developed and well-nourished. No distress.   HENT:   Head: Normocephalic and atraumatic.   Mouth/Throat: Oropharynx is clear and moist.   Eyes: Conjunctivae and EOM are normal. Pupils are equal, round, and reactive to light.   Neck: Neck supple.   Cardiovascular: Normal rate, regular rhythm and normal heart sounds.   Pulmonary/Chest: Breath sounds normal. He has no wheezes. He has no rhonchi. He has no rales.   Abdominal: Soft. There is no tenderness. There is no rebound and no guarding.   Musculoskeletal:   Right BKA w/ sock in place.    Neurological: He is alert and oriented to person, place, and time. He has normal strength.   Skin: Skin is warm and dry. No rash noted.   Dialysis fistula in the left arm w/ pulse, but no thrill or bruit.          ED Course   Procedures  Labs Reviewed   CBC W/ AUTO DIFFERENTIAL   MAGNESIUM   PHOSPHORUS   COMPREHENSIVE METABOLIC PANEL             Medical Decision Making:   Patient's dialysis fistula is clotted.  I spoke with Dr. Ackerman who will operate likely this afternoon.  Pt will be admitted to medicine.                    ED Course      Clinical Impression:   The encounter diagnosis was Clotted dialysis access, initial encounter.                           Winston Ruiz MD  11/29/17 1875

## 2017-11-29 NOTE — ANESTHESIA PREPROCEDURE EVALUATION
11/29/2017  Williams Bland is a 67 y.o., male with multiple co-morbidities here for thrombectomy under local/MAC.  K 5.7 - not treated yet    PMH:  CAD w/ stent placement  HTN  CHF - EF 20-25%  -s/p mitral valve repair 2014  H/o tracheostomy and PEG placement 01/2015  CVA  DM2  ESRD on dialysis  Hypothyroidism    Past Surgical History:   Procedure Laterality Date    CARDIAC SURGERY      PTCA WITH STENT PLACEMENT    THYROID SURGERY     - cholecystectomy  - AV Fistula creation 08/2015  - Multiple toe and foot amputations, progressed to Right BKA (March 2016-04/2016)    Anes Hx:  Multiple procedures under MAC and general (see epic reports)    DL Inserted by: CRNA;Endotracheal Tube; Mask Ventilation: Easy; Intubated: Postinduction; Blade: Ludwig #2; Airway Device Size: 7.5; Style: Cuffed; Grade: Grade I; Complicating Factors: None    No Known Allergies      Pre-op Assessment    I have reviewed the Patient Summary Reports.     I have reviewed the Nursing Notes.   I have reviewed the Medications.     Review of Systems  Anesthesia Hx:  No problems with previous Anesthesia    Social:  Former Smoker, No Alcohol Use    Hematology/Oncology:     Oncology Normal    -- Anemia:   EENT/Dental:EENT/Dental Normal   Cardiovascular:   Exercise tolerance: poor Hypertension Valvular problems/Murmurs (S/p MV Replacement) CAD  CABG/stent (stent)  CHF    Pulmonary:  Pulmonary Normal    Renal/:   Chronic Renal Disease, ESRD, Dialysis    Hepatic/GI:  Hepatic/GI Normal    Neurological:   CVA    Endocrine:   Diabetes, type 2 Hypothyroidism    Psych:  Psychiatric Normal         Wt Readings from Last 3 Encounters:   11/29/17 68 kg (150 lb)   11/28/17 71.2 kg (157 lb)   11/27/17 71.2 kg (157 lb)     Temp Readings from Last 3 Encounters:   11/29/17 36.3 °C (97.3 °F) (Oral)   11/29/17 37.1 °C (98.8 °F)   11/28/17 35.7 °C (96.3 °F) (Oral)      BP Readings from Last 3 Encounters:   11/29/17 114/66   11/29/17 104/68   11/28/17 (!) 150/83     Pulse Readings from Last 3 Encounters:   11/29/17 77   11/29/17 72   11/28/17 94           Physical Exam  General:  Well nourished    Airway/Jaw/Neck:  Airway Findings: Mouth Opening: Normal Tongue: Normal  General Airway Assessment: Adult  Mallampati: I  TM Distance: Normal, at least 6 cm         Dental:  Dental Findings: upper partial dentures   Chest/Lungs:  Chest/Lungs Findings: Clear to auscultation     Heart/Vascular:  Heart Findings: Rate: Normal  Rhythm: Regular Rhythm        Mental Status:  Mental Status Findings:  Cooperative, Alert and Oriented       Lab Results   Component Value Date    WBC 8.97 11/29/2017    HGB 12.6 (L) 11/29/2017    HCT 36.6 (L) 11/29/2017    MCV 75 (L) 11/29/2017     11/29/2017       Chemistry        Component Value Date/Time     (L) 11/29/2017 1357    K 5.7 (H) 11/29/2017 1357    CL 89 (L) 11/29/2017 1357    CO2 16 (L) 11/29/2017 1357    BUN 74 (H) 11/29/2017 1357    CREATININE 7.6 (H) 11/29/2017 1357     (H) 11/29/2017 1357        Component Value Date/Time    CALCIUM 8.3 (L) 11/29/2017 1357    ALKPHOS 192 (H) 11/29/2017 1357    AST 28 11/29/2017 1357    ALT 21 11/29/2017 1357    BILITOT 0.7 11/29/2017 1357    ESTGFRAFRICA 8 (A) 11/29/2017 1357    EGFRNONAA 7 (A) 11/29/2017 1357            EKG 11/29/17  Unusual P axis, possible ectopic atrial rhythm  Right bundle branch block  Anteroseptal infarct ,age undetermined  T wave abnormality, consider inferior ischemia  Abnormal ECG    TTE 09/15/2017  CONCLUSIONS     1 - Wall motion abnormalities.     2 - Severely depressed left ventricular systolic function (EF 20-25%).     3 - Normal Mitral valve bioprosthesis.           Anesthesia Plan  Type of Anesthesia, risks & benefits discussed:  Anesthesia Type:  MAC  Patient's Preference:   Intra-op Monitoring Plan: standard ASA monitors  Intra-op Monitoring Plan Comments:    Post Op Pain Control Plan:   Post Op Pain Control Plan Comments:   Induction:    Beta Blocker:  Patient is on a Beta-Blocker and has received one dose within the past 24 hours (No further documentation required).       Informed Consent: Patient understands risks and agrees with Anesthesia plan.  Questions answered. Anesthesia consent signed with patient.  ASA Score: 3     Day of Surgery Review of History & Physical: I have interviewed and examined the patient. I have reviewed the patient's H&P dated:  There are no significant changes.          Ready For Surgery From Anesthesia Perspective.

## 2017-11-30 NOTE — PLAN OF CARE
Problem: Pressure Ulcer Risk (Osvaldo Scale) (Adult,Obstetrics,Pediatric)  Goal: Identify Related Risk Factors and Signs and Symptoms  Related risk factors and signs and symptoms are identified upon initiation of Human Response Clinical Practice Guideline (CPG)   Outcome: Ongoing (interventions implemented as appropriate)  Plan of care discussed and all questions answered. Goals created and achievement desired. Education given on diagnosis. Understanding verbalized. All safety measures maintained. Bed locked and in lowest position. Call light within reach. Surgery to declot dialysis graft done today and patient understands he will see Dr. schmidt in morning

## 2017-11-30 NOTE — CONSULTS
[facility] Internal Medicine Consult - Resident Note    Primary Attending Physician: Dr. Corwin Ackerman  Primary Team: Vascular Surgery  Consultant Attending: Dr. Rishi Steele  Consultant Resident: Anna    Reason for Consult:     Medical Managmenet    Subjective:      History of Present Illness:  Williams Bland is a 67 y.o.  male who  has a past medical history of CHF (congestive heart failure); Coronary artery disease; CVA (cerebral vascular accident) (2013); Diabetes mellitus; ESRD (end stage renal disease); Hypertension; and Stroke.. The patient presented to the [facility] on 11/29/2017 with a primary complaint of Vascular Access Malfunction x 1 days.    Patient was in his usual state of health (lives at assisted living, wheel chair bound) until 1 day day ago when he preseted to HD and was told that his R arm graft was clotted. He was told to present to the ED for evaluation by vascular surgery. Patient states he missed 2 days of HD total.Upon arrival to the ED, patient was taken to OR where thrombectomy of the graft was performed. Denies fever, chills, chest pain, abdominal pain, N/V, SOB, dizziness, dysuria, hematuria, syncope.    Past Medical History:  Past Medical History:   Diagnosis Date    CHF (congestive heart failure)     Coronary artery disease     CVA (cerebral vascular accident) 2013    Diabetes mellitus     ESRD (end stage renal disease)     Hypertension     Stroke        Past Surgical History:  Past Surgical History:   Procedure Laterality Date    CARDIAC SURGERY      PTCA WITH STENT PLACEMENT    THYROID SURGERY         Allergies:  Review of patient's allergies indicates:  No Known Allergies    Medications:   In-Hospital Scheduled Medications:   sodium chloride 0.9%   Intravenous Once    allopurinol  100 mg Oral QAM    amLODIPine  10 mg Oral Daily    atorvastatin  40 mg Oral Daily    carvedilol  3.125 mg Oral BID WM    furosemide  80 mg Oral Daily    insulin aspart   5 Units Subcutaneous TID WM    insulin detemir  10 Units Subcutaneous QHS    isosorbide mononitrate  60 mg Oral Daily    levothyroxine  125 mcg Oral Before breakfast    lisinopril  20 mg Oral Daily    lubiprostone  8 mcg Oral BID    pantoprazole  40 mg Oral Daily    sevelamer carbonate  1,600 mg Oral TID WM      In-Hospital PRN Medications:  sodium chloride 0.9%, acetaminophen, hydrocodone-acetaminophen 10-325mg, hydrocodone-acetaminophen 5-325mg   In-Hospital IV Infusion Medications:      Home Medications:  Prior to Admission medications    Medication Sig Start Date End Date Taking? Authorizing Provider   allopurinol (ZYLOPRIM) 100 MG tablet Take 100 mg by mouth every morning.     Historical Provider, MD   amlodipine (NORVASC) 10 MG tablet Take 1 tablet (10 mg total) by mouth once daily. 7/18/17   Amira Mills MD   aspirin (ECOTRIN) 81 MG EC tablet Take 1 tablet (81 mg total) by mouth once daily. 3/9/17 3/9/18  Manuel Greenwood MD   atorvastatin (LIPITOR) 40 MG tablet Take 1 tablet (40 mg total) by mouth once daily. 9/17/17   Erasmo Barboza MD   blood sugar diagnostic (CONTOUR TEST STRIPS) Strp USE TO CHECK BLOOD SUGAR THREE TIMES A DAY. 9/8/14   Historical Provider, MD   carvedilol (COREG) 3.125 MG tablet Take 1 tablet (3.125 mg total) by mouth 2 (two) times daily with meals. 9/17/17 9/17/18  Erasmo Barboza MD   clopidogrel (PLAVIX) 75 mg tablet Take 1 tablet (75 mg total) by mouth once daily. 7/18/17   Amira Mills MD   clopidogrel (PLAVIX) 75 mg tablet Take 1 tablet (75 mg total) by mouth once daily. 9/18/17 9/18/18  Erasmo Barboza MD   furosemide (LASIX) 80 MG tablet Take 1 tablet (80 mg total) by mouth once daily. 9/17/17   Erasmo Barboza MD   hydrocodone-acetaminophen 5-325mg (NORCO) 5-325 mg per tablet Take 1 tablet by mouth every 6 (six) hours as needed for Pain. 11/22/17   Nasim Torres MD   insulin aspart (NOVOLOG) 100 unit/mL InPn pen Inject 5 Units into the skin 3  (three) times daily with meals. 4/19/17 4/19/18  Edis Cyr MD   insulin detemir (LEVEMIR FLEXTOUCH) 100 unit/mL (3 mL) SubQ InPn pen Inject 10 Units into the skin every evening. 4/19/17 4/19/18  Edis Cyr MD   isosorbide mononitrate (IMDUR) 60 MG 24 hr tablet Take 1 tablet (60 mg total) by mouth once daily. 2/9/17   Nasim Torres MD   levothyroxine (SYNTHROID) 125 MCG tablet Take 125 mcg by mouth once daily.    Historical Provider, MD   levothyroxine (SYNTHROID) 125 MCG tablet Take 1 tablet (125 mcg total) by mouth before breakfast. 9/18/17 9/18/18  Erasmo Barboza MD   lisinopril (PRINIVIL,ZESTRIL) 20 MG tablet Take 1 tablet (20 mg total) by mouth once daily. 7/18/17   Amira Mills MD   lubiprostone (AMITIZA) 8 MCG Cap Take 8 mcg by mouth 2 (two) times daily.    Historical Provider, MD   miconazole (MICOTIN) 2 % cream Apply topically 2 (two) times daily. 1/7/15   NITESH Comer ANP   oxycodone-acetaminophen (PERCOCET)  mg per tablet Take 1 tablet by mouth every 6 (six) hours as needed for Pain.    Historical Provider, MD   pantoprazole (PROTONIX) 40 MG tablet Take 1 tablet by mouth daily. 12/14/16   NITESH Comer ANP   pregabalin (LYRICA) 75 MG capsule Take 75 mg by mouth 2 (two) times daily. Take before and after dialysis    Historical Provider, MD   protein (ENSURE HIGH PROTEIN) Powd Take by mouth 3 (three) times daily. 3 teaspoons PO TID    Historical Provider, MD   sevelamer carbonate (RENVELA) 800 mg Tab Take 2 tablets (1,600 mg total) by mouth 3 (three) times daily with meals. 9/17/17 9/17/18  Erasmo Barboza MD   silver sulfADIAZINE 1% (SILVADENE) 1 % cream Apply 1 application topically 2 (two) times daily. 7/16/15   Nasim Torres MD   trazodone (DESYREL) 100 MG tablet Take 1 tablet (100 mg total) by mouth every evening. 10/27/16   Nasim Torres MD       Family History:  Family History   Problem Relation Age of Onset    Hypertension Father  "       Social History:  Social History   Substance Use Topics    Smoking status: Former Smoker     Quit date: 2000    Smokeless tobacco: Never Used    Alcohol use No       Review of Systems:  Pertinent items are noted in HPI. All other systems are reviewed and are negative.       Objective:   Last 24 Hour Vital Signs:  BP  Min: 90/45  Max: 120/65  Temp  Av.2 °F (36.2 °C)  Min: 96.5 °F (35.8 °C)  Max: 97.7 °F (36.5 °C)  Pulse  Av.4  Min: 72  Max: 80  Resp  Av  Min: 10  Max: 19  SpO2  Av.1 %  Min: 95 %  Max: 99 %  Height  Av' 11" (180.3 cm)  Min: 5' 11" (180.3 cm)  Max: 5' 11" (180.3 cm)  Weight  Av.8 kg (162 lb 10.1 oz)  Min: 68 kg (150 lb)  Max: 79.5 kg (175 lb 4.3 oz)  I/O last 3 completed shifts:  In: 25 [I.V.:25]  Out: -     Physical Examination:  BP (!) 98/58 (Patient Position: Lying)   Pulse 72   Temp 97.7 °F (36.5 °C) (Oral)   Resp 16   Ht 5' 11" (1.803 m)   Wt 79.5 kg (175 lb 4.3 oz)   SpO2 95%   BMI 24.44 kg/m²       General: Alert and awake in no apparent distress  Head:  Normocephalic and atraumatic  Eyes:  PERRL; EOMi with anicteric sclera and clear conjunctivae  Mouth:  Oropharynx clear and without exudate; moist mucous membranes  Cardio:  Regular rate and rhythm with normal S1 and S2; no murmurs or rubs  Resp:  CTAB; respirations unlabored; no wheezes, crackles or rhonchi  Abdom: Soft, NTND with normoactive bowel sounds  Extrem: Warm and well-perfused with no clubbing, R BKA. R arm fistula bandage intact  Skin:  No rashes, lesions, or color changes  Pulses: 2+ and symmetric distally  Neuro:  AAOx3; cooperative and pleasant with no focal deficits          Laboratory Results:  Most Recent Data:  CBC: Lab Results   Component Value Date    WBC 8.97 2017    HGB 12.6 (L) 2017    HCT 36.6 (L) 2017     2017    MCV 75 (L) 2017    RDW 17.3 (H) 2017     BMP: Lab Results   Component Value Date     (L) 2017    K 5.7 " (H) 11/29/2017    CL 89 (L) 11/29/2017    CO2 16 (L) 11/29/2017    BUN 74 (H) 11/29/2017     (H) 11/29/2017    CALCIUM 8.3 (L) 11/29/2017    MG 1.7 11/29/2017    PHOS 5.7 (H) 11/29/2017     LFTs: Lab Results   Component Value Date    PROT 6.8 11/29/2017    ALBUMIN 3.2 (L) 11/29/2017    BILITOT 0.7 11/29/2017    AST 28 11/29/2017    ALKPHOS 192 (H) 11/29/2017    ALT 21 11/29/2017     Coags:   Lab Results   Component Value Date    INR 1.1 03/07/2017     FLP: Lab Results   Component Value Date    CHOL 79 (L) 03/07/2017    HDL 41 03/07/2017    LDLCALC 28.6 (L) 03/07/2017    TRIG 47 03/07/2017    CHOLHDL 51.9 (H) 03/07/2017     DM: Lab Results   Component Value Date    HGBA1C >14.0 (H) 07/18/2017    HGBA1C 14.7 (H) 03/08/2017    HGBA1C 14.5 (H) 03/07/2017    LDLCALC 28.6 (L) 03/07/2017    CREATININE 7.6 (H) 11/29/2017     Thyroid: Lab Results   Component Value Date    TSH 1.686 09/14/2017    FREET4 0.86 03/08/2017    J9INGZY 3.3 (L) 12/02/2014    U4QAHDS 112 07/25/2006     Anemia: Lab Results   Component Value Date    IRON 46 04/19/2017    TIBC 186 (L) 04/19/2017    FERRITIN 1,866 (H) 04/19/2017    HLUYVRBE54 780 07/18/2017    FOLATE 6.8 07/18/2017     Cardiac: Lab Results   Component Value Date    TROPONINI 0.521 (H) 11/28/2017    CKTOTAL 618 (H) 11/26/2014    CKMB 2.1 11/26/2014    BNP >4,900 (H) 09/14/2017     Urinalysis: Lab Results   Component Value Date    LABURIN No growth 11/28/2014    COLORU Yellow 09/14/2017    SPECGRAV >=1.030 (A) 09/14/2017    NITRITE Negative 09/14/2017    KETONESU 1+ (A) 09/14/2017    UROBILINOGEN Negative 09/14/2017       Trended Lab Data:    Recent Labs  Lab 11/27/17  1235 11/27/17  1709 11/28/17 2037 11/29/17  1357   WBC 6.84  --  10.33 8.97   HGB 11.3*  --  11.3* 12.6*   HCT 35.2*  --  34.0* 36.6*   *  --  118* 162   MCV 78*  --  76* 75*   RDW 17.2*  --  17.1* 17.3*   * 123* 123* 124*   K 5.2* 4.3 4.7 5.7*   CL 85* 89* 90* 89*   CO2 18* 19* 18* 16*   BUN 61* 61* 71*  74*   * 570* 362* 292*   PROT 5.7*  --  5.4* 6.8   ALBUMIN 2.8*  --  2.7* 3.2*   BILITOT 0.6  --  0.7 0.7   AST 14  --  33 28   ALKPHOS 183*  --  182* 192*   ALT 16  --  23 21       Trended Cardiac Data:    Recent Labs  Lab 11/27/17  1235 11/28/17 2037   TROPONINI 0.439* 0.521*       Other Results:  EKG (my interpretation): RBBB, Q-waves anterior leads. Stable from previous. No ST-changes    Radiology:  X-ray Chest 1 View    Result Date: 11/27/2017  AP chest Comparison: 9/14/17 Result: The cardiac silhouette is enlarged.  There are sternotomy wires.  The pulmonary vascularity is normal.  Subsegmental airspace consolidation noted at the right lung base with possible associated right pleural effusion, it is not significantly changed from the prior study.  No pneumothorax.  The osseous structures appear normal.    Right pleural effusion and subsegmental atelectasis or airspace disease at the right lung base similar to prior several studies. Electronically signed by: GABRIELLA MENDOZA MD Date:     11/27/17 Time:    13:34     Us Lower Extremity Arteries Left    Result Date: 11/28/2017  Left LOWER EXTREMITY DUPLEX  ARTERIAL ULTRASOUND EXAMINATION. Technique: Left lower extremity arterial duplex ultrasound examination performed. Multiple gray scale and color doppler images were obtained in addition to waveform analysis.  Comparison: None. History:  Peripheral vascular disease Findings: The peak systolic velocities on the left are as follows, in centimeters/second: Common femoral artery: 95 triphasic Superficial femoral artery, proximal: 79 biphasic Superficial femoral artery, distal: 41 biphasic Popliteal artery: 67 biphasic Posterior tibial artery: 0 Dorsalis pedis artery: 41 monophasic Anterior tibial artery: 49 monophasic Normal arterial waveforms are demonstrated.    . Dense atherosclerotic disease seen throughout. No flow is demonstrated within the left posterior tibial artery with minimal flow of the left  peroneal artery. Patient with right below-the-knee amputation Electronically signed by: MIKAL PANDA MD Date:     11/28/17 Time:    12:58     Us Lower Extremity Venous Insufficiency Left    Result Date: 11/28/2017  EXAM: US LOWER EXTREMITY VENOUS INSUFFICIENCY LEFT CLINICAL HISTORY: Left lower extremity edema FINDINGS: The deep veins of  the left lower extremity from common femoral through popliteal vein were examined. The veins demonstrate normal flow, compressibility, venous waveforms, and response to augmentation. Pulsatile venous flow throughout much of the left lower extremity.  Diffuse soft tissue edema.  Small Baker's cyst noted in the popliteal fossa measuring 3.0 x 0.7 x 1.7 cm. There was no reflux elicited on Valsalva imaging.  Erect imaging unable to be performed secondary to patient's physical condition.    No evidence of DVT or reflux. Slightly pulsatile venous flow as could be seen with congestive heart failure. Electronically signed by: CHELSEA PALENCIA MD Date:     11/28/17 Time:    13:26          Assessment:     Williams Bland is a 67 y.o. male with:  Patient Active Problem List    Diagnosis Date Noted    Clotted dialysis access 11/29/2017    Below knee amputation status, right 11/22/2017    Hypothyroidism due to acquired atrophy of thyroid 09/17/2017    Swelling 09/14/2017    Physical deconditioning 07/18/2017    Weakness 07/17/2017    Hyperglycemia 04/19/2017    Fluid overload 04/19/2017    Thrombocytopenia 04/19/2017    Hypertensive emergency 04/19/2017    Acute on chronic systolic heart failure 04/19/2017    Pulmonary hypertension 04/19/2017    NSTEMI (non-ST elevated myocardial infarction) 03/07/2017    Personal history of noncompliance with medical treatment, presenting hazards to health 03/07/2017    Serum potassium elevated 03/11/2016    ESRD on hemodialysis     Essential hypertension     Coronary artery disease involving coronary bypass graft of native heart without angina  pectoris 08/29/2015    Volume overload 08/29/2015    Mitral valve replaced 03/26/2015    Anemia 12/08/2014    Hyperlipidemia LDL goal <70 09/18/2014    Hypothyroid 09/18/2014    Uncontrolled type 2 diabetes mellitus with chronic kidney disease on chronic dialysis, with long-term current use of insulin 06/02/2014    Elevated troponin 05/30/2014    Mitral regurgitation 05/08/2014    Coronary artery disease 05/08/2014    Cerebrovascular disease 05/08/2014        Plan:     Volume Overload 2/2 CKD5 and HFrEF  - On admission, patient missed HD 2/2 clotted fistula  - Continue on home Coreg, Lisinopril, Imdur, Lasix  - Recommend HD as per Nephrology     HTN  - On admission, /52  - Hold home amlodipine this AM due to hyptension    DM2 with Peripheral Neuropathy  - F/u A1C  - Continue on home Levemir 10U qhs & SSI w/ POCT glucose  - Will add/titrate as needed    Hyperkalemia  - On admission, K 5.7. EKG with no changes from baseline  - Recommend HD as per Nephrology    Hypervolemic Hyperchloremic Hyponatremia  - Likely secondary to CKD5  - Recommend HD as per Nephrology    CAD s/p CABG and Stents  - Stable no acute issues  - Continue on home Aspirin 81mg daily, Plavix 75mg daily, Atorvastatin 40mg daily    CVA 2013   - Stable no acute issues  - Continue on home Aspirin 81mg daily, Plavix 75mg daily, Atorvastatin 40mg daily    Thank you for allowing us to participate in the care of this patient. Please contact me at  if you have any questions regarding this consult.    Suresh Castillo  [facility] Internal Medicine [id number]  [facility] Internal Medicine Service    [facility] Medicine Hospitalist Pager numbers:   [facility] Hospitalist Medicine Team A (Krista/Patrizia): [phone]  [facility] Hospitalist Medicine Team B (Ivette/Kathi):  [phone]

## 2017-11-30 NOTE — CONSULTS
Nephrology Consult  H&P      Consult Requested By: Rishi Steele MD  Reason for Consult: LUBNA    SUBJECTIVE:     History of Present Illness:  Patient is a 67 y.o. male  Send from HD unit for clotted AVF    Well known to me since 10/28 was admitted to [facility] originally for hemoptysis - alveolar hemorrhage and LUBNA - was concern for ANCA vasculitis, was on high dose steroids - kidney bx, bronch and all serologic work up negative for ANCA. Kidney bx shoved stable chronic HTN and diabetic glomerulopathy. 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 Currently Hd in [facility]??? Pt is not following with me and I last seen him right before he started HD in Jan 2016  HE does not know who is his current nephrologist    Review of Systems   Constitutional: Negative for chills, diaphoresis, fever and weight loss.   Eyes: Negative for blurred vision and double vision.   Respiratory: Negative for cough and shortness of breath.    Cardiovascular: Positive for leg swelling. Negative for chest pain and orthopnea.   Gastrointestinal: Negative for abdominal pain, heartburn, nausea and vomiting.   Genitourinary: Negative for dysuria, frequency and urgency.   Musculoskeletal: Negative for joint pain and myalgias.   Skin: Negative for itching and rash.   Neurological: Negative for dizziness, tingling, tremors, weakness and headaches.   Endo/Heme/Allergies: Negative for environmental allergies. Does not bruise/bleed easily.   Psychiatric/Behavioral: Negative for depression.       Past Medical History:   Diagnosis Date    CHF (congestive heart failure)     Coronary artery disease     CVA (cerebral vascular accident) 2013     Diabetes mellitus     ESRD (end stage renal disease)     Hypertension     Stroke      Past Surgical History:   Procedure Laterality Date    CARDIAC SURGERY      PTCA WITH STENT PLACEMENT    THYROID SURGERY       Family History   Problem Relation Age of Onset    Hypertension Father      Social History   Substance Use Topics    Smoking status: Former Smoker     Quit date: 9/22/2000    Smokeless tobacco: Never Used    Alcohol use No       Review of patient's allergies indicates:  No Known Allergies         OBJECTIVE:     Vital Signs (Most Recent)  Vitals:    11/30/17 0425 11/30/17 0500 11/30/17 0748 11/30/17 0800   BP: (!) 90/45 (!) 98/58 (!) 70/32 (!) 90/53   BP Location:   Right arm    Patient Position: Lying Lying Lying Lying   Pulse: 72  72    Resp: 16  18    Temp: 97.7 °F (36.5 °C)  97 °F (36.1 °C)    TempSrc: Oral  Oral    SpO2:       Weight: 79.5 kg (175 lb 4.3 oz)      Height:                      Medications:   sodium chloride 0.9%   Intravenous Once    allopurinol  100 mg Oral QAM    atorvastatin  40 mg Oral Daily    carvedilol  3.125 mg Oral BID WM    furosemide  80 mg Oral Daily    insulin aspart  5 Units Subcutaneous TID WM    insulin detemir  10 Units Subcutaneous QHS    levothyroxine  125 mcg Oral Before breakfast    lubiprostone  8 mcg Oral BID    pantoprazole  40 mg Oral Daily    sevelamer carbonate  1,600 mg Oral TID WM           Physical Exam   Constitutional: He is oriented to person, place, and time and well-developed, well-nourished, and in no distress. No distress.   HENT:   Head: Atraumatic.   Mouth/Throat: Oropharynx is clear and moist.   Eyes: EOM are normal. No scleral icterus.   Neck: Neck supple. No JVD present.   Cardiovascular: Normal rate, regular rhythm and intact distal pulses.  Exam reveals no friction rub.    Murmur heard.  Pulmonary/Chest: Effort normal. No respiratory distress. He has no wheezes. He has no rales.   Abdominal: Soft. Bowel sounds are normal. He  exhibits no distension. There is no tenderness.   Musculoskeletal: He exhibits edema (B BKA and 4 + pitting edema of both thighs).   Neurological: He is alert and oriented to person, place, and time.   Skin: Skin is warm and dry. No rash noted. No erythema.     Laboratory:    Recent Labs  Lab 11/27/17  1235 11/28/17 2037 11/29/17  1357   WBC 6.84 10.33 8.97   HGB 11.3* 11.3* 12.6*   HCT 35.2* 34.0* 36.6*   * 118* 162   MONO 9.2  0.6 5.3  0.6 7.9  0.7       Recent Labs  Lab 11/27/17  1709 11/28/17 2037 11/29/17  1357   * 123* 124*   K 4.3 4.7 5.7*   CL 89* 90* 89*   CO2 19* 18* 16*   BUN 61* 71* 74*   CREATININE 6.5* 7.2* 7.6*   CALCIUM 8.1* 7.7* 8.3*   PHOS  --   --  5.7*       Diagnostic Results:  X-Ray: Reviewed    Echo: 3/8/17 Reviewed  1 - Moderately depressed left ventricular systolic function (EF 35-40%).     2 - Eccentric hypertrophy.     3 - Mildly depressed right ventricular systolic function .     4 - Pulmonary hypertension. The estimated PA systolic pressure is 88 mmHg.   ASSESSMENT/PLAN:     1. ESRD - HD MWF. Sherlyn boateng???  Plan for Hd today  2.  HTN - hypotensive, stop Coreg, not sure if BP measurement accurate    3. Anemia - at goal, no need for epo today      Recent Labs  Lab 11/27/17  1235 11/28/17 2037 11/29/17  1357   WBC 6.84 10.33 8.97   HGB 11.3* 11.3* 12.6*   HCT 35.2* 34.0* 36.6*   * 118* 162     Lab Results   Component Value Date    IRON 46 04/19/2017    TIBC 186 (L) 04/19/2017    FERRITIN 1,866 (H) 04/19/2017     4. MBD - Renvela 1600 TID  Lab Results   Component Value Date    .0 (H) 08/31/2015    CALCIUM 8.3 (L) 11/29/2017    PHOS 5.7 (H) 11/29/2017       Recent Labs  Lab 11/29/17  1357   MG 1.7     5. Hyperuricemia - on allopurinol  6. Nutrition -   Lab Results   Component Value Date    LABPROT 12.0 03/07/2017    ALBUMIN 3.2 (L) 11/29/2017     Nepro with meals TID. Renal vitamins daily   7. Access - AMARI LYNN, SP joey    Thank you for consult, will  follow  With any question please call [phone]  Ebony Alvarez    [facility]  RAVI Cadet MD, SKYLER SERRANO MD,   MD ASPEN [place], NP  [address]  [place], [address]  [phone]

## 2017-11-30 NOTE — PROCEDURES
"Pt seen and examined on HD, BP low, but very variable with cuff position. Pt is AAOx3, NAD  Albumin PRN hypotension   BP (!) 90/53 (Patient Position: Lying)   Pulse 72   Temp 97 °F (36.1 °C) (Oral)   Resp 18   Ht 5' 11" (1.803 m)   Wt 79.5 kg (175 lb 4.3 oz)   SpO2 95%   BMI 24.44 kg/m²     For more details please see consult note from earlier today      ADDENDUM: 11/30 - got call from Hd nurse that venous needle clotted - examined AVF, both needles not aspirating, pulled out both needles--> no bleeding, no thrill.  Discussed with Dr. Ackerman --> likely culprit is hypotension--> stopped coreg already, will start midodrine, US of access, possible declot later today or tomorrow  "

## 2017-11-30 NOTE — PLAN OF CARE
Problem: Patient Care Overview  Goal: Plan of Care Review  Outcome: Ongoing (interventions implemented as appropriate)  No recovery room issues,vss, pain resolved, reported out to delvin rn, dressing has small amt. Drainage unchanged, small swelling unchanged from when arrived

## 2017-11-30 NOTE — OP NOTE
Operative Note       Surgery Date: 11/29/2017     Surgeon(s) and Role:     * Corwin Ackerman MD - Primary    Pre-op Diagnosis:  Non-rheumatic mitral regurgitation [I34.0]  Clotted dialysis access, initial encounter [T82.49XA]  Coronary artery disease involving native coronary artery of native heart without angina pectoris [I25.10]    Post-op Diagnosis: Post-Op Diagnosis Codes:     * Non-rheumatic mitral regurgitation [I34.0]     * Clotted dialysis access, initial encounter [T82.49XA]     * Coronary artery disease involving native coronary artery of native heart without angina pectoris [I25.10]    Procedure(s) (LRB):  Thrombectomy (Left)  Arm av fistula   Anesthesia: Local MAC    Procedure in Detail/Findings:  dictated  Estimated Blood Loss: 50 cc         Specimens     Blood clots not submitted        Implants: * No implants in log *           Disposition: PACU - hemodynamically stable.           Condition: Good    Attestation:  I performed the procedure.           Discharge Note    Admit Date: 11/29/2017    Attending Physician: No att. providers found     Discharge Physician: No att. providers found    Final Diagnosis: Post-Op Diagnosis Codes:     * Non-rheumatic mitral regurgitation [I34.0]     * Clotted dialysis access, initial encounter [T82.49XA]     * Coronary artery disease involving native coronary artery of native heart without angina pectoris [I25.10]    Disposition: Still a Patient    Patient Instructions:   Current Discharge Medication List      CONTINUE these medications which have NOT CHANGED    Details   allopurinol (ZYLOPRIM) 100 MG tablet Take 100 mg by mouth every morning.       amlodipine (NORVASC) 10 MG tablet Take 1 tablet (10 mg total) by mouth once daily.  Qty: 30 tablet, Refills: 5      aspirin (ECOTRIN) 81 MG EC tablet Take 1 tablet (81 mg total) by mouth once daily.  Refills: 0      atorvastatin (LIPITOR) 40 MG tablet Take 1 tablet (40 mg total) by mouth once daily.  Qty: 30 tablet,  Refills: 3      blood sugar diagnostic (CONTOUR TEST STRIPS) Strp USE TO CHECK BLOOD SUGAR THREE TIMES A DAY.      carvedilol (COREG) 3.125 MG tablet Take 1 tablet (3.125 mg total) by mouth 2 (two) times daily with meals.  Qty: 60 tablet, Refills: 3      !! clopidogrel (PLAVIX) 75 mg tablet Take 1 tablet (75 mg total) by mouth once daily.  Qty: 30 tablet, Refills: 5      !! clopidogrel (PLAVIX) 75 mg tablet Take 1 tablet (75 mg total) by mouth once daily.  Qty: 30 tablet, Refills: 3      furosemide (LASIX) 80 MG tablet Take 1 tablet (80 mg total) by mouth once daily.  Qty: 90 tablet, Refills: 3    Comments: Hold diuretics and if weight gain up to 4 lbs then restart (per nephrology).  Weigh yourself daily.      hydrocodone-acetaminophen 5-325mg (NORCO) 5-325 mg per tablet Take 1 tablet by mouth every 6 (six) hours as needed for Pain.  Qty: 20 tablet, Refills: 0      insulin aspart (NOVOLOG) 100 unit/mL InPn pen Inject 5 Units into the skin 3 (three) times daily with meals.  Qty: 4.5 mL, Refills: 11      insulin detemir (LEVEMIR FLEXTOUCH) 100 unit/mL (3 mL) SubQ InPn pen Inject 10 Units into the skin every evening.  Qty: 3 mL, Refills: 11      isosorbide mononitrate (IMDUR) 60 MG 24 hr tablet Take 1 tablet (60 mg total) by mouth once daily.  Qty: 30 tablet, Refills: 3      !! levothyroxine (SYNTHROID) 125 MCG tablet Take 125 mcg by mouth once daily.      !! levothyroxine (SYNTHROID) 125 MCG tablet Take 1 tablet (125 mcg total) by mouth before breakfast.  Qty: 30 tablet, Refills: 3      lisinopril (PRINIVIL,ZESTRIL) 20 MG tablet Take 1 tablet (20 mg total) by mouth once daily.  Qty: 30 tablet, Refills: 5      lubiprostone (AMITIZA) 8 MCG Cap Take 8 mcg by mouth 2 (two) times daily.      miconazole (MICOTIN) 2 % cream Apply topically 2 (two) times daily.  Qty: 92 g, Refills: 0      oxycodone-acetaminophen (PERCOCET)  mg per tablet Take 1 tablet by mouth every 6 (six) hours as needed for Pain.      pantoprazole  (PROTONIX) 40 MG tablet Take 1 tablet by mouth daily.  Qty: 30 tablet, Refills: 11      pregabalin (LYRICA) 75 MG capsule Take 75 mg by mouth 2 (two) times daily. Take before and after dialysis      protein (ENSURE HIGH PROTEIN) Powd Take by mouth 3 (three) times daily. 3 teaspoons PO TID      sevelamer carbonate (RENVELA) 800 mg Tab Take 2 tablets (1,600 mg total) by mouth 3 (three) times daily with meals.  Qty: 180 tablet, Refills: 3      silver sulfADIAZINE 1% (SILVADENE) 1 % cream Apply 1 application topically 2 (two) times daily.  Qty: 1 Bottle, Refills: 11      trazodone (DESYREL) 100 MG tablet Take 1 tablet (100 mg total) by mouth every evening.  Qty: 30 tablet, Refills: 11    Associated Diagnoses: Insomnia       !! - Potential duplicate medications found. Please discuss with provider.      STOP taking these medications       collagenase ointment Comments:   Reason for Stopping:               Patient tolertated procedure well and was sent to recovery room in stable condition.

## 2017-11-30 NOTE — OP NOTE
DATE OF PROCEDURE:  11/29/2017    PREOPERATIVE DIAGNOSIS:  Clotted AV fistula, left upper arm.    POSTOPERATIVE DIAGNOSIS:  Clotted AV fistula, left upper arm.    OPERATION PERFORMED:  Declotting thrombectomy AV fistula.    SURGEON:  Corwin Ackerman M.D.    ANESTHESIA:  Xylocaine 1% with IV sedation.    PROCEDURE:  After satisfactory IV sedation, the patient was positioned, left   upper arm and forearm was prepped and draped in normal sterile manner using   ChloraPrep.  The area near the outflow of the fistula was infiltrated using 1%   Xylocaine solution.  Incision was made in the same area and taken down to deep   subcutaneous tissue, subcuticular and bovied, further taken down.  Short segment   of vein was isolated.  Proximal and distal control was obtained.  Incision was   made in the vein.  Doretha introduced first on the venous side.  Good backward   flow was obtained.  Doretha could not be passed through the arterial side.  The   patient was heparinized using 3000 units of heparin.  Incision in the vein was   closed using running 6-0 Prolene suture.  Then attention diverted to the inflow   area where area was infiltrated using 1% Xylocaine solution.  Incision was made   in the same area, taken down deep subcutaneous tissue, subcuticular and bovied,   further taken down.  Short segment of the fistula was isolated.  Proximal and   distal control was obtained.  An incision was made in the fistula.  Thrombus and   fistula and the blood clots were removed from the fistula.  The patient was   heparinized using 3000 units of heparin.  Incision was then closed using running   5-0 Prolene suture.  The patient had good bruit after completion of procedure.    Hemostasis satisfactorily maintained.  Both wounds were then closed using   interrupted 3-0 Vicryl for subcutaneous tissue.  The skin was closed with   running 4-0 nylon suture.  Sterile gauze dressing was applied.  The instrument   count, sponge count, and  needle count was correct.  The patient tolerated it   well.  Estimated blood loss was 50 mL.  Specimen removed was blood clot and were   not submitted.      MS/IN  dd: 11/29/2017 20:21:15 (CST)  td: 11/30/2017 02:08:51 (CST)  Doc ID   #[id number]  Job ID #[id number]    CC:

## 2017-11-30 NOTE — ANESTHESIA POSTPROCEDURE EVALUATION
"Anesthesia Post Evaluation    Patient: Williams Bland    Procedure(s) Performed: Procedure(s) (LRB):  Thrombectomy (Left)    Final Anesthesia Type: MAC  Patient location during evaluation: PACU  Patient participation: Yes- Able to Participate  Level of consciousness: awake  Post-procedure vital signs: reviewed and stable  Pain management: adequate  Airway patency: patent  PONV status at discharge: No PONV  Anesthetic complications: no      Cardiovascular status: stable  Respiratory status: room air  Hydration status: euvolemic  Follow-up not needed.        Visit Vitals  BP (!) 92/50   Pulse 72   Temp 35.8 °C (96.5 °F)   Resp 18   Ht 5' 11" (1.803 m)   Wt 68 kg (150 lb)   SpO2 95%   BMI 20.92 kg/m²       Pain/Prema Score: Pain Assessment Performed: Yes (11/29/2017  9:00 PM)  Presence of Pain: denies (11/29/2017  9:00 PM)  Pain Rating Prior to Med Admin: 7 (11/29/2017  8:36 PM)  Prema Score: 10 (11/29/2017  9:00 PM)  Modified Prema Score: 20 (11/29/2017  9:00 PM)      "

## 2017-11-30 NOTE — ANESTHESIA POSTPROCEDURE EVALUATION
"Anesthesia Post Evaluation    Patient: Williams Bland    Procedure(s) Performed: Procedure(s) (LRB):  Thrombectomy (Left)    Final Anesthesia Type: MAC  Patient location during evaluation: OPS  Patient participation: Yes- Able to Participate  Level of consciousness: awake and alert  Post-procedure vital signs: reviewed and stable  Pain management: adequate  Airway patency: patent  PONV status at discharge: No PONV  Anesthetic complications: no      Cardiovascular status: blood pressure returned to baseline and hemodynamically stable  Respiratory status: unassisted, spontaneous ventilation and room air  Hydration status: euvolemic  Follow-up not needed.        Visit Vitals  /66 (BP Location: Right arm, Patient Position: Lying)   Pulse 77   Temp 36.3 °C (97.3 °F) (Oral)   Resp 11   Ht 5' 11" (1.803 m)   Wt 68 kg (150 lb)   SpO2 96%   BMI 20.92 kg/m²       Pain/Prema Score: Pain Rating Prior to Med Admin: 6 (11/29/2017  3:20 PM)      "

## 2017-11-30 NOTE — PLAN OF CARE
Problem: Hemodialysis (Adult)  Goal: Signs and Symptoms of Listed Potential Problems Will be Absent, Minimized or Managed (Hemodialysis)  Signs and symptoms of listed potential problems will be absent, minimized or managed by discharge/transition of care (reference Hemodialysis (Adult) CPG).   Outcome: Ongoing (interventions implemented as appropriate)   11/30/17 1113   Hemodialysis   Problems Assessed (Hemodialysis) electrolyte imbalance;fluid imbalance   Problems Present (Hemodialysis) electrolyte imbalance;fluid imbalance   HD 4hrs with [facility]

## 2017-11-30 NOTE — TRANSFER OF CARE
"Anesthesia Transfer of Care Note    Patient: Williams Bland    Procedure(s) Performed: Procedure(s) (LRB):  Thrombectomy (Left)    Patient location: OPS    Anesthesia Type: MAC    Transport from OR: Transported from OR on room air with adequate spontaneous ventilation    Post pain: adequate analgesia    Post assessment: no apparent anesthetic complications and tolerated procedure well    Post vital signs: stable    Level of consciousness: awake, alert and oriented    Nausea/Vomiting: no nausea/vomiting    Complications: none    Transfer of care protocol was followed      Last vitals:   Visit Vitals  /66 (BP Location: Right arm, Patient Position: Lying)   Pulse 77   Temp 36.3 °C (97.3 °F) (Oral)   Resp 11   Ht 5' 11" (1.803 m)   Wt 68 kg (150 lb)   SpO2 96%   BMI 20.92 kg/m²     "

## 2017-12-01 NOTE — PROGRESS NOTES
[facility] Internal Medicine Resident HO-1 Progress Note    Subjective:      Williams Bland is a 67 y.o.  male who is being followed by the [facility] service at [facility] for problems with his dialysis access fistula clotting.     He is feeling ok this morning but feel frustrated at his current problems with his access. His graft was declotted and then re-clotted yesterday with HD attempt. Vascular surgery planning to declot again and place Parish catheter today.   He had no problems overnight.      Objective:   Last 24 Hour Vital Signs:  BP  Min: 70/32  Max: 102/64  Temp  Av.3 °F (36.3 °C)  Min: 96.4 °F (35.8 °C)  Max: 97.9 °F (36.6 °C)  Pulse  Av.2  Min: 68  Max: 86  Resp  Av  Min: 18  Max: 18  I/O last 3 completed shifts:  In: 455 [P.O.:430; I.V.:25]  Out: -     Physical Examination:  Gen: Thin man, resting comfortably, NAD  HEENT: EOMI, no scleral icterus,  Clear conjunctiva  Cv: RRR, no murmurs or rubs auscultated. Normal S1 and S2  Lungs: CTAB, respirations even and non labored. No rales, crackles, rhonchi, or wheezing  Gi: thin, soft, non tender to palpation. Normoactive bowel sounds  Extrem: warm, with edema of LE. Left upper arm sutures clean and without erythema. Graft with palpable pulse but no real thrill.   Skin: dry, warm, no rashes or breakdown  Neuro: AAOx3, cooperative. No focal deficits.     Laboratory:  Laboratory Data Reviewed: yes  Pertinent Findings:  WBC 7.08  H/H 9.9/29.4  Plt 147    Na 124 (123 yesterday)  K 5.1  BUN 79  Cr 8.2  Gluc 143      Current Medications:     Infusions:        Scheduled:   sodium chloride 0.9%   Intravenous Once    sodium chloride 0.9%   Intravenous Once    allopurinol  100 mg Oral QAM    atorvastatin  40 mg Oral Daily    furosemide  80 mg Oral Daily    insulin aspart  5 Units Subcutaneous TID WM    insulin detemir  10 Units Subcutaneous QHS    levothyroxine  125 mcg Oral Before breakfast    lubiprostone   8 mcg Oral BID    pantoprazole  40 mg Oral Daily    sevelamer carbonate  1,600 mg Oral TID WM        PRN:  sodium chloride 0.9%, acetaminophen, ceFAZolin (ANCEF) IVPB, dextrose 50%, dextrose 50%, glucagon (human recombinant), glucose, glucose, hydrocodone-acetaminophen 10-325mg, hydrocodone-acetaminophen 5-325mg, insulin aspart, mupirocin    Antibiotics and Day Number of Therapy:  Ancef during surgery only    Lines and Day Number of Therapy:  Left upper arm fistula  PIV right arm, 1 day    Assessment:     Williams Bland is a 67 y.o.male with  Patient Active Problem List    Diagnosis Date Noted    Clotted dialysis access 11/29/2017    Below knee amputation status, right 11/22/2017    Hypothyroidism due to acquired atrophy of thyroid 09/17/2017    Swelling 09/14/2017    Physical deconditioning 07/18/2017    Weakness 07/17/2017    Hyperglycemia 04/19/2017    Fluid overload 04/19/2017    Thrombocytopenia 04/19/2017    Hypertensive emergency 04/19/2017    Acute on chronic systolic heart failure 04/19/2017    Pulmonary hypertension 04/19/2017    NSTEMI (non-ST elevated myocardial infarction) 03/07/2017    Personal history of noncompliance with medical treatment, presenting hazards to health 03/07/2017    Serum potassium elevated 03/11/2016    ESRD on hemodialysis     Essential hypertension     Coronary artery disease involving coronary bypass graft of native heart without angina pectoris 08/29/2015    Volume overload 08/29/2015    Mitral valve replaced 03/26/2015    Anemia 12/08/2014    Hyperlipidemia LDL goal <70 09/18/2014    Hypothyroid 09/18/2014    Uncontrolled type 2 diabetes mellitus with chronic kidney disease on chronic dialysis, with long-term current use of insulin 06/02/2014    Elevated troponin 05/30/2014    Mitral regurgitation 05/08/2014    Coronary artery disease 05/08/2014    Cerebrovascular disease 05/08/2014        Plan:     AV Graft Clot  - Sent from dialysis for  malfunctioning dialysis graft and suspected clot  - 11/30  HD Access: Thrombus fills the majority of the venous outflow of LUE  - Repair as per Vascular Surgery (Dr. Ackerman) yesterday but then clotted off again during HD attempt  -Vascular Surg today to declot again and place Parish catheter today     Volume Overload 2/2 CKD5 and HFrEF  - On admission, patient missed HD 2/2 clotted fistula  - At home on Coreg, Lisinopril, Imdur, Lasix  - Recommend HD as per Nephrology  -Nephro believe his clotting may be due to hypotension during HD (got to 70/32). They have stopped coreg. Patient also not on imdur or lisinopril while currently inpatient.   -continue lasix and dialyze when access obtained      HTN  - On admission, /52  - Hold home amlodipine, coreg, imdur, lisinopril  -BP today without home meds 101/58. Continue holding     DM2 with Peripheral Neuropathy  - A1C >14  - Continue on home Levemir 10U qhs & SSI w/ POCT glucose. Added aspart 5 units with meals  - Will add/titrate as needed  -current blood glucose controlled while inpatient, 142 this am.  -will need tighter glucose control at home     Hyperkalemia  - On admission, K 5.7. EKG with no changes from baseline  - Recommend HD as per Nephrology  -K today at 5.1     Hypervolemic Hyperchloremic Hyponatremia  - Likely secondary to CKD5  - Recommend HD as per Nephrology  -Na today at 124, up from 123 yesterday  -continue to closely monitor for signs of mental status changes     CAD s/p CABG and Stents  - Stable no acute issues  - Continue on home Aspirin 81mg daily, Plavix 75mg daily, Atorvastatin 40mg daily  -ordered lipid panel, pending      CVA 2013   - Stable no acute issues  - Continue on home Aspirin 81mg daily, Plavix 75mg daily, Atorvastatin 40mg daily  -ordered lipid panel, pending    Ppx: SCDs  Diet: NPO  Dispo: pending resolution of graft clotting    Martha Acharya  [facility] Internal Medicine HO-1  [facility] Internal Medicine Service Team B    [facility]  Medicine Hospitalist Pager numbers:   [facility] Hospitalist Medicine Team A (Krista/Patrizia): [phone]  [facility] Hospitalist Medicine Team B (Ivette/Kathi):  [phone]

## 2017-12-01 NOTE — PLAN OF CARE
Problem: Renal Replacement, Continuous (Adult)  Goal: Signs and Symptoms of Listed Potential Problems Will be Absent, Minimized or Managed (Renal Replacement, Continuous)  Signs and symptoms of listed potential problems will be absent, minimized or managed by discharge/transition of care (reference Renal Replacement, Continuous (Adult) CPG).  Outcome: Outcome(s) achieved Date Met: 12/01/17 12/01/17 1508   Renal Replacement, Continuous   Problems Assessed (Continuous Renal Replacement Therapy) all   Problems Present (Continuous Renal Replacement Therapy) electrolyte imbalance;fluid imbalance   Continuous CVVHD with U[facility] today.

## 2017-12-01 NOTE — PROGRESS NOTES
Dr. Daugherty notified pt in ICU and pt bp low SBP 80s-70s.  MD verbalizes understanding. No new orders given at this time.   Care is ongoing will continue to monitor pt status.

## 2017-12-01 NOTE — PLAN OF CARE
[facility] Medicine Plan of Care Note    I went to see Mr. Bland after he was moved to the ICU for hypotensive episode following attempt at declotting in the OR and getting a permacath. He was given midodrine and is on CRRT with SBP in the 70-100s.     He is complaining of pain on his backside and a close examination reveal an indurated, tender nodule at the top of the gluteal cleft without any drainage, but skin noted to be flaking. I have spoken with Dr. Ackerman who will examine him to determine if it may need drainage or wound care.       Shonda Vincent  \[facility]\"" IM PGY3  [facility] Medicine Team B  [phone]

## 2017-12-01 NOTE — PLAN OF CARE
1432 Pt arrived from OR with CRNA Zachary and RNs, BP low SBP 60s-70s, CRNAs at bedside. Monitoring pt and admin medications per CRNAs.   1450 Dr. Steele team paged and notified pt in ICU from OR, notified of pt bp low  MDs to come assess pt.

## 2017-12-01 NOTE — PROGRESS NOTES
Dr. Vincent at bedside and aware of pt bp states to admin midodrine additional po dose now.      12/01/17 1615   Vital Signs   Pulse (!) 113   Resp 13   SpO2 100 %   BP (!) 75/55   MAP (mmHg) 61   Art Line   Arterial Line BP 59/46   Arterial Line MAP (mmHg) 51 mmHg

## 2017-12-01 NOTE — PLAN OF CARE
Nurse informed Dr. Richardson of the patient's current automatic b/p: 84/39, the use of a doppler to get the SBP:90, and the patient's current POCT glucose of 316. Awaiting orders

## 2017-12-01 NOTE — ANESTHESIA POSTPROCEDURE EVALUATION
"Anesthesia Post Evaluation    Patient: Williams Bland    Procedure(s) Performed: Procedure(s) (LRB):  Thrombectomy (Left)  INSERTION-CATHETER-PERM-A-CATH (Right)    Final Anesthesia Type: MAC  Patient location during evaluation: PACU  Patient participation: Yes- Able to Participate  Level of consciousness: awake and alert, oriented and awake  Post-procedure vital signs: reviewed and stable  Pain management: adequate  Airway patency: patent  PONV status at discharge: No PONV  Anesthetic complications: no      Cardiovascular status: blood pressure returned to baseline  Respiratory status: unassisted and room air  Hydration status: euvolemic  Follow-up not needed.        Visit Vitals  BP (!) 78/55   Pulse (!) 118   Temp 36.3 °C (97.3 °F) (Oral)   Resp 11   Ht 5' 11" (1.803 m)   Wt 79.5 kg (175 lb 4.3 oz)   SpO2 100%   BMI 24.44 kg/m²       Pain/Prema Score: Pain Assessment Performed: Yes (12/1/2017 11:20 AM)  Presence of Pain: denies (12/1/2017 11:20 AM)  Pain Rating Prior to Med Admin: 10 (11/30/2017  3:25 PM)      "

## 2017-12-01 NOTE — PROGRESS NOTES
"Surgery follow up  /64 (Patient Position: Lying)   Pulse 83   Temp 96.4 °F (35.8 °C) (Oral)   Resp 18   Ht 5' 11" (1.803 m)   Wt 79.5 kg (175 lb 4.3 oz)   SpO2 95%   BMI 24.44 kg/m²   I/O last 3 completed shifts:  In: 25 [I.V.:25]  Out: -   I/O this shift:  In: 430 [P.O.:430]  Out: -   Access clotted again , will declot  and also place Parish catheter in am , surgery not available till late.  "

## 2017-12-01 NOTE — PLAN OF CARE
Problem: Patient Care Overview  Goal: Plan of Care Review  Outcome: Ongoing (interventions implemented as appropriate)  Pt on RA with sats of 99%.  Will continue to monitor.

## 2017-12-01 NOTE — PROGRESS NOTES
Dr. ADI Acharya with Dr. Steele team at bedside and aware of pt bp states to admin midodrine po dose that was not given this am, pt bp usually low 90s. During dialysis bp will go to 70s-90s at times.no further orders given at this time care is ongoing will continue to monitor pt status.      12/01/17 1530   Vital Signs   Pulse 96   Resp 12   SpO2 96 %   BP (!) 84/59   MAP (mmHg) 67   Art Line   Arterial Line BP 78/41   Arterial Line MAP (mmHg) 52 mmHg

## 2017-12-01 NOTE — OP NOTE
DATE OF PROCEDURE:  12/01/2017.    PREOPERATIVE DIAGNOSIS:  Clotted AV fistula, left upper arm.    POSTOPERATIVE DIAGNOSIS:  Clotted AV fistula, left upper arm.    OPERATION:  Declotting AV fistula, left upper arm plus insertion of right   subclavian Parish catheter.    SURGEON:  Corwin Ackerman M.D.    ANESTHESIA:  Xylocaine 1% with IV sedation.    PROCEDURE IN DETAIL:  After satisfactory IV sedation, the patient in supine   position, left upper arm and forearm was prepped and draped in normal sterile   manner using ChloraPrep.  Stockinette was applied.  The area of the previous   scar, distal incision in the left upper arm area was infiltrated using 1%   Xylocaine solution.  Incision was made in the same area, taken down to deep   subcutaneous tissue, subcuticular and bovied, further taken down.  Proximal and   distal control was obtained over the proximal AV fistula.  At this point, the   patient's blood pressure was found to be low.  The patient was given   epinephrine, arterial  line was placed, procedure was continued in the process.    Doretha catheter was introduced on the venous side.  Good backward flow was   obtained, removing all the thrombus from the area.  Then, Doretha catheter was   introduced on the arterial side and excellent forward flow was obtained.  The   patient was heparinized using 3000 units of heparin.  Incision in the fistula   was then closed using running 5-0 Prolene suture.  Wound was then closed using   interrupted 3-0 Vicryl for subcutaneous tissue.  The skin was closed using   running 4-0 nylon suture.  Sterile gauze dressing was applied.  The instrument   count, sponge count, and needle count was correct for that part of the   procedure.  Attention was diverted to the right side neck area and chest, which   was prepped and draped in normal sterile manner using ChloraPrep.  Attempts were   made to introduce the catheter into the right IJ, was found to be very   posteriorly  located.  I decided to put a Parish catheter at this point to get   the patient on the table, subclavian area was infiltrated using 1% Xylocaine   solution.  An 18-gauge catheter needle introduced in the subclavian vein.    Guidewire was positioned in superior vena cava.  Tract was dilated with vein   dilator.  Pre-heparinized catheter was positioned in the superior vena cava, was   sutured in place using 2-0 silk suture.  Both limbs of the catheter were   irrigated 1000 unit heparin.  Final x-ray showed good positioning of the   catheter with fluoroscopy.  Sterile gauze dressing was applied.  The instrument   count, sponge count, needle count was correct.  Estimated blood loss was 30 mL.    Specimen removed was thrombus, which was not submitted.  The patient was    transferred to Intensive Care in stable, but critical condition.          /eliza [id number] blank(s)        MS/YOSI  dd: 12/01/2017 14:23:34 (CST)  td: 12/01/2017 22:09:36 (CST)  Doc ID   #[id number]  Job ID #[id number]    CC:     [id number]

## 2017-12-01 NOTE — PLAN OF CARE
Plan of care    Patient is currently stable with heart rate 127 and blood pressure 120s-130s/ 60s. He is mentating clearly and speaking to me. Alert and oriented x 3.     EKG just performed, looks similar to previous EKG from earlier today.     I attempted to call his wife and his daughter to let them know how he is doing. He is from [place] so they have not been able to come see him. Neither the wife or daughter answered. I will try again later.    Martha Acharya, DO  [facility]  12/1/17  6:00pm

## 2017-12-01 NOTE — TRANSFER OF CARE
"Anesthesia Transfer of Care Note    Patient: Williams Bland    Procedure(s) Performed: Procedure(s) (LRB):  Thrombectomy (Left)  INSERTION-CATHETER-PERM-A-CATH (Right)    Patient location: ICU    Anesthesia Type: MAC    Transport from OR: Transported from OR on room air with adequate spontaneous ventilation. Continuous ECG monitoring in transport. Continuous SpO2 monitoring in transport. Continuos invasive BP monitoring in transport    Post pain: adequate analgesia    Post assessment: no apparent anesthetic complications and tolerated procedure well    Post vital signs: stable    Level of consciousness: awake, alert and oriented    Nausea/Vomiting: no nausea/vomiting    Complications: none    Transfer of care protocol was followed      Last vitals:   Visit Vitals  /63 (BP Location: Right arm, Patient Position: Lying)   Pulse 81   Temp 36.3 °C (97.3 °F) (Oral)   Resp 14   Ht 5' 11" (1.803 m)   Wt 79.5 kg (175 lb 4.3 oz)   SpO2 95%   BMI 24.44 kg/m²     "

## 2017-12-01 NOTE — ANESTHESIA PREPROCEDURE EVALUATION
12/01/2017  Williams Bland is a 67 y.o., male for Left thrombectomy    Review of patient's allergies indicates:  No Known Allergies    Past Medical History:   Diagnosis Date    CHF (congestive heart failure)     Coronary artery disease     CVA (cerebral vascular accident) 2013    Diabetes mellitus     ESRD (end stage renal disease)     Hypertension     Stroke      Past Surgical History:   Procedure Laterality Date    CARDIAC SURGERY      PTCA WITH STENT PLACEMENT    THYROID SURGERY       Patient Active Problem List   Diagnosis    Mitral regurgitation    Coronary artery disease    Cerebrovascular disease    Elevated troponin    Uncontrolled type 2 diabetes mellitus with chronic kidney disease on chronic dialysis, with long-term current use of insulin    Hyperlipidemia LDL goal <70    Hypothyroid    Anemia    Mitral valve replaced    Coronary artery disease involving coronary bypass graft of native heart without angina pectoris    Volume overload    Serum potassium elevated    ESRD on hemodialysis    Essential hypertension    NSTEMI (non-ST elevated myocardial infarction)    Personal history of noncompliance with medical treatment, presenting hazards to health    Hyperglycemia    Fluid overload    Thrombocytopenia    Hypertensive emergency    Acute on chronic systolic heart failure    Pulmonary hypertension    Weakness    Physical deconditioning    Swelling    Hypothyroidism due to acquired atrophy of thyroid    Below knee amputation status, right    Clotted dialysis access     Wt Readings from Last 3 Encounters:   11/30/17 79.5 kg (175 lb 4.3 oz)   11/28/17 71.2 kg (157 lb)   11/27/17 71.2 kg (157 lb)     Temp Readings from Last 3 Encounters:   12/01/17 36.7 °C (98 °F) (Oral)   11/29/17 37.1 °C (98.8 °F)   11/28/17 35.7 °C (96.3 °F) (Oral)     BP Readings from Last 3  Encounters:   12/01/17 (!) 111/56   11/29/17 104/68   11/28/17 (!) 150/83     Pulse Readings from Last 3 Encounters:   12/01/17 72   11/29/17 72   11/28/17 94         Anesthesia Evaluation    I have reviewed the Patient Summary Reports.        Review of Systems    Lab Results   Component Value Date    WBC 7.08 12/01/2017    HGB 9.9 (L) 12/01/2017    HCT 29.4 (L) 12/01/2017    MCV 75 (L) 12/01/2017     (L) 12/01/2017       Chemistry        Component Value Date/Time     (L) 12/01/2017 0409    K 5.1 12/01/2017 0409    CL 92 (L) 12/01/2017 0409    CO2 17 (L) 12/01/2017 0409    BUN 79 (H) 12/01/2017 0409    CREATININE 8.2 (H) 12/01/2017 0409     (H) 12/01/2017 0409        Component Value Date/Time    CALCIUM 7.0 (L) 12/01/2017 0409    ALKPHOS 134 12/01/2017 0409    AST 13 12/01/2017 0409    ALT 6 (L) 12/01/2017 0409    BILITOT 0.6 12/01/2017 0409    ESTGFRAFRICA 7 (A) 12/01/2017 0409    EGFRNONAA 6 (A) 12/01/2017 0409            Physical Exam  General:  Well nourished    Airway/Jaw/Neck:  Airway Findings: Mouth Opening: Normal Tongue: Normal  General Airway Assessment: Adult  Mallampati: II  Improves to II with phonation.  TM Distance: Normal, at least 6 cm       Chest/Lungs:  Chest/Lungs Findings: Clear to auscultation, Normal Respiratory Rate     Heart/Vascular:  Heart Findings: Rate: Normal  Rhythm: Regular Rhythm  Sounds: Normal        Mental Status:  Mental Status Findings:  Cooperative, Alert and Oriented     2D Echo:    Anesthesia Plan  Type of Anesthesia, risks & benefits discussed:  Anesthesia Type:  MAC, general, regional  Patient's Preference:   Intra-op Monitoring Plan:   Intra-op Monitoring Plan Comments:   Post Op Pain Control Plan:   Post Op Pain Control Plan Comments:   Induction:   IV  Beta Blocker:         Informed Consent: Patient understands risks and agrees with Anesthesia plan.  Questions answered. Anesthesia consent signed with patient.  ASA Score: 4     Day of Surgery Review of  History & Physical: I have interviewed and examined the patient. I have reviewed the patient's H&P dated:  There are no significant changes.  H&P update referred to the provider.         Ready For Surgery From Anesthesia Perspective.     CONCLUSIONS     1 - Wall motion abnormalities.     2 - Severely depressed left ventricular systolic function (EF 20-25%).     3 - Normal Mitral valve bioprosthesis.

## 2017-12-01 NOTE — PLAN OF CARE
Pt is being transferred to ICU straight from Surgery. Pt's belongings were took to ICU room 259. Spoke with nurse resuming care of pt. She received report from Surgery.

## 2017-12-01 NOTE — OP NOTE
Operative Note       Surgery Date: 12/1/2017     Surgeon(s) and Role:     * Corwin Ackerman MD - Primary    Pre-op Diagnosis:  Clotted dialysis access [T82.49XA]  ESRD on hemodialysis [N18.6, Z99.2]  Non-rheumatic mitral regurgitation [I34.0]  Clotted dialysis access, initial encounter [T82.49XA]  Coronary artery disease involving native coronary artery of native heart without angina pectoris [I25.10]    Post-op Diagnosis: Post-Op Diagnosis Codes:     * Clotted dialysis access [T82.49XA]     * ESRD on hemodialysis [N18.6, Z99.2]     * Non-rheumatic mitral regurgitation [I34.0]     * Clotted dialysis access, initial encounter [T82.49XA]     * Coronary artery disease involving native coronary artery of native heart without angina pectoris [I25.10]      Anesthesia: Local MAC  Operation: declotting thrombectomy left arm av fistula   And insertion subclavian catheter for dialysis  Procedure in Detail/Findings:  dictated    Estimated Blood Loss: 50 cc         Specimens     None        Implants:   Implant Name Type Inv. Item Serial No.  Lot No. LRB No. Used   KIT CATH C/V MLTI-LMN [id number] - [id number]   KIT CATH C/V MLTI-LMN [id number]   [employer] [id number]   1              Disposition: PACU - hemodynamically stable.           Condition: Serious    Attestation:  I performed the procedure.           Discharge Note    Admit Date: 11/29/2017    Attending Physician: Rishi Steele MD     Discharge Physician: Rishi Steele MD    Final Diagnosis: Post-Op Diagnosis Codes:     * Clotted dialysis access [T82.49XA]     * ESRD on hemodialysis [N18.6, Z99.2]     * Non-rheumatic mitral regurgitation [I34.0]     * Clotted dialysis access, initial encounter [T82.49XA]     * Coronary artery disease involving native coronary artery of native heart without angina pectoris [I25.10]    Disposition: Still a Patient    Patient Instructions:   Current Discharge Medication List      CONTINUE these medications which have NOT  CHANGED    Details   allopurinol (ZYLOPRIM) 100 MG tablet Take 100 mg by mouth every morning.       amlodipine (NORVASC) 10 MG tablet Take 1 tablet (10 mg total) by mouth once daily.  Qty: 30 tablet, Refills: 5      aspirin (ECOTRIN) 81 MG EC tablet Take 1 tablet (81 mg total) by mouth once daily.  Refills: 0      atorvastatin (LIPITOR) 40 MG tablet Take 1 tablet (40 mg total) by mouth once daily.  Qty: 30 tablet, Refills: 3      blood sugar diagnostic (CONTOUR TEST STRIPS) Strp USE TO CHECK BLOOD SUGAR THREE TIMES A DAY.      carvedilol (COREG) 3.125 MG tablet Take 1 tablet (3.125 mg total) by mouth 2 (two) times daily with meals.  Qty: 60 tablet, Refills: 3      !! clopidogrel (PLAVIX) 75 mg tablet Take 1 tablet (75 mg total) by mouth once daily.  Qty: 30 tablet, Refills: 5      !! clopidogrel (PLAVIX) 75 mg tablet Take 1 tablet (75 mg total) by mouth once daily.  Qty: 30 tablet, Refills: 3      furosemide (LASIX) 80 MG tablet Take 1 tablet (80 mg total) by mouth once daily.  Qty: 90 tablet, Refills: 3    Comments: Hold diuretics and if weight gain up to 4 lbs then restart (per nephrology).  Weigh yourself daily.      hydrocodone-acetaminophen 5-325mg (NORCO) 5-325 mg per tablet Take 1 tablet by mouth every 6 (six) hours as needed for Pain.  Qty: 20 tablet, Refills: 0      insulin aspart (NOVOLOG) 100 unit/mL InPn pen Inject 5 Units into the skin 3 (three) times daily with meals.  Qty: 4.5 mL, Refills: 11      insulin detemir (LEVEMIR FLEXTOUCH) 100 unit/mL (3 mL) SubQ InPn pen Inject 10 Units into the skin every evening.  Qty: 3 mL, Refills: 11      isosorbide mononitrate (IMDUR) 60 MG 24 hr tablet Take 1 tablet (60 mg total) by mouth once daily.  Qty: 30 tablet, Refills: 3      !! levothyroxine (SYNTHROID) 125 MCG tablet Take 125 mcg by mouth once daily.      !! levothyroxine (SYNTHROID) 125 MCG tablet Take 1 tablet (125 mcg total) by mouth before breakfast.  Qty: 30 tablet, Refills: 3      lisinopril  (PRINIVIL,ZESTRIL) 20 MG tablet Take 1 tablet (20 mg total) by mouth once daily.  Qty: 30 tablet, Refills: 5      lubiprostone (AMITIZA) 8 MCG Cap Take 8 mcg by mouth 2 (two) times daily.      miconazole (MICOTIN) 2 % cream Apply topically 2 (two) times daily.  Qty: 92 g, Refills: 0      oxycodone-acetaminophen (PERCOCET)  mg per tablet Take 1 tablet by mouth every 6 (six) hours as needed for Pain.      pantoprazole (PROTONIX) 40 MG tablet Take 1 tablet by mouth daily.  Qty: 30 tablet, Refills: 11      pregabalin (LYRICA) 75 MG capsule Take 75 mg by mouth 2 (two) times daily. Take before and after dialysis      protein (ENSURE HIGH PROTEIN) Powd Take by mouth 3 (three) times daily. 3 teaspoons PO TID      sevelamer carbonate (RENVELA) 800 mg Tab Take 2 tablets (1,600 mg total) by mouth 3 (three) times daily with meals.  Qty: 180 tablet, Refills: 3      silver sulfADIAZINE 1% (SILVADENE) 1 % cream Apply 1 application topically 2 (two) times daily.  Qty: 1 Bottle, Refills: 11      trazodone (DESYREL) 100 MG tablet Take 1 tablet (100 mg total) by mouth every evening.  Qty: 30 tablet, Refills: 11    Associated Diagnoses: Insomnia       !! - Potential duplicate medications found. Please discuss with provider.      STOP taking these medications       collagenase ointment Comments:   Reason for Stopping:               Patient tolerated preocedure well was sent to icu in stable but critical condition.

## 2017-12-01 NOTE — H&P
[facility] Internal Medicine History and Physical - Resident Note    Admitting Team: [facility] Internal Medicine Team B  Attending Physician: Dr. Rishi Steele  Resident: Anna  Interns: Vladimir    Date of Admit: 11/29/2017    Chief Complaint     Vascular Access Malfunction x 1 days.    Subjective:      History of Present Illness:  Williams Bland is a 67 y.o.  male who  has a past medical history of CHF (congestive heart failure); Coronary artery disease; CVA (cerebral vascular accident) (2013); Diabetes mellitus; ESRD (end stage renal disease); Hypertension; and Stroke.. The patient presented to the [facility] on 11/29/2017 with a primary complaint of Vascular Access Malfunction x 1 days.    Patient was in his usual state of health (lives at assisted living, wheel chair bound) until 1 day day ago when he preseted to HD and was told that his R arm graft was clotted. He was told to present to the ED for evaluation by vascular surgery. Patient states he missed 2 days of HD total.Upon arrival to the ED, patient was taken to OR where thrombectomy of the graft was performed. Denies fever, chills, chest pain, abdominal pain, N/V, SOB, dizziness, dysuria, hematuria, syncope.    Past Medical History:  Past Medical History:   Diagnosis Date    CHF (congestive heart failure)     Coronary artery disease     CVA (cerebral vascular accident) 2013    Diabetes mellitus     ESRD (end stage renal disease)     Hypertension     Stroke        Past Surgical History:  Past Surgical History:   Procedure Laterality Date    CARDIAC SURGERY      PTCA WITH STENT PLACEMENT    THYROID SURGERY         Allergies:  Review of patient's allergies indicates:  No Known Allergies    Home Medications:  Prior to Admission medications    Medication Sig Start Date End Date Taking? Authorizing Provider   allopurinol (ZYLOPRIM) 100 MG tablet Take 100 mg by mouth every morning.     Historical Provider, MD   amlodipine (NORVASC) 10 MG tablet  Take 1 tablet (10 mg total) by mouth once daily. 7/18/17   Amira Mills MD   aspirin (ECOTRIN) 81 MG EC tablet Take 1 tablet (81 mg total) by mouth once daily. 3/9/17 3/9/18  Manuel Greenwood MD   atorvastatin (LIPITOR) 40 MG tablet Take 1 tablet (40 mg total) by mouth once daily. 9/17/17   Erasmo Barboza MD   blood sugar diagnostic (CONTOUR TEST STRIPS) Strp USE TO CHECK BLOOD SUGAR THREE TIMES A DAY. 9/8/14   Historical Provider, MD   carvedilol (COREG) 3.125 MG tablet Take 1 tablet (3.125 mg total) by mouth 2 (two) times daily with meals. 9/17/17 9/17/18  Erasmo Barboza MD   clopidogrel (PLAVIX) 75 mg tablet Take 1 tablet (75 mg total) by mouth once daily. 7/18/17   Amira Mills MD   clopidogrel (PLAVIX) 75 mg tablet Take 1 tablet (75 mg total) by mouth once daily. 9/18/17 9/18/18  Erasmo Barboza MD   furosemide (LASIX) 80 MG tablet Take 1 tablet (80 mg total) by mouth once daily. 9/17/17   Erasmo Barboza MD   hydrocodone-acetaminophen 5-325mg (NORCO) 5-325 mg per tablet Take 1 tablet by mouth every 6 (six) hours as needed for Pain. 11/22/17   Nasim Torres MD   insulin aspart (NOVOLOG) 100 unit/mL InPn pen Inject 5 Units into the skin 3 (three) times daily with meals. 4/19/17 4/19/18  Edis Cyr MD   insulin detemir (LEVEMIR FLEXTOUCH) 100 unit/mL (3 mL) SubQ InPn pen Inject 10 Units into the skin every evening. 4/19/17 4/19/18  Edis Cyr MD   isosorbide mononitrate (IMDUR) 60 MG 24 hr tablet Take 1 tablet (60 mg total) by mouth once daily. 2/9/17   Nasim Torres MD   levothyroxine (SYNTHROID) 125 MCG tablet Take 125 mcg by mouth once daily.    Historical Provider, MD   levothyroxine (SYNTHROID) 125 MCG tablet Take 1 tablet (125 mcg total) by mouth before breakfast. 9/18/17 9/18/18  Erasmo Barboza MD   lisinopril (PRINIVIL,ZESTRIL) 20 MG tablet Take 1 tablet (20 mg total) by mouth once daily. 7/18/17   Amira Mills MD   lubiprostone (AMITIZA) 8  MCG Cap Take 8 mcg by mouth 2 (two) times daily.    Historical Provider, MD   miconazole (MICOTIN) 2 % cream Apply topically 2 (two) times daily. 1/7/15   NITESH Comer ANP   oxycodone-acetaminophen (PERCOCET)  mg per tablet Take 1 tablet by mouth every 6 (six) hours as needed for Pain.    Historical Provider, MD   pantoprazole (PROTONIX) 40 MG tablet Take 1 tablet by mouth daily. 16   NITESH Comer ANP   pregabalin (LYRICA) 75 MG capsule Take 75 mg by mouth 2 (two) times daily. Take before and after dialysis    Historical Provider, MD   protein (ENSURE HIGH PROTEIN) Powd Take by mouth 3 (three) times daily. 3 teaspoons PO TID    Historical Provider, MD   sevelamer carbonate (RENVELA) 800 mg Tab Take 2 tablets (1,600 mg total) by mouth 3 (three) times daily with meals. 17  Erasmo Barboza MD   silver sulfADIAZINE 1% (SILVADENE) 1 % cream Apply 1 application topically 2 (two) times daily. 7/16/15   Nasim Torres MD   trazodone (DESYREL) 100 MG tablet Take 1 tablet (100 mg total) by mouth every evening. 10/27/16   Nasim Torres MD       Family History:  Family History   Problem Relation Age of Onset    Hypertension Father        Social History:  Social History   Substance Use Topics    Smoking status: Former Smoker     Quit date: 2000    Smokeless tobacco: Never Used    Alcohol use No       Review of Systems:  Pertinent positives and negatives listed in HPI. All other systems are reviewed and are negative.       Objective:   Last 24 Hour Vital Signs:  BP  Min: 70/32  Max: 102/64  Temp  Av.3 °F (36.3 °C)  Min: 96.4 °F (35.8 °C)  Max: 97.9 °F (36.6 °C)  Pulse  Av.3  Min: 72  Max: 86  Resp  Av.7  Min: 16  Max: 18  Weight  Av.5 kg (175 lb 4.3 oz)  Min: 79.5 kg (175 lb 4.3 oz)  Max: 79.5 kg (175 lb 4.3 oz)  Body mass index is 24.44 kg/m².  I/O last 3 completed shifts:  In: 455 [P.O.:430; I.V.:25]  Out: -     Physical Examination:  General: Alert  and awake in no apparent distress  Head:  Normocephalic and atraumatic  Eyes:  PERRL; EOMi with anicteric sclera and clear conjunctivae  Mouth:  Oropharynx clear and without exudate; moist mucous membranes  Cardio:  Regular rate and rhythm with normal S1 and S2; no murmurs or rubs  Resp:  CTAB; respirations unlabored; no wheezes, crackles or rhonchi  Abdom: Soft, NTND with normoactive bowel sounds  Extrem: Warm and well-perfused with no clubbing, R BKA. R arm fistula bandage intact  Skin:  No rashes, lesions, or color changes  Pulses: 2+ and symmetric distally  Neuro:  AAOx3; cooperative and pleasant with no focal deficits    Laboratory:  Most Recent Data:  CBC: Lab Results   Component Value Date    WBC 8.97 11/29/2017    HGB 12.6 (L) 11/29/2017    HCT 36.6 (L) 11/29/2017     11/29/2017    MCV 75 (L) 11/29/2017    RDW 17.3 (H) 11/29/2017     BMP: Lab Results   Component Value Date     (L) 11/29/2017    K 5.7 (H) 11/29/2017    CL 89 (L) 11/29/2017    CO2 16 (L) 11/29/2017    BUN 74 (H) 11/29/2017    CREATININE 7.6 (H) 11/29/2017     (H) 11/29/2017    CALCIUM 8.3 (L) 11/29/2017    MG 1.7 11/29/2017    PHOS 5.7 (H) 11/29/2017     LFTs: Lab Results   Component Value Date    PROT 6.8 11/29/2017    ALBUMIN 3.2 (L) 11/29/2017    BILITOT 0.7 11/29/2017    AST 28 11/29/2017    ALKPHOS 192 (H) 11/29/2017    ALT 21 11/29/2017     Coags:   Lab Results   Component Value Date    INR 1.1 03/07/2017     FLP: Lab Results   Component Value Date    CHOL 79 (L) 03/07/2017    HDL 41 03/07/2017    LDLCALC 28.6 (L) 03/07/2017    TRIG 47 03/07/2017    CHOLHDL 51.9 (H) 03/07/2017     DM: Lab Results   Component Value Date    HGBA1C >14.0 (H) 07/18/2017    HGBA1C 14.7 (H) 03/08/2017    HGBA1C 14.5 (H) 03/07/2017    LDLCALC 28.6 (L) 03/07/2017    CREATININE 7.6 (H) 11/29/2017     Thyroid: Lab Results   Component Value Date    TSH 1.686 09/14/2017    FREET4 0.86 03/08/2017    Y3RNHQC 3.3 (L) 12/02/2014    I5GWEZV 112  07/25/2006     Anemia: Lab Results   Component Value Date    IRON 46 04/19/2017    TIBC 186 (L) 04/19/2017    FERRITIN 1,866 (H) 04/19/2017    QENIFEKR85 780 07/18/2017    FOLATE 6.8 07/18/2017     Cardiac: Lab Results   Component Value Date    TROPONINI 0.521 (H) 11/28/2017    CKTOTAL 618 (H) 11/26/2014    CKMB 2.1 11/26/2014    BNP >4,900 (H) 09/14/2017     Urinalysis: Lab Results   Component Value Date    LABURIN No growth 11/28/2014    COLORU Yellow 09/14/2017    SPECGRAV >=1.030 (A) 09/14/2017    NITRITE Negative 09/14/2017    KETONESU 1+ (A) 09/14/2017    UROBILINOGEN Negative 09/14/2017    WBCUA 2 09/14/2017       Trended Cardiac Data:    Recent Labs  Lab 11/27/17  1235 11/28/17 2037   TROPONINI 0.439* 0.521*       Radiology:  Imaging Results          US Hemodialysis Access (Final result)  Result time 11/30/17 12:52:23   Procedure changed from US Vein Mapping     Final result by Maile Kramer MD (11/30/17 12:52:23)                 Impression:     Thrombus fills the majority of the venous outflow for the patient's left upper extremity arteriovenous fistula.      Electronically signed by: MAILE KRAMER MD  Date:     11/30/17  Time:    12:52              Narrative:    Ultrasound hemodialysis access    The patient denies a left upper extremity brachiocephalic fistula.  Thrombus is seen within the venous outflow at the level of the mid and superior arm.  The anastomosis it does show flow.  There is a 2 x 9 cm hematoma near the surgical site in the left arm.                                 Assessment:     Williams Bland is a 67 y.o. male with:  Patient Active Problem List    Diagnosis Date Noted    Clotted dialysis access 11/29/2017    Below knee amputation status, right 11/22/2017    Hypothyroidism due to acquired atrophy of thyroid 09/17/2017    Swelling 09/14/2017    Physical deconditioning 07/18/2017    Weakness 07/17/2017    Hyperglycemia 04/19/2017    Fluid overload 04/19/2017    Thrombocytopenia  04/19/2017    Hypertensive emergency 04/19/2017    Acute on chronic systolic heart failure 04/19/2017    Pulmonary hypertension 04/19/2017    NSTEMI (non-ST elevated myocardial infarction) 03/07/2017    Personal history of noncompliance with medical treatment, presenting hazards to health 03/07/2017    Serum potassium elevated 03/11/2016    ESRD on hemodialysis     Essential hypertension     Coronary artery disease involving coronary bypass graft of native heart without angina pectoris 08/29/2015    Volume overload 08/29/2015    Mitral valve replaced 03/26/2015    Anemia 12/08/2014    Hyperlipidemia LDL goal <70 09/18/2014    Hypothyroid 09/18/2014    Uncontrolled type 2 diabetes mellitus with chronic kidney disease on chronic dialysis, with long-term current use of insulin 06/02/2014    Elevated troponin 05/30/2014    Mitral regurgitation 05/08/2014    Coronary artery disease 05/08/2014    Cerebrovascular disease 05/08/2014        Plan:     AV Graft Clot  - Sent from dialysis for malfunctioning dialysis graft and suspected clot  - 11/30  HD Access: Thrombus fills the majority of the venous outflow of LUE  - Repair as per Vascular Surgery (Dr. Ackerman)    Volume Overload 2/2 CKD5 and HFrEF  - On admission, patient missed HD 2/2 clotted fistula  - Continue on home Coreg, Lisinopril, Imdur, Lasix  - Recommend HD as per Nephrology     HTN  - On admission, /52  - Hold home amlodipine this AM due to hyptension    DM2 with Peripheral Neuropathy  - F/u A1C  - Continue on home Levemir 10U qhs & SSI w/ POCT glucose  - Will add/titrate as needed    Hyperkalemia  - On admission, K 5.7. EKG with no changes from baseline  - Recommend HD as per Nephrology    Hypervolemic Hyperchloremic Hyponatremia  - Likely secondary to CKD5  - Recommend HD as per Nephrology    CAD s/p CABG and Stents  - Stable no acute issues  - Continue on home Aspirin 81mg daily, Plavix 75mg daily, Atorvastatin 40mg daily    CVA  2013   - Stable no acute issues  - Continue on home Aspirin 81mg daily, Plavix 75mg daily, Atorvastatin 40mg daily      Suresh Castillo  [facility] Internal Medicine HO-II  [facility] Internal Medicine Service    [facility] Medicine Hospitalist Pager numbers:   [facility] Hospitalist Medicine Team A (Krista/Patrizia): [phone]  [facility] Hospitalist Medicine Team B (Ivette/Kathi):  [phone]

## 2017-12-01 NOTE — PLAN OF CARE
Problem: Patient Care Overview  Goal: Plan of Care Review  Outcome: Ongoing (interventions implemented as appropriate)  Plan of care reviewed with patient. Patient verbalized complete understanding. Fall precautions maintained. Bed in lowest position, locked, call light within reach and bed alarm is on. Side rails up x's 2 with slip resistant socks on. Patient turned q2 to prevent pressure ulcers. Insulin Aspart administered for hyperglycemia.Nurse instructed patient to notify staff for any assistance and the patient verbalized complete understanding. Patient on telemetry throughout shift with no ectopy noted. Will continue to monitor.

## 2017-12-01 NOTE — PLAN OF CARE
Problem: Patient Care Overview  Goal: Plan of Care Review  Outcome: Ongoing (interventions implemented as appropriate)  Pt is on bed resting with no S/S of pain or distress. He is on fall precautions. Pt is on telemetry, no ectopy, NSR. Pt had dialysis today but was unable to complete because his fistula became clogged again. He will be NPO after midnight and will have SX tomorrow for his fistula. He is currently stable and will continue to monitor. Will give report to oncoming nurse.

## 2017-12-01 NOTE — PLAN OF CARE
"  Patient is DME dependent, difficult to rouse this AM, states that he lives with his spouse but takes local transportation service to Dialysis on MWF.  Patient is expecting further procedures to declot his access. Patient has RLE prosthesis, denies wounds, states he would like home health at DC, states his significant other can give him a ride home at dc if "she don't have her fits on her".  [place] will continue to follow for DC needs.  Follow-up With  Details  Why  Contact Luis HIGHTOWER M.D.  Go on 12/5/2017  @ 2:10pm  [address] [place] [id number]  [phone]             12/01/17 0844   Discharge Assessment   Assessment Type Discharge Planning Assessment   Confirmed/corrected address and phone number on facesheet? Yes   Assessment information obtained from? Patient   Expected Length of Stay (days) 1   Communicated expected length of stay with patient/caregiver yes   Prior to hospitilization cognitive status: Alert/Oriented   Prior to hospitalization functional status: Independent   Current cognitive status: Alert/Oriented   Current Functional Status: Independent   Facility Arrived From: home   Lives With significant other   Able to Return to Prior Arrangements yes   Is patient able to care for self after discharge? Unable to determine at this time (comments)   Who are your caregiver(s) and their phone number(s)? Robyn Bland Spouse [phone]    Patient's perception of discharge disposition home or selfcare   Readmission Within The Last 30 Days no previous admission in last 30 days   Patient currently being followed by outpatient case management? No   Patient currently receives any other outside agency services? No   Equipment Currently Used at Home shower chair;wheelchair;walker, rolling   Do you have any problems affording any of your prescribed medications? No   Is the patient taking medications as prescribed? (undetermined)   Does the patient have transportation home? Yes "   Dialysis Name and Scheduled days [facility]   Discharge Plan A Home with family;Home Health   Discharge Plan B Home with family   Patient/Family In Agreement With Plan yes

## 2017-12-02 NOTE — PROGRESS NOTES
"[facility] Internal Medicine Resident HO-4 Progress Note    Subjective:      After transfer to ICU, patient was started on Levophed and CRRT. Levophed weaned as tolerated overnight but right sided radial arterial line had to be removed this morning.  Patient is now complaining of numbness in his right hand and states it feels cold.  Also notes that he is hungry and had difficulty sleeping overnight.      Objective:   Last 24 Hour Vital Signs:  BP  Min: 55/30  Max: 135/84  Temp  Av.3 °F (36.3 °C)  Min: 97 °F (36.1 °C)  Max: 97.9 °F (36.6 °C)  Pulse  Av.8  Min: 80  Max: 129  Resp  Avg: 10.3  Min: 7  Max: 20  SpO2  Av.6 %  Min: 96 %  Max: 100 %  Height  Av' 11" (180.3 cm)  Min: 5' 11" (180.3 cm)  Max: 5' 11" (180.3 cm)  Weight  Av.7 kg (169 lb 1.5 oz)  Min: 76.7 kg (169 lb 1.5 oz)  Max: 76.7 kg (169 lb 1.5 oz)  I/O last 3 completed shifts:  In: 709 [I.V.:709]  Out: 1549 [Urine:350; Other:1199]    Physical Examination:  Gen: Thin man, resting comfortably with sheets over head, NAD  HEENT: EOMI, no scleral icterus,  Clear conjunctiva  Cv: RRR. Normal S1 and S2  Lungs: CTAB, respirations even and non labored. No rales, crackles, rhonchi, or wheezing  Gi: thin, soft, non tender to palpation. Normoactive bowel sounds  Extrem: warm, with edema of BLE. Right BKA.  Left upper arm sutures clean and without erythema.    Skin: dry, warm, no rashes or breakdown  Neuro: AAOx3, cooperative. No focal deficits.     Laboratory:  Laboratory Data Reviewed: yes  Pertinent Findings:  WBC 11.46  H/H 10.7/31.2  Plt 156    Na 129  K 4.8  BUN 54  Cr 7.6  Gluc 180    Current Medications:     Infusions:   norepinephrine bitartrate-D5W 0.09 mcg/kg/min (17 0800)        Scheduled:   sodium chloride 0.9%   Intravenous Once    sodium chloride 0.9%   Intravenous Once    atorvastatin  40 mg Oral Daily    insulin aspart  5 Units Subcutaneous TID WM    insulin detemir  10 Units Subcutaneous QHS    levothyroxine  125 mcg " Oral Before breakfast    lubiprostone  8 mcg Oral BID    pantoprazole  40 mg Oral Daily    vancomycin (VANCOCIN) IVPB  1,000 mg Intravenous Once        PRN:  sodium chloride 0.9%, sodium chloride 0.9%, acetaminophen, dextrose 50%, dextrose 50%, glucagon (human recombinant), glucose, glucose, hydrocodone-acetaminophen 10-325mg, hydrocodone-acetaminophen 5-325mg, insulin aspart, magnesium sulfate IVPB, sodium phosphate IVPB, sodium phosphate IVPB, sodium phosphate IVPB    Antibiotics and Day Number of Therapy:  None    Lines and Day Number of Therapy:  Left upper arm fistula    Assessment:     Williams Bland is a 67 y.o.male with  Patient Active Problem List    Diagnosis Date Noted    Clotted dialysis access 11/29/2017    Below knee amputation status, right 11/22/2017    Hypothyroidism due to acquired atrophy of thyroid 09/17/2017    Swelling 09/14/2017    Physical deconditioning 07/18/2017    Weakness 07/17/2017    Hyperglycemia 04/19/2017    Fluid overload 04/19/2017    Thrombocytopenia 04/19/2017    Hypertensive emergency 04/19/2017    Acute on chronic systolic heart failure 04/19/2017    Pulmonary hypertension 04/19/2017    NSTEMI (non-ST elevated myocardial infarction) 03/07/2017    Personal history of noncompliance with medical treatment, presenting hazards to health 03/07/2017    Serum potassium elevated 03/11/2016    ESRD on hemodialysis     Essential hypertension     Coronary artery disease involving coronary bypass graft of native heart without angina pectoris 08/29/2015    Volume overload 08/29/2015    Mitral valve replaced 03/26/2015    Anemia 12/08/2014    Hyperlipidemia LDL goal <70 09/18/2014    Hypothyroid 09/18/2014    Uncontrolled type 2 diabetes mellitus with chronic kidney disease on chronic dialysis, with long-term current use of insulin 06/02/2014    Elevated troponin 05/30/2014    Mitral regurgitation 05/08/2014    Coronary artery disease 05/08/2014     Cerebrovascular disease 05/08/2014        Plan:     AV Graft Clot  - Sent from dialysis for malfunctioning dialysis graft and suspected clot  - 11/30  HD Access: Thrombus fills the majority of the venous outflow of LUE  - Repair as per Vascular Surgery (Dr. Ackerman) 11/30 but then clotted off again during HD attempt  - Vascular Surg 12/1 to declot again and place Parish catheter  Became hypotensive during surgery requiring NS bolus and Lovenox > Transferred to ICU for closer monitoring and initiating of CRRT and Levophed for goal MAP >65  - Per Dr. Ackerman, will need revision of fistula after medically stable      Volume Overload 2/2 CKD5 and HFrEF  - On admission, patient missed HD 2/2 clotted fistula  - At home on Coreg, Lisinopril, Imdur, Lasix  - Recommend HD as per Nephrology, currently on CRRT  -Nephro believe his clotting may be due to hypotension during HD (got to 70/32). They have stopped coreg. Patient also not on imdur or lisinopril while currently inpatient.   - Currently holding lasix while on CRRT       HTN  - On admission, /52  - Holding home amlodipine, coreg, imdur, lisinopril  - Due to severe hypotension during surgery 12/1, required addition of Levophed.  Currently weaning as tolerated for goal MAP >65.    Elevated Procalcitonin  - Procalcitonin elevated at 0.72, lactate 1.7, no leukocytosis  - Due to concern for underlying infection, will order blood cultures and give Vancomycin 1g IV x1 with random level in AM  - Consider repeat of Procal in AM     DM2 with Peripheral Neuropathy  - A1C >14  - Continue on home Levemir 10U qhs & SSI w/ POCT glucose. Added aspart 5 units with meals  - Will add/titrate as needed  -current blood glucose controlled while inpatient, 180 this am.  -will need tighter glucose control at home     Hyperkalemia  - On admission, K 5.7. EKG with no changes from baseline  - Recommend HD as per Nephrology  - K today at 4.8     Hypervolemic Hyperchloremic  Hyponatremia  - Likely secondary to CKD5  - Recommend HD as per Nephrology  -Na today at 124, up from 123 yesterday  -continue to closely monitor for signs of mental status changes     CAD s/p CABG and Stents  - Stable no acute issues  - Continue on home Aspirin 81mg daily, Plavix 75mg daily, Atorvastatin 40mg daily  - Cholesterol 59, TG 46, HDL 31, LDL 18  - Concern for cardiogenic shock contributing to hypotensive episode in OR      CVA 2013   - Stable no acute issues  - Continue on home Aspirin 81mg daily, Plavix 75mg daily, Atorvastatin 40mg daily  -ordered lipid panel, pending    Ppx: SCDs  Diet: Renal, Cardiac, Diabetic  Dispo: Continue ICU care while requiring Levophed and CRRT    Verena Richardson  [facility] Internal Medicine HO-4  [facility] Internal Medicine Service Team B    [facility] Medicine Hospitalist Pager numbers:   [facility] Hospitalist Medicine Team A (Krista/Patrizia): [phone]  [facility] Hospitalist Medicine Team B (Ivette/Kathi):  [phone]

## 2017-12-02 NOTE — SUBJECTIVE & OBJECTIVE
Past Medical History:   Diagnosis Date    CHF (congestive heart failure)     Coronary artery disease     CVA (cerebral vascular accident) 2013    Diabetes mellitus     ESRD (end stage renal disease)     Hypertension     Stroke        Past Surgical History:   Procedure Laterality Date    CARDIAC SURGERY      PTCA WITH STENT PLACEMENT    THYROID SURGERY         Review of patient's allergies indicates:  No Known Allergies    No current facility-administered medications on file prior to encounter.      Current Outpatient Prescriptions on File Prior to Encounter   Medication Sig    allopurinol (ZYLOPRIM) 100 MG tablet Take 100 mg by mouth every morning.     amlodipine (NORVASC) 10 MG tablet Take 1 tablet (10 mg total) by mouth once daily.    aspirin (ECOTRIN) 81 MG EC tablet Take 1 tablet (81 mg total) by mouth once daily.    atorvastatin (LIPITOR) 40 MG tablet Take 1 tablet (40 mg total) by mouth once daily.    blood sugar diagnostic (CONTOUR TEST STRIPS) Strp USE TO CHECK BLOOD SUGAR THREE TIMES A DAY.    carvedilol (COREG) 3.125 MG tablet Take 1 tablet (3.125 mg total) by mouth 2 (two) times daily with meals.    clopidogrel (PLAVIX) 75 mg tablet Take 1 tablet (75 mg total) by mouth once daily.    clopidogrel (PLAVIX) 75 mg tablet Take 1 tablet (75 mg total) by mouth once daily.    furosemide (LASIX) 80 MG tablet Take 1 tablet (80 mg total) by mouth once daily.    hydrocodone-acetaminophen 5-325mg (NORCO) 5-325 mg per tablet Take 1 tablet by mouth every 6 (six) hours as needed for Pain.    insulin aspart (NOVOLOG) 100 unit/mL InPn pen Inject 5 Units into the skin 3 (three) times daily with meals.    insulin detemir (LEVEMIR FLEXTOUCH) 100 unit/mL (3 mL) SubQ InPn pen Inject 10 Units into the skin every evening.    isosorbide mononitrate (IMDUR) 60 MG 24 hr tablet Take 1 tablet (60 mg total) by mouth once daily.    levothyroxine (SYNTHROID) 125 MCG tablet Take 125 mcg by mouth once daily.     levothyroxine (SYNTHROID) 125 MCG tablet Take 1 tablet (125 mcg total) by mouth before breakfast.    lisinopril (PRINIVIL,ZESTRIL) 20 MG tablet Take 1 tablet (20 mg total) by mouth once daily.    lubiprostone (AMITIZA) 8 MCG Cap Take 8 mcg by mouth 2 (two) times daily.    miconazole (MICOTIN) 2 % cream Apply topically 2 (two) times daily.    oxycodone-acetaminophen (PERCOCET)  mg per tablet Take 1 tablet by mouth every 6 (six) hours as needed for Pain.    pantoprazole (PROTONIX) 40 MG tablet Take 1 tablet by mouth daily.    pregabalin (LYRICA) 75 MG capsule Take 75 mg by mouth 2 (two) times daily. Take before and after dialysis    protein (ENSURE HIGH PROTEIN) Powd Take by mouth 3 (three) times daily. 3 teaspoons PO TID    sevelamer carbonate (RENVELA) 800 mg Tab Take 2 tablets (1,600 mg total) by mouth 3 (three) times daily with meals.    silver sulfADIAZINE 1% (SILVADENE) 1 % cream Apply 1 application topically 2 (two) times daily.    trazodone (DESYREL) 100 MG tablet Take 1 tablet (100 mg total) by mouth every evening.     Family History     Problem Relation (Age of Onset)    Hypertension Father        Social History Main Topics    Smoking status: Former Smoker     Quit date: 9/22/2000    Smokeless tobacco: Never Used    Alcohol use No    Drug use: No    Sexual activity: Not Currently     Review of Systems   Constitution: Positive for weakness and weight loss. Negative for diaphoresis, night sweats and weight gain.   HENT: Negative for congestion.    Eyes: Negative for blurred vision, discharge and double vision.   Cardiovascular: Positive for dyspnea on exertion and leg swelling. Negative for chest pain, claudication, cyanosis, irregular heartbeat, near-syncope, orthopnea, palpitations, paroxysmal nocturnal dyspnea and syncope.   Respiratory: Positive for shortness of breath, sleep disturbances due to breathing and snoring. Negative for cough and wheezing.    Endocrine: Negative for cold  intolerance, heat intolerance and polyphagia.   Hematologic/Lymphatic: Negative for adenopathy and bleeding problem. Does not bruise/bleed easily.   Skin: Negative for dry skin and nail changes.   Musculoskeletal: Negative for arthritis, back pain, falls, joint pain, myalgias and neck pain.   Gastrointestinal: Positive for bloating. Negative for abdominal pain, change in bowel habit and constipation.   Genitourinary: Negative for bladder incontinence, dysuria, flank pain, genital sores and missed menses.   Neurological: Negative for aphonia, brief paralysis, difficulty with concentration and dizziness.   Psychiatric/Behavioral: Positive for depression. Negative for altered mental status and memory loss. The patient is nervous/anxious. The patient does not have insomnia.    Allergic/Immunologic: Negative for environmental allergies.     Objective:     Vital Signs (Most Recent):  Temp: 97.4 °F (36.3 °C) (12/02/17 1115)  Pulse: 101 (12/02/17 1200)  Resp: 14 (12/02/17 1200)  BP: 128/78 (12/02/17 1200)  SpO2: 100 % (12/02/17 1200) Vital Signs (24h Range):  Temp:  [97 °F (36.1 °C)-97.9 °F (36.6 °C)] 97.4 °F (36.3 °C)  Pulse:  [] 101  Resp:  [7-20] 14  SpO2:  [96 %-100 %] 100 %  BP: ()/(30-88) 128/78  Arterial Line BP: ()/(41-98) 116/56     Weight: 76.7 kg (169 lb 1.5 oz)  Body mass index is 23.58 kg/m².    SpO2: 100 %  O2 Device (Oxygen Therapy): room air      Intake/Output Summary (Last 24 hours) at 12/02/17 1437  Last data filed at 12/02/17 1418   Gross per 24 hour   Intake           760.44 ml   Output             2142 ml   Net         -1381.56 ml       Lines/Drains/Airways     Central Venous Catheter Line                 Hemodialysis Catheter 12/01/17 1342 right subclavian 1 day          Drain                 Hemodialysis AV Fistula Left upper arm -- days         Hemodialysis AV Fistula Left upper arm -- days                Physical Exam   Constitutional: He is oriented to person, place, and time. He  appears well-developed and well-nourished. He is not intubated.   HENT:   Head: Normocephalic and atraumatic.   Right Ear: External ear normal.   Left Ear: External ear normal.   Mouth/Throat: Oropharynx is clear and moist.   Eyes: Conjunctivae and EOM are normal. Pupils are equal, round, and reactive to light. Right eye exhibits no discharge. Left eye exhibits no discharge. No scleral icterus.   Neck: Normal range of motion. Neck supple. Normal carotid pulses, no hepatojugular reflux and no JVD present. Carotid bruit is not present. No tracheal deviation present. No thyromegaly present.   Cardiovascular: Normal rate, regular rhythm, S1 normal and S2 normal.   Occasional extrasystoles are present. PMI is not displaced.  Exam reveals no gallop, no S3, no distant heart sounds, no friction rub and no midsystolic click.    Murmur heard.   Systolic murmur is present with a grade of 2/6  at the lower left sternal border  Pulses:       Carotid pulses are 2+ on the right side, and 2+ on the left side.       Radial pulses are 2+ on the right side, and 2+ on the left side.        Femoral pulses are 2+ on the right side, and 2+ on the left side.       Popliteal pulses are 2+ on the right side, and 2+ on the left side.        Dorsalis pedis pulses are 2+ on the left side. Right dorsalis pedis pulse not accessible.        Posterior tibial pulses are 2+ on the left side. Right posterior tibial pulse not accessible.       S/p R BKA      Biphasic L DP and monophasic L PT doppler signals        Pulmonary/Chest: Effort normal and breath sounds normal. No accessory muscle usage or stridor. No apnea, no tachypnea and no bradypnea. He is not intubated. No respiratory distress. He has no decreased breath sounds. He has no wheezes. He has no rales. He exhibits no tenderness and no bony tenderness.   Abdominal: He exhibits no distension, no pulsatile liver, no abdominal bruit, no ascites, no pulsatile midline mass and no mass. There is no  tenderness. There is no rebound and no guarding.   Musculoskeletal: Normal range of motion. He exhibits no edema or tenderness.   Lymphadenopathy:     He has no cervical adenopathy.   Neurological: He is alert and oriented to person, place, and time. He has normal reflexes. No cranial nerve deficit. Coordination normal.   Skin: Skin is warm. No rash noted. No erythema. No pallor.       Dressing covering LUE AVF      Temporary line in R IJ/SUB area    Psychiatric: He has a normal mood and affect. His behavior is normal. Judgment and thought content normal.       Significant Labs:       LABS  CBC    Recent Labs  Lab 12/01/17  0029 12/01/17  0409 12/01/17  1337 12/02/17  0309   WBC 6.62 7.08  --  11.46   RBC 3.78* 3.94*  --  4.23*   HGB 9.6* 9.9*  --  10.7*   HCT 28.2* 29.4* 34* 31.2*   * 147*  --  156   MCV 75* 75*  --  74*   MCH 25.4* 25.1*  --  25.3*   MCHC 34.0 33.7  --  34.3     BMP    Recent Labs  Lab 12/01/17  1857 12/02/17  0309 12/02/17  0600   *  128* 129* 130*   K 4.9  4.9 4.8 5.0   CO2 17*  17* 16* 19*   CL 95  95 97 96   BUN 74*  74* 54* 49*   CREATININE 7.4*  7.4* 5.5* 5.2*   *  192* 180* 183*       POCT-Glucose  POCT Glucose   Date Value Ref Range Status   12/02/2017 180 (H) 70 - 110 mg/dL Final   12/01/2017 184 (H) 70 - 110 mg/dL Final   12/01/2017 149 (H) 70 - 110 mg/dL Final   12/01/2017 99 70 - 110 mg/dL Final   12/01/2017 142 (H) 70 - 110 mg/dL Final   11/30/2017 316 (H) 70 - 110 mg/dL Final   11/30/2017 199 (H) 70 - 110 mg/dL Final   11/30/2017 185 (H) 70 - 110 mg/dL Final   11/30/2017 232 (H) 70 - 110 mg/dL Final   11/29/2017 296 (H) 70 - 110 mg/dL Final         Recent Labs  Lab 12/01/17  1559 12/01/17  1857 12/02/17  0309 12/02/17  0600   CALCIUM 7.5*  7.5* 7.4*  7.4* 7.6* 7.7*   MG 1.3*  --  2.4 2.2   PHOS 6.6* 5.8*  5.8* 4.6* 4.5     LFT    Recent Labs  Lab 12/01/17  0409 12/01/17  1559 12/01/17 1857 12/02/17  0309 12/02/17  0600   PROT 5.3* 5.1*  --   --  6.0    ALBUMIN 2.5* 2.4*  2.4* 2.7*  2.7* 2.8* 2.8*   BILITOT 0.6 0.6  --   --  0.7   AST 13 21  --   --  24   ALKPHOS 134 147*  --   --  166*   ALT 6* <5*  --   --  <5*     CE    Recent Labs  Lab 11/27/17  1235 11/28/17  2037   TROPONINI 0.439* 0.521*     LAST HbA1c  Lab Results   Component Value Date    HGBA1C >14.0 (H) 12/01/2017       Lipid panel  Lab Results   Component Value Date    CHOL 59 (L) 12/01/2017    CHOL 79 (L) 03/07/2017    CHOL 79 (L) 08/31/2015     Lab Results   Component Value Date    HDL 31 (L) 12/01/2017    HDL 41 03/07/2017    HDL 30 (L) 08/31/2015     Lab Results   Component Value Date    LDLCALC 18.8 (L) 12/01/2017    LDLCALC 28.6 (L) 03/07/2017    LDLCALC 35.8 (L) 08/31/2015     Lab Results   Component Value Date    TRIG 46 12/01/2017    TRIG 47 03/07/2017    TRIG 66 08/31/2015     Lab Results   Component Value Date    CHOLHDL 52.5 (H) 12/01/2017    CHOLHDL 51.9 (H) 03/07/2017    CHOLHDL 38.0 08/31/2015          Significant Imaging:       Imaging Results          X-Ray Chest 1 View (Final result)  Result time 12/01/17 19:01:36    Final result by Serg Lindsey MD (12/01/17 19:01:36)                 Impression:       1.  Right-sided central venous access catheter tip within the cavoatrial junction.  No immediate complications.    2.  Increasing right-sided pleural effusion and airspace opacity in the right lung base.              Electronically signed by: SERG LINDSEY MD  Date:     12/01/17  Time:    19:01              Narrative:    Exam: [id number]  12/01/17  15:15:56 IMG34 ([facility]) : XR CHEST 1 VIEW    Technique:    Single frontal chest x-ray    Comparison:    11/27/2017    Findings:      There has been insertion of a right-sided central venous access catheter with the tip in the cavoatrial junction.  No evidence of a pneumothorax is identified.  Monitoring EKG leads are present.  The patient is status post median sternotomy.  There also is a previous of prior neck dissection.    The trachea is  unremarkable.  There is stable enlargement of the cardiomediastinal silhouette.  There is increased in the layering right-sided pleural effusion.  No pleural effusion is identified in the left.  There is no evidence of free air beneath the hemidiaphragms.    There is no evidence of pneumomediastinum.  There is an increasing airspace opacity in the right lung base.  The osseous structures demonstrate degenerative changes.  The subcutaneous tissues are within normal limits.                             SURG FL Surgery Fluoro Less Than 1 Hour (Final result)  Result time 12/01/17 14:21:07    Final result by Eder Rashid RT (12/01/17 14:21:07)                 Impression:    See OP Notes for results.             This procedure was auto-finalized by: Virtual Radiologist                 Narrative:    See OP Notes for results.                                US Hemodialysis Access (Final result)  Result time 11/30/17 12:52:23   Procedure changed from US Vein Mapping     Final result by Shyann Kramer MD (11/30/17 12:52:23)                 Impression:     Thrombus fills the majority of the venous outflow for the patient's left upper extremity arteriovenous fistula.      Electronically signed by: SHYANN KRAMER MD  Date:     11/30/17  Time:    12:52              Narrative:    Ultrasound hemodialysis access    The patient denies a left upper extremity brachiocephalic fistula.  Thrombus is seen within the venous outflow at the level of the mid and superior arm.  The anastomosis it does show flow.  There is a 2 x 9 cm hematoma near the surgical site in the left arm.

## 2017-12-02 NOTE — HOSPITAL COURSE
12/2/2017-12/3/2017 Presented to the ER from HD due to issues with dialysis access. Taken for successful thrombecetomy. Cardiology consulted due concern for acute decompensated HF. Concerning for advanced CHF.Na 128 PH 7.2; CXR enlarged cardiac silhouette with increase vascular markings; elevated total bili, Alb 2.8; H/H 10.7/31; HgA1c >14; Lactic acid 1.7. Symptoms concerning for advanced HF with Weight loss, Multi-organ failure, Multiple admission, Hypotensive and life exptancy-guarded to poor. Currently not an candidate for ICD-short life exptancy. Placed on low dose Levophed due to hypotension along with CRRT for volume removal  12/4/2017 Remains on low dose Levophed with CRRT in process. 3.4 liters removed overnight and negative 6.0 liters since admission. BP stable on Levophed drip. No arrhythmias noted on monitor. Patient reports SOB and LE edema improved

## 2017-12-02 NOTE — PROGRESS NOTES
Patient awaken with c/o been very hungry. He does not have a diet in the computer. MD Steele team called verbalized they will come by and see him prior to ordering a diet.  0043 Patient informed on MD's orders verbalized understanding, but he is getting agitated.

## 2017-12-02 NOTE — PROGRESS NOTES
Security at bedside with informing him that they do no have any of his belongings. Patient settled down.

## 2017-12-02 NOTE — PROGRESS NOTES
Progress Note  Nephrology      Consult Requested By: Rishi Steele MD  Reason for Consult: ESRD    SUBJECTIVE:     Review of Systems   Constitutional: Negative for chills and fever.   Respiratory: Negative for cough and shortness of breath.    Cardiovascular: Negative for chest pain and leg swelling.   Gastrointestinal: Negative for nausea.     Patient Active Problem List   Diagnosis    Mitral regurgitation    Coronary artery disease    Cerebrovascular disease    Elevated troponin    Uncontrolled type 2 diabetes mellitus with chronic kidney disease on chronic dialysis, with long-term current use of insulin    Hyperlipidemia LDL goal <70    Hypothyroid    Anemia    Mitral valve replaced    Coronary artery disease involving coronary bypass graft of native heart without angina pectoris    Volume overload    Serum potassium elevated    ESRD on hemodialysis    Essential hypertension    NSTEMI (non-ST elevated myocardial infarction)    Personal history of noncompliance with medical treatment, presenting hazards to health    Hyperglycemia    Fluid overload    Thrombocytopenia    Hypertensive emergency    Acute on chronic systolic heart failure    Pulmonary hypertension    Weakness    Physical deconditioning    Swelling    Hypothyroidism due to acquired atrophy of thyroid    Below knee amputation status, right    Clotted dialysis access       OBJECTIVE:     Medications:   sodium chloride 0.9%   Intravenous Once    sodium chloride 0.9%   Intravenous Once    atorvastatin  40 mg Oral Daily    insulin aspart  5 Units Subcutaneous TID WM    insulin detemir  10 Units Subcutaneous QHS    levothyroxine  125 mcg Oral Before breakfast    lubiprostone  8 mcg Oral BID    pantoprazole  40 mg Oral Daily      norepinephrine bitartrate-D5W 0.09 mcg/kg/min (12/02/17 0800)     Vitals:    12/02/17 0900   BP: 108/72   Pulse: 95   Resp: (!) 7   Temp:      I/O last 3 completed shifts:  In: 709  [I.V.:709]  Out: 1549 [Urine:350; Other:1199]  Physical Exam   Constitutional: He is oriented to person, place, and time. He appears well-developed and well-nourished. No distress.   HENT:   Head: Normocephalic and atraumatic.   Eyes: EOM are normal. No scleral icterus.   Cardiovascular: Normal rate, regular rhythm and intact distal pulses.  Exam reveals no gallop and no friction rub.    Murmur heard.  Pulmonary/Chest: Effort normal and breath sounds normal. He has no wheezes. He has no rales.   Abdominal: Soft. Bowel sounds are normal. He exhibits no distension. There is no tenderness.   Musculoskeletal: Normal range of motion. He exhibits edema (3+ pitting B thighs and pre-sacral ).   R BKA   Lymphadenopathy:     He has no cervical adenopathy.   Neurological: He is alert and oriented to person, place, and time.   Skin: Skin is warm and dry. No rash noted. He is not diaphoretic.   Psychiatric: Thought content normal.     Laboratory:    Recent Labs  Lab 12/01/17  0029 12/01/17  0409 12/01/17  1337 12/02/17  0309   WBC 6.62 7.08  --  11.46   HGB 9.6* 9.9*  --  10.7*   HCT 28.2* 29.4* 34* 31.2*   * 147*  --  156   MONO 9.5  0.6 9.0  --  8.3  1.0       Recent Labs  Lab 12/01/17  1857 12/02/17  0309 12/02/17  0600   *  128* 129* 130*   K 4.9  4.9 4.8 5.0   CL 95  95 97 96   CO2 17*  17* 16* 19*   BUN 74*  74* 54* 49*   CREATININE 7.4*  7.4* 5.5* 5.2*   CALCIUM 7.4*  7.4* 7.6* 7.7*   PHOS 5.8*  5.8* 4.6* 4.5     Labs reviewed  Diagnostic Results:  X-Ray: Reviewed  US: Reviewed  Echo: Reviewed      ASSESSMENT/PLAN:        1. ESRD - HD MWF. Sherlyn boateng???  Seen and examined on CRRT -see below  2.  HTN - hypotensive, stopped Coreg, started midodrine Thursday, yesterday needed epinephrine and NS  intaopertaively for BP drop,   Postop transferred to ICU and Started on Levophed. Shock WBC is not elevated, given his EF 20% and valvular dz most likely cardiogenic, but consider to cover broad spectrum  and check procalcitonin    Will treat as cardiogenic shock for now, consult Cardiology -pt is well known to Dr. Rick  Cont  CVVHD  Increase UF to 150 cc per hour    levophed to maintain MAP >65. Now on 0.09 mcg/kg/min - max concentrate     Hold midodrine and Lasix while on CRRT     3. Anemia - at goal, no need for epo today          Recent Labs  Lab 12/01/17  0029 12/01/17  0409 12/01/17  1337 12/02/17  0309   WBC 6.62 7.08  --  11.46   HGB 9.6* 9.9*  --  10.7*   HCT 28.2* 29.4* 34* 31.2*   * 147*  --  156       Lab Results   Component Value Date    IRON 46 04/19/2017    TIBC 186 (L) 04/19/2017    FERRITIN 1,866 (H) 04/19/2017        4. MBD - Renvela 1600 TID - hold while on CRRT  Lab Results   Component Value Date    .0 (H) 08/31/2015    CALCIUM 7.7 (L) 12/02/2017    PHOS 4.5 12/02/2017       Recent Labs  Lab 12/01/17  1559 12/02/17  0309 12/02/17  0600   MG 1.3* 2.4 2.2     5. Hyperuricemia - on allopurinol - hold while on CRRT  6. Nutrition -   Lab Results   Component Value Date    LABPROT 12.0 03/07/2017    ALBUMIN 2.8 (L) 12/02/2017     Nepro with meals TID. Renal vitamins daily        7. Access - AMARI AVF, clotted, permacath placed by Dr. Ackerman       Thank you for allowing me to participate in the care of your patients  With any question please call [phone]  Ebony Alvarez    [facility]  RAVI Cadet MD, FACP,   SKYLER Dennison MD,   MD ASPEN [place], NP  [address]  [place], [address] [phone]

## 2017-12-02 NOTE — PROGRESS NOTES
Surgery follow up  Catheter working well,  Fistula clotted off again, ef very low with low bp  C/o painful tender area at tail bone will get us of the area  Will need  revision of fistula after medically stabilised.

## 2017-12-02 NOTE — PROGRESS NOTES
Progress Note  Nephrology      Consult Requested By: Rishi Steele MD  Reason for Consult: ESRD    SUBJECTIVE:     Review of Systems   Constitutional: Negative for chills and fever.   Respiratory: Negative for cough and shortness of breath.    Cardiovascular: Negative for chest pain and leg swelling.   Gastrointestinal: Negative for nausea.     Patient Active Problem List   Diagnosis    Mitral regurgitation    Coronary artery disease    Cerebrovascular disease    Elevated troponin    Uncontrolled type 2 diabetes mellitus with chronic kidney disease on chronic dialysis, with long-term current use of insulin    Hyperlipidemia LDL goal <70    Hypothyroid    Anemia    Mitral valve replaced    Coronary artery disease involving coronary bypass graft of native heart without angina pectoris    Volume overload    Serum potassium elevated    ESRD on hemodialysis    Essential hypertension    NSTEMI (non-ST elevated myocardial infarction)    Personal history of noncompliance with medical treatment, presenting hazards to health    Hyperglycemia    Fluid overload    Thrombocytopenia    Hypertensive emergency    Acute on chronic systolic heart failure    Pulmonary hypertension    Weakness    Physical deconditioning    Swelling    Hypothyroidism due to acquired atrophy of thyroid    Below knee amputation status, right    Clotted dialysis access       OBJECTIVE:     Medications:   sodium chloride 0.9%   Intravenous Once    sodium chloride 0.9%   Intravenous Once    allopurinol  100 mg Oral QAM    atorvastatin  40 mg Oral Daily    furosemide  80 mg Oral Daily    insulin aspart  5 Units Subcutaneous TID WM    insulin detemir  10 Units Subcutaneous QHS    levothyroxine  125 mcg Oral Before breakfast    lubiprostone  8 mcg Oral BID    magnesium sulfate IVPB  4 g Intravenous Once    midodrine  5 mg Oral BID WM    pantoprazole  40 mg Oral Daily    sevelamer carbonate  1,600 mg Oral TID WM       norepinephrine bitartrate-D5W 0.2 mcg/kg/min (12/01/17 1655)     Vitals:    12/01/17 1630   BP: (!) 78/55   Pulse: (!) 118   Resp: 11   Temp:      I/O last 3 completed shifts:  In: 455 [P.O.:430; I.V.:25]  Out: -   Physical Exam   Constitutional: He is oriented to person, place, and time. He appears well-developed and well-nourished. No distress.   HENT:   Head: Normocephalic and atraumatic.   Eyes: EOM are normal. No scleral icterus.   Cardiovascular: Normal rate, regular rhythm and intact distal pulses.  Exam reveals no gallop and no friction rub.    Murmur heard.  Pulmonary/Chest: Effort normal and breath sounds normal. He has no wheezes. He has no rales.   Abdominal: Soft. Bowel sounds are normal. He exhibits no distension. There is no tenderness.   Musculoskeletal: Normal range of motion. He exhibits edema (4+ pitting B thighs and pre-sacral).   B BKA   Lymphadenopathy:     He has no cervical adenopathy.   Neurological: He is alert and oriented to person, place, and time.   Skin: Skin is warm and dry. No rash noted. He is not diaphoretic.   Psychiatric: Thought content normal.     Laboratory:    Recent Labs  Lab 11/29/17  1357 12/01/17  0029 12/01/17  0409 12/01/17  1337   WBC 8.97 6.62 7.08  --    HGB 12.6* 9.6* 9.9*  --    HCT 36.6* 28.2* 29.4* 34*    127* 147*  --    MONO 7.9  0.7 9.5  0.6 9.0  --        Recent Labs  Lab 12/01/17  0029 12/01/17  0409 12/01/17  1559   * 124* 125*  125*   K 5.0 5.1 4.6  4.6   CL 92* 92* 94*  94*   CO2 18* 17* 15*  15*   BUN 79* 79* 80*  80*   CREATININE 8.2* 8.2* 8.2*  8.2*   CALCIUM 7.0* 7.0* 7.5*  7.5*   PHOS 6.1* 6.1* 6.6*     Labs reviewed  Diagnostic Results:  X-Ray: Reviewed  US: Reviewed  Echo: Reviewed      ASSESSMENT/PLAN:        1. ESRD - HD MWF. Sherlyn boateng???  Start CRRT -see below  2.  HTN - hypotensive, stopped Coreg, started midodrine yesterday, needed epinephrine and NS today intaopertaively for BP drop,   Start on Levophed, PT in  shock. WBC is not elevated, given his EF 20% and valvular dz most likely cardiogenic, but consider to cover broad spectrum and check procalcitonin    Will treat as cardiogenic shock for now, consult Cardiology -pt is well known to Dr. Rick  Will start CVVHD with 100 cc per hour net UF and levophed to maintain MAP >65. Hold midodrine and Lasix while on CRRT     3. Anemia - at goal, no need for epo today          Recent Labs  Lab 11/29/17  1357 12/01/17  0029 12/01/17  0409 12/01/17  1337   WBC 8.97 6.62 7.08  --    HGB 12.6* 9.6* 9.9*  --    HCT 36.6* 28.2* 29.4* 34*    127* 147*  --        Lab Results   Component Value Date    IRON 46 04/19/2017    TIBC 186 (L) 04/19/2017    FERRITIN 1,866 (H) 04/19/2017        4. MBD - Renvela 1600 TID - hold while on CRRT  Lab Results   Component Value Date    .0 (H) 08/31/2015    CALCIUM 7.5 (L) 12/01/2017    CALCIUM 7.5 (L) 12/01/2017    PHOS 6.6 (H) 12/01/2017       Recent Labs  Lab 11/29/17  1357 12/01/17  0029 12/01/17  1559   MG 1.7 1.4* 1.3*     5. Hyperuricemia - on allopurinol - hold while on CRRT  6. Nutrition -   Lab Results   Component Value Date    LABPROT 12.0 03/07/2017    ALBUMIN 2.4 (L) 12/01/2017    ALBUMIN 2.4 (L) 12/01/2017     Nepro with meals TID. Renal vitamins daily        7. Access - AMARI AVF, clotted, permacath placed by Dr. Ackerman       Thank you for allowing me to participate in the care of your patients  With any question please call [phone]  Ebony Alvarez    [facility]  RAVI Cadet MD, FACSKYLER DAVISON MD,   MD ASPEN [place], NP  [address]  [place], [address] [phone]

## 2017-12-02 NOTE — PROGRESS NOTES
"Patient is awaken, fussing and accusing [employer] dept of stealing his teeth. He is requesting to see the "Head Nurse" to complaint. Charge nurse Casey at bedside with explaining patient care and reorienting him.   0540 Security called and informed them on patient what the patient is alleged  "

## 2017-12-02 NOTE — PROGRESS NOTES
Pt continues to have HR 120s sustained, Dr. Vincent notified, MD states will order STAT EKG, tech notified.   MD came to unit assessed pt and reviewed EKG no new orders given at this time.   Care is ongoing will continue to monitor pt status.      12/01/17 1750   Vital Signs   Pulse (!) 129   Resp 12   SpO2 100 %   /82   MAP (mmHg) 98   Art Line   Arterial Line /69   Arterial Line MAP (mmHg) 88 mmHg

## 2017-12-02 NOTE — ASSESSMENT & PLAN NOTE
Concerning for advanced CHF   Weight loss   Multi-organ failure   Multiple admission   Hypotensive     Life exptancy-guarded to poor      Not an candidate for ICD-short life exptancy

## 2017-12-02 NOTE — HPI
67 year old male well known to [facility] admitted with decompensated CHF with a reduced EF complicated with multiorgan failure. Currently on CRRT and low dose levophed to maintain  MAP >65.       He has 3 vessel CAD s/p RCA PCI, 70% LCX, and patent LAD, ICM with EF 20, BiV failure, bi-atrial enlargement, ESRD on HD MWF, HTN, HLP, PAD, s/p R BKA, venous insufficiency with L leg edema, weight loss due to cardiac cachexia, s/p MVR, DM, hyponatremia, abnormal LFTs, elevated T.Bilirubin from hepatic congestion, BNP >4900 (9/2017), and previous CVA (2013). He is complaining of feeling weak, early satiety, weight loss, and left leg edema. US 9/2017 revealed no DVT. His overall presentation is concerning for severely advanced CHF with multi organ failure.

## 2017-12-02 NOTE — CONSULTS
[facility]  Cardiology  Consult Note    Patient Name: Williams Bland  MRN: [id number]  Admission Date: 11/29/2017  Hospital Length of Stay: 2 days  Code Status: Prior   Attending Provider:   Consulting Provider: Nasim Torres MD  Primary Care Physician: Ronan Caldwell MD  Principal Problem:Acute on chronic systolic heart failure    Patient information was obtained from patient and chart review    Consults  Subjective:     Chief Complaint:  Weak and shortness of breath     HPI:     67 year old male well known to [facility] admitted with decompensated CHF with a reduced EF complicated with multiorgan failure. Currently on CRRT and low dose levophed to maintain  MAP >65.       He has 3 vessel CAD s/p RCA PCI, 70% LCX, and patent LAD, ICM with EF 20, BiV failure, bi-atrial enlargement, ESRD on HD MWF, HTN, HLP, PAD, s/p R BKA, venous insufficiency with L leg edema, weight loss due to cardiac cachexia, s/p MVR, DM, hyponatremia, abnormal LFTs, elevated T.Bilirubin from hepatic congestion, BNP >4900 (9/2017), and previous CVA (2013). He is complaining of feeling weak, early satiety, weight loss, and left leg edema. US 9/2017 revealed no DVT. His overall presentation is concerning for severely advanced CHF with multi organ failure.    Past Medical History:   Diagnosis Date    CHF (congestive heart failure)     Coronary artery disease     CVA (cerebral vascular accident) 2013    Diabetes mellitus     ESRD (end stage renal disease)     Hypertension     Stroke        Past Surgical History:   Procedure Laterality Date    CARDIAC SURGERY      PTCA WITH STENT PLACEMENT    THYROID SURGERY         Review of patient's allergies indicates:  No Known Allergies    No current facility-administered medications on file prior to encounter.      Current Outpatient Prescriptions on File Prior to Encounter   Medication Sig    allopurinol (ZYLOPRIM) 100 MG tablet Take 100 mg by mouth every morning.      amlodipine (NORVASC) 10 MG tablet Take 1 tablet (10 mg total) by mouth once daily.    aspirin (ECOTRIN) 81 MG EC tablet Take 1 tablet (81 mg total) by mouth once daily.    atorvastatin (LIPITOR) 40 MG tablet Take 1 tablet (40 mg total) by mouth once daily.    blood sugar diagnostic (CONTOUR TEST STRIPS) Strp USE TO CHECK BLOOD SUGAR THREE TIMES A DAY.    carvedilol (COREG) 3.125 MG tablet Take 1 tablet (3.125 mg total) by mouth 2 (two) times daily with meals.    clopidogrel (PLAVIX) 75 mg tablet Take 1 tablet (75 mg total) by mouth once daily.    clopidogrel (PLAVIX) 75 mg tablet Take 1 tablet (75 mg total) by mouth once daily.    furosemide (LASIX) 80 MG tablet Take 1 tablet (80 mg total) by mouth once daily.    hydrocodone-acetaminophen 5-325mg (NORCO) 5-325 mg per tablet Take 1 tablet by mouth every 6 (six) hours as needed for Pain.    insulin aspart (NOVOLOG) 100 unit/mL InPn pen Inject 5 Units into the skin 3 (three) times daily with meals.    insulin detemir (LEVEMIR FLEXTOUCH) 100 unit/mL (3 mL) SubQ InPn pen Inject 10 Units into the skin every evening.    isosorbide mononitrate (IMDUR) 60 MG 24 hr tablet Take 1 tablet (60 mg total) by mouth once daily.    levothyroxine (SYNTHROID) 125 MCG tablet Take 125 mcg by mouth once daily.    levothyroxine (SYNTHROID) 125 MCG tablet Take 1 tablet (125 mcg total) by mouth before breakfast.    lisinopril (PRINIVIL,ZESTRIL) 20 MG tablet Take 1 tablet (20 mg total) by mouth once daily.    lubiprostone (AMITIZA) 8 MCG Cap Take 8 mcg by mouth 2 (two) times daily.    miconazole (MICOTIN) 2 % cream Apply topically 2 (two) times daily.    oxycodone-acetaminophen (PERCOCET)  mg per tablet Take 1 tablet by mouth every 6 (six) hours as needed for Pain.    pantoprazole (PROTONIX) 40 MG tablet Take 1 tablet by mouth daily.    pregabalin (LYRICA) 75 MG capsule Take 75 mg by mouth 2 (two) times daily. Take before and after dialysis    protein (ENSURE HIGH  PROTEIN) Powd Take by mouth 3 (three) times daily. 3 teaspoons PO TID    sevelamer carbonate (RENVELA) 800 mg Tab Take 2 tablets (1,600 mg total) by mouth 3 (three) times daily with meals.    silver sulfADIAZINE 1% (SILVADENE) 1 % cream Apply 1 application topically 2 (two) times daily.    trazodone (DESYREL) 100 MG tablet Take 1 tablet (100 mg total) by mouth every evening.     Family History     Problem Relation (Age of Onset)    Hypertension Father        Social History Main Topics    Smoking status: Former Smoker     Quit date: 9/22/2000    Smokeless tobacco: Never Used    Alcohol use No    Drug use: No    Sexual activity: Not Currently     Review of Systems   Constitution: Positive for weakness and weight loss. Negative for diaphoresis, night sweats and weight gain.   HENT: Negative for congestion.    Eyes: Negative for blurred vision, discharge and double vision.   Cardiovascular: Positive for dyspnea on exertion and leg swelling. Negative for chest pain, claudication, cyanosis, irregular heartbeat, near-syncope, orthopnea, palpitations, paroxysmal nocturnal dyspnea and syncope.   Respiratory: Positive for shortness of breath, sleep disturbances due to breathing and snoring. Negative for cough and wheezing.    Endocrine: Negative for cold intolerance, heat intolerance and polyphagia.   Hematologic/Lymphatic: Negative for adenopathy and bleeding problem. Does not bruise/bleed easily.   Skin: Negative for dry skin and nail changes.   Musculoskeletal: Negative for arthritis, back pain, falls, joint pain, myalgias and neck pain.   Gastrointestinal: Positive for bloating. Negative for abdominal pain, change in bowel habit and constipation.   Genitourinary: Negative for bladder incontinence, dysuria, flank pain, genital sores and missed menses.   Neurological: Negative for aphonia, brief paralysis, difficulty with concentration and dizziness.   Psychiatric/Behavioral: Positive for depression. Negative for  altered mental status and memory loss. The patient is nervous/anxious. The patient does not have insomnia.    Allergic/Immunologic: Negative for environmental allergies.     Objective:     Vital Signs (Most Recent):  Temp: 97.4 °F (36.3 °C) (12/02/17 1115)  Pulse: 101 (12/02/17 1200)  Resp: 14 (12/02/17 1200)  BP: 128/78 (12/02/17 1200)  SpO2: 100 % (12/02/17 1200) Vital Signs (24h Range):  Temp:  [97 °F (36.1 °C)-97.9 °F (36.6 °C)] 97.4 °F (36.3 °C)  Pulse:  [] 101  Resp:  [7-20] 14  SpO2:  [96 %-100 %] 100 %  BP: ()/(30-88) 128/78  Arterial Line BP: ()/(41-98) 116/56     Weight: 76.7 kg (169 lb 1.5 oz)  Body mass index is 23.58 kg/m².    SpO2: 100 %  O2 Device (Oxygen Therapy): room air      Intake/Output Summary (Last 24 hours) at 12/02/17 1437  Last data filed at 12/02/17 1418   Gross per 24 hour   Intake           760.44 ml   Output             2142 ml   Net         -1381.56 ml       Lines/Drains/Airways     Central Venous Catheter Line                 Hemodialysis Catheter 12/01/17 1342 right subclavian 1 day          Drain                 Hemodialysis AV Fistula Left upper arm -- days         Hemodialysis AV Fistula Left upper arm -- days                Physical Exam   Constitutional: He is oriented to person, place, and time. He appears well-developed and well-nourished. He is not intubated.   HENT:   Head: Normocephalic and atraumatic.   Right Ear: External ear normal.   Left Ear: External ear normal.   Mouth/Throat: Oropharynx is clear and moist.   Eyes: Conjunctivae and EOM are normal. Pupils are equal, round, and reactive to light. Right eye exhibits no discharge. Left eye exhibits no discharge. No scleral icterus.   Neck: Normal range of motion. Neck supple. Normal carotid pulses, no hepatojugular reflux and no JVD present. Carotid bruit is not present. No tracheal deviation present. No thyromegaly present.   Cardiovascular: Normal rate, regular rhythm, S1 normal and S2 normal.    Occasional extrasystoles are present. PMI is not displaced.  Exam reveals no gallop, no S3, no distant heart sounds, no friction rub and no midsystolic click.    Murmur heard.   Systolic murmur is present with a grade of 2/6  at the lower left sternal border  Pulses:       Carotid pulses are 2+ on the right side, and 2+ on the left side.       Radial pulses are 2+ on the right side, and 2+ on the left side.        Femoral pulses are 2+ on the right side, and 2+ on the left side.       Popliteal pulses are 2+ on the right side, and 2+ on the left side.        Dorsalis pedis pulses are 2+ on the left side. Right dorsalis pedis pulse not accessible.        Posterior tibial pulses are 2+ on the left side. Right posterior tibial pulse not accessible.       S/p R BKA      Biphasic L DP and monophasic L PT doppler signals        Pulmonary/Chest: Effort normal and breath sounds normal. No accessory muscle usage or stridor. No apnea, no tachypnea and no bradypnea. He is not intubated. No respiratory distress. He has no decreased breath sounds. He has no wheezes. He has no rales. He exhibits no tenderness and no bony tenderness.   Abdominal: He exhibits no distension, no pulsatile liver, no abdominal bruit, no ascites, no pulsatile midline mass and no mass. There is no tenderness. There is no rebound and no guarding.   Musculoskeletal: Normal range of motion. He exhibits no edema or tenderness.   Lymphadenopathy:     He has no cervical adenopathy.   Neurological: He is alert and oriented to person, place, and time. He has normal reflexes. No cranial nerve deficit. Coordination normal.   Skin: Skin is warm. No rash noted. No erythema. No pallor.       Dressing covering LUE AVF      Temporary line in R IJ/SUB area    Psychiatric: He has a normal mood and affect. His behavior is normal. Judgment and thought content normal.       Significant Labs:       LABS  CBC    Recent Labs  Lab 12/01/17  0029 12/01/17  0409 12/01/17  1337  12/02/17  0309   WBC 6.62 7.08  --  11.46   RBC 3.78* 3.94*  --  4.23*   HGB 9.6* 9.9*  --  10.7*   HCT 28.2* 29.4* 34* 31.2*   * 147*  --  156   MCV 75* 75*  --  74*   MCH 25.4* 25.1*  --  25.3*   MCHC 34.0 33.7  --  34.3     BMP    Recent Labs  Lab 12/01/17  1857 12/02/17  0309 12/02/17  0600   *  128* 129* 130*   K 4.9  4.9 4.8 5.0   CO2 17*  17* 16* 19*   CL 95  95 97 96   BUN 74*  74* 54* 49*   CREATININE 7.4*  7.4* 5.5* 5.2*   *  192* 180* 183*       POCT-Glucose  POCT Glucose   Date Value Ref Range Status   12/02/2017 180 (H) 70 - 110 mg/dL Final   12/01/2017 184 (H) 70 - 110 mg/dL Final   12/01/2017 149 (H) 70 - 110 mg/dL Final   12/01/2017 99 70 - 110 mg/dL Final   12/01/2017 142 (H) 70 - 110 mg/dL Final   11/30/2017 316 (H) 70 - 110 mg/dL Final   11/30/2017 199 (H) 70 - 110 mg/dL Final   11/30/2017 185 (H) 70 - 110 mg/dL Final   11/30/2017 232 (H) 70 - 110 mg/dL Final   11/29/2017 296 (H) 70 - 110 mg/dL Final         Recent Labs  Lab 12/01/17  1559 12/01/17  1857 12/02/17  0309 12/02/17  0600   CALCIUM 7.5*  7.5* 7.4*  7.4* 7.6* 7.7*   MG 1.3*  --  2.4 2.2   PHOS 6.6* 5.8*  5.8* 4.6* 4.5     LFT    Recent Labs  Lab 12/01/17  0409 12/01/17  1559 12/01/17  1857 12/02/17  0309 12/02/17  0600   PROT 5.3* 5.1*  --   --  6.0   ALBUMIN 2.5* 2.4*  2.4* 2.7*  2.7* 2.8* 2.8*   BILITOT 0.6 0.6  --   --  0.7   AST 13 21  --   --  24   ALKPHOS 134 147*  --   --  166*   ALT 6* <5*  --   --  <5*     CE    Recent Labs  Lab 11/27/17  1235 11/28/17 2037   TROPONINI 0.439* 0.521*     LAST HbA1c  Lab Results   Component Value Date    HGBA1C >14.0 (H) 12/01/2017       Lipid panel  Lab Results   Component Value Date    CHOL 59 (L) 12/01/2017    CHOL 79 (L) 03/07/2017    CHOL 79 (L) 08/31/2015     Lab Results   Component Value Date    HDL 31 (L) 12/01/2017    HDL 41 03/07/2017    HDL 30 (L) 08/31/2015     Lab Results   Component Value Date    LDLCALC 18.8 (L) 12/01/2017    LDLCALC 28.6 (L)  03/07/2017    LDLCALC 35.8 (L) 08/31/2015     Lab Results   Component Value Date    TRIG 46 12/01/2017    TRIG 47 03/07/2017    TRIG 66 08/31/2015     Lab Results   Component Value Date    CHOLHDL 52.5 (H) 12/01/2017    CHOLHDL 51.9 (H) 03/07/2017    CHOLHDL 38.0 08/31/2015          Significant Imaging:       Imaging Results          X-Ray Chest 1 View (Final result)  Result time 12/01/17 19:01:36    Final result by Serg Lindsey MD (12/01/17 19:01:36)                 Impression:       1.  Right-sided central venous access catheter tip within the cavoatrial junction.  No immediate complications.    2.  Increasing right-sided pleural effusion and airspace opacity in the right lung base.              Electronically signed by: SERG LINDSEY MD  Date:     12/01/17  Time:    19:01              Narrative:    Exam: [id number]  12/01/17  15:15:56 IMG34 ([facility]) : XR CHEST 1 VIEW    Technique:    Single frontal chest x-ray    Comparison:    11/27/2017    Findings:      There has been insertion of a right-sided central venous access catheter with the tip in the cavoatrial junction.  No evidence of a pneumothorax is identified.  Monitoring EKG leads are present.  The patient is status post median sternotomy.  There also is a previous of prior neck dissection.    The trachea is unremarkable.  There is stable enlargement of the cardiomediastinal silhouette.  There is increased in the layering right-sided pleural effusion.  No pleural effusion is identified in the left.  There is no evidence of free air beneath the hemidiaphragms.    There is no evidence of pneumomediastinum.  There is an increasing airspace opacity in the right lung base.  The osseous structures demonstrate degenerative changes.  The subcutaneous tissues are within normal limits.                             SURG FL Surgery Fluoro Less Than 1 Hour (Final result)  Result time 12/01/17 14:21:07    Final result by Eder Rashid RT (12/01/17 14:21:07)                  Impression:    See OP Notes for results.             This procedure was auto-finalized by: Virtual Radiologist                 Narrative:    See OP Notes for results.                                US Hemodialysis Access (Final result)  Result time 11/30/17 12:52:23   Procedure changed from US Vein Mapping     Final result by Maile Kramer MD (11/30/17 12:52:23)                 Impression:     Thrombus fills the majority of the venous outflow for the patient's left upper extremity arteriovenous fistula.      Electronically signed by: MAILE KRAMER MD  Date:     11/30/17  Time:    12:52              Narrative:    Ultrasound hemodialysis access    The patient denies a left upper extremity brachiocephalic fistula.  Thrombus is seen within the venous outflow at the level of the mid and superior arm.  The anastomosis it does show flow.  There is a 2 x 9 cm hematoma near the surgical site in the left arm.                                Assessment and Plan:     * Acute on chronic systolic heart failure        Concerning for advanced CHF   Weight loss   Multi-organ failure   Multiple admission   Hypotensive     Life exptancy-guarded to poor      Not an candidate for ICD-short life exptancy                Mitral regurgitation        S/p MV replacement-bioprosthesis              VTE Risk Mitigation         Ordered     Medium Risk of VTE  Once      12/01/17 1459     Place JENNIFER hose  Until discontinued      12/01/17 1459     Place sequential compression device  Until discontinued      12/01/17 1459            Reviewed echos from 9/15/2017 and 3/8/2017      Rec:      Continue with current plan   CRRT   Low dose levophed while maintaining MAP > 65   Aspirin   Statin as tolerated   Plavix       Hold HTN agents for now        Replace electrolytes  No ICD-not a candidate  DVT/GI prophylaxis  Code status  Medical therapy for CAD and valvular disease          We will continue to follow with you        Thank you for your consult.      Nasim Torres MD  Cardiology   [facility]

## 2017-12-02 NOTE — PLAN OF CARE
Problem: Patient Care Overview  Goal: Plan of Care Review  Outcome: Ongoing (interventions implemented as appropriate)  Pt on RA with sats of  100%, Will continue to monitor.

## 2017-12-03 NOTE — PLAN OF CARE
Upon am assessment pt c/o R PIV and R nona site causing discomfort, R hand swollen, R Nona positional and unable to obtain reading, Sperry d/c per policy, dsg placed over sites, and sites monitored throughout the day, pt c/o numbness and coolness to hand, hand feels warm, pulse present, Dr. Isaac and Dr. Steele both aware of pt complaints, and assessed on rounds, Encouraged pt to keep BUE elevated on pillows, however pt continues to lay on hands and move them down, pt agreeable to repositioning BUE every hour.  Pt agitated at times throughout the day, pt assisted with repositioning, Encouraged to keep BUE elevated on pillows. Pt feels cold and states relief and comfort after repositioning and several blankets.   CRRT ran without any issues today DFR and K+ bath changed per orders.   RIJ permacath site dsg changed per policy, LUE incisional site dsg removed per Dr. Virgen and orders received to cleanse and redress with xeroform, 4X4 sterile gauze, wraped with kerlex and secrued with metapore tape. Pt tolerated well.   Pt ate meals well, requires a lot of coaching and positive reinforcement. Meal set up. Pain medication admin per md order x2 today.   Pt denies any further needs and nad noted.   Care ongoing, pt status monitored throughout the day. Will continue to monitor pt status and report off to RN to assume care of pt tonight.

## 2017-12-03 NOTE — PROGRESS NOTES
Progress Note  Nephrology      Consult Requested By: Rishi Steele MD  Reason for Consult: ESRD    SUBJECTIVE:     Review of Systems   Constitutional: Negative for chills and fever.   Respiratory: Negative for cough and shortness of breath.    Cardiovascular: Negative for chest pain and leg swelling.   Gastrointestinal: Negative for nausea.     Patient Active Problem List   Diagnosis    Mitral regurgitation    Coronary artery disease    Cerebrovascular disease    Elevated troponin    Uncontrolled type 2 diabetes mellitus with chronic kidney disease on chronic dialysis, with long-term current use of insulin    Hyperlipidemia LDL goal <70    Hypothyroid    Anemia    Mitral valve replaced    Coronary artery disease involving coronary bypass graft of native heart without angina pectoris    Volume overload    Serum potassium elevated    ESRD on hemodialysis    Essential hypertension    NSTEMI (non-ST elevated myocardial infarction)    Personal history of noncompliance with medical treatment, presenting hazards to health    Hyperglycemia    Fluid overload    Thrombocytopenia    Hypertensive emergency    Acute on chronic systolic heart failure    Pulmonary hypertension    Weakness    Physical deconditioning    Swelling    Hypothyroidism due to acquired atrophy of thyroid    Below knee amputation status, right    Clotted dialysis access       OBJECTIVE:     Medications:   sodium chloride 0.9%   Intravenous Once    sodium chloride 0.9%   Intravenous Once    atorvastatin  40 mg Oral Daily    insulin aspart  5 Units Subcutaneous TID WM    insulin detemir  10 Units Subcutaneous QHS    levothyroxine  125 mcg Oral Before breakfast    lubiprostone  8 mcg Oral BID    pantoprazole  40 mg Oral Daily    pregabalin  75 mg Oral BID      norepinephrine bitartrate-D5W 0.02 mcg/kg/min (12/03/17 0052)     Vitals:    12/03/17 0844   BP:    Pulse:    Resp:    Temp: 97.4 °F (36.3 °C)     I/O last 3  completed shifts:  In: 812.9 [P.O.:400; I.V.:162.9; IV Piggyback:250]  Out: 4632 [Other:4632]  Physical Exam   Constitutional: He is oriented to person, place, and time. He appears well-developed and well-nourished. No distress.   HENT:   Head: Normocephalic and atraumatic.   Eyes: EOM are normal. No scleral icterus.   Cardiovascular: Normal rate, regular rhythm and intact distal pulses.  Exam reveals no gallop and no friction rub.    Murmur heard.  Pulmonary/Chest: Effort normal and breath sounds normal. He has no wheezes. He has no rales.   Abdominal: Soft. Bowel sounds are normal. He exhibits no distension. There is no tenderness.   Musculoskeletal: Normal range of motion. He exhibits edema (3+ pitting B thighs and pre-sacral ).   R BKA   Lymphadenopathy:     He has no cervical adenopathy.   Neurological: He is alert and oriented to person, place, and time.   Skin: Skin is warm and dry. No rash noted. He is not diaphoretic.   Psychiatric: Thought content normal.     Laboratory:    Recent Labs  Lab 12/01/17  0029 12/01/17  0409 12/01/17  1337 12/02/17  0309   WBC 6.62 7.08  --  11.46   HGB 9.6* 9.9*  --  10.7*   HCT 28.2* 29.4* 34* 31.2*   * 147*  --  156   MONO 9.5  0.6 9.0  --  8.3  1.0       Recent Labs  Lab 12/02/17  1330 12/02/17  2246 12/03/17  0510   * 135* 137   K 4.8 4.4 4.4    100 102   CO2 18* 24 24   BUN 41* 36* 32*   CREATININE 4.6* 4.3* 4.1*   CALCIUM 7.8* 7.8* 7.8*   PHOS 4.4 3.9 3.8     Labs reviewed  Diagnostic Results:  X-Ray: Reviewed  US: Reviewed  Echo: Reviewed      ASSESSMENT/PLAN:        1. ESRD - HD MWF. Sherlyn boateng???  Seen and examined on CRRT -see below  2.  HTN - hypotensive, stopped Coreg, started midodrine Thursday, yesterday needed epinephrine and NS  intaopertaively for BP drop,   Postop transferred to ICU and Started on Levophed. Shock WBC is not elevated, given his EF 20% and valvular dz most likely cardiogenic, but consider to cover broad spectrum and  check procalcitonin      Cont  CVVHD   UF to 150 cc per hour - seen and examined on CRRT    levophed to maintain MAP >65. Now on 0.05 mcg/kg/min - max concentrate     Hold midodrine and Lasix while on CRRT     3. Anemia - at goal, no need for epo today          Recent Labs  Lab 12/01/17  0029 12/01/17  0409 12/01/17  1337 12/02/17  0309   WBC 6.62 7.08  --  11.46   HGB 9.6* 9.9*  --  10.7*   HCT 28.2* 29.4* 34* 31.2*   * 147*  --  156       Lab Results   Component Value Date    IRON 46 04/19/2017    TIBC 186 (L) 04/19/2017    FERRITIN 1,866 (H) 04/19/2017        4. MBD - Renvela 1600 TID - hold while on CRRT  Lab Results   Component Value Date    .0 (H) 08/31/2015    CALCIUM 7.8 (L) 12/03/2017    PHOS 3.8 12/03/2017       Recent Labs  Lab 12/02/17  0600 12/02/17  1330 12/02/17  2246   MG 2.2 2.1 2.2     5. Hyperuricemia - on allopurinol - hold while on CRRT  6. Nutrition -   Lab Results   Component Value Date    LABPROT 12.0 03/07/2017    ALBUMIN 2.6 (L) 12/03/2017     Nepro with meals TID. Renal vitamins daily        7. Access - AMARI AVF, clotted, permacath placed by Dr. Akcerman       Thank you for allowing me to participate in the care of your patients  With any question please call [phone]  Ebony Alvarez    [facility]  RAVI Cadet MD, FACP,   MAlexei Dennison MD,   OM[facility] ASPEN Altamirano, NP  [address]  [place], [address] [phone]

## 2017-12-03 NOTE — PROGRESS NOTES
[facility] Internal Medicine Resident HO-3 Progress Note    Subjective:      Mr. Bland reports that he is feeling well. He is tolerating PO without any nausea, vomiting, or diarrhea. His nurse states that he has been tolerating the CVVHD well but she has not turned off the levophed yet because he is on the lowest dose.      Objective:   Last 24 Hour Vital Signs:  BP  Min: 75/52  Max: 132/82  Temp  Av.6 °F (36.4 °C)  Min: 97.4 °F (36.3 °C)  Max: 97.9 °F (36.6 °C)  Pulse  Av.4  Min: 93  Max: 104  Resp  Av.2  Min: 7  Max: 29  SpO2  Av.9 %  Min: 99 %  Max: 100 %  Weight  Av.6 kg (171 lb 1.2 oz)  Min: 77.6 kg (171 lb 1.2 oz)  Max: 77.6 kg (171 lb 1.2 oz)  I/O last 3 completed shifts:  In: 812.9 [P.O.:400; I.V.:162.9; IV Piggyback:250]  Out: 4632 [Other:4632]    Physical Examination:  Gen: Thin, resting comfortably, NAD  HEENT: EOMI, no scleral icterus, Clear conjunctiva  Cv: RRR. Normal S1 and S2, right chest with access and dialysis ongoing  Lungs: CTAB, respirations even and non labored. No rales, crackles, rhonchi, or wheezing  Gi: thin, soft, non tender to palpation. Normoactive bowel sounds  Extrem: warm, with edema of BLE. Right BKA.  Left upper arm sutures clean and without erythema.    Skin: dry, warm, no rashes or breakdown  Neuro: AAOx3, cooperative. No focal deficits.     Laboratory:  No CBC at time of note    Na 137  K 4.4  BUN 32  Cr 7.8  Gluc 247  Alb 2.6  Phos 3.8    Random vanc 9.7    Current Medications:     Infusions:   norepinephrine bitartrate-D5W 0.02 mcg/kg/min (17 0052)        Scheduled:   sodium chloride 0.9%   Intravenous Once    sodium chloride 0.9%   Intravenous Once    atorvastatin  40 mg Oral Daily    insulin aspart  5 Units Subcutaneous TID WM    insulin detemir  10 Units Subcutaneous QHS    levothyroxine  125 mcg Oral Before breakfast    lubiprostone  8 mcg Oral BID    pantoprazole  40 mg Oral Daily        PRN:  sodium chloride 0.9%, sodium chloride 0.9%,  acetaminophen, dextrose 50%, dextrose 50%, glucagon (human recombinant), glucose, glucose, hydrocodone-acetaminophen 10-325mg, hydrocodone-acetaminophen 5-325mg, insulin aspart, magnesium sulfate IVPB, sodium phosphate IVPB, sodium phosphate IVPB, sodium phosphate IVPB    Assessment:     Williams Bland is a 67 y.o.male with  Patient Active Problem List    Diagnosis Date Noted    Clotted dialysis access 11/29/2017    Below knee amputation status, right 11/22/2017    Hypothyroidism due to acquired atrophy of thyroid 09/17/2017    Swelling 09/14/2017    Physical deconditioning 07/18/2017    Weakness 07/17/2017    Hyperglycemia 04/19/2017    Fluid overload 04/19/2017    Thrombocytopenia 04/19/2017    Hypertensive emergency 04/19/2017    Acute on chronic systolic heart failure 04/19/2017    Pulmonary hypertension 04/19/2017    NSTEMI (non-ST elevated myocardial infarction) 03/07/2017    Personal history of noncompliance with medical treatment, presenting hazards to health 03/07/2017    Serum potassium elevated 03/11/2016    ESRD on hemodialysis     Essential hypertension     Coronary artery disease involving coronary bypass graft of native heart without angina pectoris 08/29/2015    Volume overload 08/29/2015    Mitral valve replaced 03/26/2015    Anemia 12/08/2014    Hyperlipidemia LDL goal <70 09/18/2014    Hypothyroid 09/18/2014    Uncontrolled type 2 diabetes mellitus with chronic kidney disease on chronic dialysis, with long-term current use of insulin 06/02/2014    Elevated troponin 05/30/2014    Mitral regurgitation 05/08/2014    Coronary artery disease 05/08/2014    Cerebrovascular disease 05/08/2014        Plan:      Cardiogenic Shock due to Volume Overload 2/2 CKD5 and HFrEF  - On admission, patient missed HD 2/2 clotted fistula  - At home on Coreg, Lisinopril, Imdur, Lasix  - Recommend HD as per Nephrology, currently on CRRT  -Nephro believe his clotting may be due to hypotension  during HD (got to 70/32). They have stopped coreg. Patient also not on imdur or lisinopril while currently inpatient.   - on CVVHD with 3.5L off and now with improved BP weaning levophed  holding lasix currently  - well known to Dr. Hammond who is consulted  not a candidate for ICD    AVF Malfunction  - Sent from dialysis for malfunctioning dialysis graft and suspected clot  - 11/30  HD Access: Thrombus fills the majority of the venous outflow of LUE  - Repair as per Vascular Surgery (Dr. Ackerman) 11/30 but then clotted off again during HD attempt  - Vascular Surg 12/1 to declot again and place Parish catheter  Became hypotensive during surgery requiring NS bolus and Lovenox > Transferred to ICU for closer monitoring and initiating of CRRT and Levophed for goal MAP >65  - Per Dr. Ackerman, will need revision of fistula after medically stable      HTN  - Known to Dr. Hammond who says his baseline BP is low  on admission, /52  - Holding home amlodipine, coreg, imdur, lisinopril for levophed s/p HoTN in surgery Friday  - wean today as tolerated    Elevated Procalcitonin  - Procalcitonin elevated at 0.72, lactate 1.7  - consider repeating procal  BCx pending  vanc dose x1  random vanc 9 today  - source could be gluteal cleft induration but will look for additional source if conditions worsens  - WBC went from 11 to 7 so will give another single dose of vanc today    Gluteal Cleft Induration  - checked by Dr. Ackerman  no current interventions indicated       DM2 with Peripheral Neuropathy  - A1C >14  - Continue on home Levemir 10U qhs & SSI w/ POCT glucose. Added aspart 5 units with meals  - glucose in 250s  will increase to 14 levemir nightly for better control     Hyperkalemia  - On admission, K 5.7. EKG with no changes from baseline  - on CVVHD with K 3.9 stable     Hypervolemic Hyperchloremic Hyponatremia  - likely due to volume overload  improved to 137 after fluid off     CAD s/p CABG and Stents  -  no chest pain  continue home Aspirin 81mg daily, Plavix 75mg daily, Atorvastatin 40mg daily  - Cholesterol 59, TG 46, HDL 31, LDL 18  - known well to Dr. Hammond who is following     CVA 2013   - Continue on home Aspirin 81mg daily, Plavix 75mg daily, Atorvastatin 40mg daily  - lipid panel as above    PPX  - SQH    Dispo  - Weaning levophed currently    Shonda Vincent  [facility] Internal Medicine HO-3  [facility] Internal Medicine Service Team B  [phone]    [facility] Medicine Hospitalist Pager numbers:   [facility] Hospitalist Medicine Team A (Krista/Patrizia): [phone]  [facility] Hospitalist Medicine Team B (Ivette/Kathi):  [phone]

## 2017-12-03 NOTE — PLAN OF CARE
Problem: Hemodialysis (Adult)  Goal: Signs and Symptoms of Listed Potential Problems Will be Absent, Minimized or Managed (Hemodialysis)  Signs and symptoms of listed potential problems will be absent, minimized or managed by discharge/transition of care (reference Hemodialysis (Adult) CPG).   Outcome: Ongoing (interventions implemented as appropriate)   12/03/17 1306   Hemodialysis   Problems Assessed (Hemodialysis) all   Problems Present (Hemodialysis) electrolyte imbalance;fluid imbalance   NxStage ongoing per MD orders.

## 2017-12-03 NOTE — PROGRESS NOTES
Received bedside report from off going nurse, pt lying in bed with CRRT treatment in progress. Right Subclavian Triaylsis  Patent. Patient explained POC verbalized understanding. Yet continue to ask for pain medication. I informed him that it was given around 1700.   1930 Patient is yelling out repeatedly reassurance is given and pt informed on pain medication dosage.  2030 Trazodone given as ordered.  2115 Patient back yelling out again MD called and a one time dose of hydrocodone given 10mg.   2210 Patient resting finally  0310 Complete bed bath given with all linen change. Patient was very cooperative with log rolling.

## 2017-12-03 NOTE — PROGRESS NOTES
Seen this am      No cardiac complaints or issues overnight        He is currently on CRRT with a trivial dose of Levophed      Vitals:    12/03/17 0630 12/03/17 0645 12/03/17 0738 12/03/17 0844   BP: 108/66 119/74     Pulse: 97 97 96    Resp: 12 10 (!) 9    Temp:    97.4 °F (36.3 °C)   TempSrc:    Oral   SpO2: 100% 100% 100%    Weight:       Height:             LABS  CBC    Recent Labs  Lab 12/01/17  0029 12/01/17  0409 12/01/17  1337 12/02/17  0309   WBC 6.62 7.08  --  11.46   RBC 3.78* 3.94*  --  4.23*   HGB 9.6* 9.9*  --  10.7*   HCT 28.2* 29.4* 34* 31.2*   * 147*  --  156   MCV 75* 75*  --  74*   MCH 25.4* 25.1*  --  25.3*   MCHC 34.0 33.7  --  34.3     BMP    Recent Labs  Lab 12/02/17  1330 12/02/17  2246 12/03/17  0510   * 135* 137   K 4.8 4.4 4.4   CO2 18* 24 24    100 102   BUN 41* 36* 32*   CREATININE 4.6* 4.3* 4.1*   * 257* 247*       POCT-Glucose  POCT Glucose   Date Value Ref Range Status   12/02/2017 219 (H) 70 - 110 mg/dL Final   12/02/2017 217 (H) 70 - 110 mg/dL Final   12/02/2017 180 (H) 70 - 110 mg/dL Final   12/01/2017 184 (H) 70 - 110 mg/dL Final   12/01/2017 149 (H) 70 - 110 mg/dL Final   12/01/2017 99 70 - 110 mg/dL Final   12/01/2017 142 (H) 70 - 110 mg/dL Final   11/30/2017 316 (H) 70 - 110 mg/dL Final   11/30/2017 199 (H) 70 - 110 mg/dL Final   11/30/2017 185 (H) 70 - 110 mg/dL Final         Recent Labs  Lab 12/02/17  0600 12/02/17  1330 12/02/17  2246 12/03/17  0510   CALCIUM 7.7* 7.8* 7.8* 7.8*   MG 2.2 2.1 2.2  --    PHOS 4.5 4.4 3.9 3.8     LFT    Recent Labs  Lab 12/01/17  0409 12/01/17  1559  12/02/17  0600 12/02/17  1330 12/02/17  2246 12/03/17  0510   PROT 5.3* 5.1*  --  6.0  --   --   --    ALBUMIN 2.5* 2.4*  2.4*  < > 2.8* 2.7* 2.7* 2.6*   BILITOT 0.6 0.6  --  0.7  --   --   --    AST 13 21  --  24  --   --   --    ALKPHOS 134 147*  --  166*  --   --   --    ALT 6* <5*  --  <5*  --   --   --    < > = values in this interval not displayed.  GFR      COAGS  No results for input(s): INR, APTT in the last 168 hours.    Invalid input(s): PT  CE    Recent Labs  Lab 11/27/17  1235 11/28/17 2037   TROPONINI 0.439* 0.521*       LAST HbA1c  Lab Results   Component Value Date    HGBA1C >14.0 (H) 12/01/2017       Lipid panel  Lab Results   Component Value Date    CHOL 59 (L) 12/01/2017    CHOL 79 (L) 03/07/2017    CHOL 79 (L) 08/31/2015     Lab Results   Component Value Date    HDL 31 (L) 12/01/2017    HDL 41 03/07/2017    HDL 30 (L) 08/31/2015     Lab Results   Component Value Date    LDLCALC 18.8 (L) 12/01/2017    LDLCALC 28.6 (L) 03/07/2017    LDLCALC 35.8 (L) 08/31/2015     Lab Results   Component Value Date    TRIG 46 12/01/2017    TRIG 47 03/07/2017    TRIG 66 08/31/2015     Lab Results   Component Value Date    CHOLHDL 52.5 (H) 12/01/2017    CHOLHDL 51.9 (H) 03/07/2017    CHOLHDL 38.0 08/31/2015            Continue with current plan              CRRT              Low dose levophed while maintaining MAP > 65 wean as tolerated              Aspirin              Statin as tolerated              Plavix                    Hold HTN agents for now and resume after levophed is off           Replace electrolytes  No ICD-not a candidate  DVT/GI prophylaxis  Code status  Medical therapy for CAD and valvular disease      Long term prognosis poor

## 2017-12-03 NOTE — PROGRESS NOTES
Report given to oncoming nurseAmeena RN. All therapy bag changed once more. Patient is awake and alert and cooperative.

## 2017-12-04 NOTE — PROGRESS NOTES
"Vancomycin Dosing and Monitoring Pharmacy Protocol    Williams Bland is a 67 y.o. male    Height: 5' 11" (1.803 m)   Wt Readings from Last 1 Encounters:   12/04/17 77.5 kg (170 lb 13.7 oz)     Ideal body weight: 75.3 kg (166 lb 0.1 oz)  Adjusted ideal body weight: 76.2 kg (167 lb 15.1 oz)    Temp Readings from Last 3 Encounters:   12/04/17 97.4 °F (36.3 °C) (Axillary)   11/29/17 98.8 °F (37.1 °C)   11/28/17 96.3 °F (35.7 °C) (Oral)      Lab Results   Component Value Date/Time    WBC 8.20 12/04/2017 05:25 AM    WBC 6.47 12/03/2017 12:42 PM    WBC 11.46 12/02/2017 03:09 AM      Lab Results   Component Value Date/Time    CREATININE 2.0 (H) 12/04/2017 01:03 PM    CREATININE 2.3 (H) 12/04/2017 05:25 AM    CREATININE 2.9 (H) 12/03/2017 09:20 PM        Serum creatinine: 2 mg/dL High 12/04/17 1303  Estimated creatinine clearance: 39.2 mL/min    Antibiotics       Start     Stop Route Frequency Ordered    12/04/17 1600  vancomycin (VANCOCIN) 1,250 mg in dextrose 5 % 250 mL IVPB      -- IV Every 24 hours (non-standard times) 12/04/17 1456          Antifungals       None            Microbiology Results (last 7 days)       Procedure Component Value Units Date/Time    Blood culture [[id number]] Collected:  12/02/17 1351    Order Status:  Completed Specimen:  Blood from Peripheral, Hand, Right Updated:  12/03/17 2212     Blood Culture, Routine No Growth to date     Blood Culture, Routine No Growth to date    Blood culture [[id number]] Collected:  12/02/17 1351    Order Status:  Completed Specimen:  Blood from Peripheral, Hand, Right Updated:  12/03/17 2212     Blood Culture, Routine No Growth to date     Blood Culture, Routine No Growth to date    Clostridium difficile EIA [[id number]] Collected:  12/01/17 1539    Order Status:  Completed Specimen:  Stool from Stool Updated:  12/01/17 2032     C. diff Antigen Negative     C difficile Toxins A+B, EIA Negative     Comment: Testing not recommended for children <24 months old.         "       Indication:   Bacteremia    Target Level: 15-20 mcg/ml    Hemodialysis:   Yes on CRRT    Dosing Weight:   ABW--actual body weight  If ABW is greater than or equal to 30% over Ideal Body Weight, AdjBW will be used to calculate vancomycin dose.    Last Vancomycin dose: 1000 mg   Date/Time given: 12/3 1739          Vancomycin level:  No results for input(s): VANCOMYCIN-TROUGH in the last 72 hours.  Recent Labs   Lab Result Units  17   0525   Vancomycin, Random ug/mL  11.2       Per Protocol Initial/Adjustments Dosin. Initial/Adjustment Dose: Patient has received vancomycin 1000mg IV for past two days. Plan to slightly increase vancomycin to 1250mg IV q24h. Patient is currently on CVVHD.    2. Vancomycin Random Level will be drawn on  1500date/time. Plan to closely monitor vancomycin level.    Pharmacy will continue to follow.    Please contact if you have any further questions. Thank you.    Tiburcio Conley, PharmD  [phone]

## 2017-12-04 NOTE — CONSULTS
[facility]  Cardiology  Consult Note    Patient Name: Williams Bland  MRN: [id number]  Admission Date: 11/29/2017  Hospital Length of Stay: 4 days  Code Status: Prior   Attending Provider: Grabiel Armenta MD   Consulting Provider: NITESH Shafer ANP  Primary Care Physician: Ronan Caldwell MD  Principal Problem:Acute on chronic systolic heart failure        Inpatient consult to [facility]  Consult performed by: TRACE HALEY  Consult ordered by: DALILA MACHUCA      See full consult noted dated 12/2/2017 under progress note section

## 2017-12-04 NOTE — SUBJECTIVE & OBJECTIVE
Past Medical History:   Diagnosis Date    CHF (congestive heart failure)     Coronary artery disease     CVA (cerebral vascular accident) 2013    Diabetes mellitus     ESRD (end stage renal disease)     Hypertension     Stroke        Past Surgical History:   Procedure Laterality Date    CARDIAC SURGERY      PTCA WITH STENT PLACEMENT    THYROID SURGERY         Review of patient's allergies indicates:  No Known Allergies    No current facility-administered medications on file prior to encounter.      Current Outpatient Prescriptions on File Prior to Encounter   Medication Sig    allopurinol (ZYLOPRIM) 100 MG tablet Take 100 mg by mouth every morning.     amlodipine (NORVASC) 10 MG tablet Take 1 tablet (10 mg total) by mouth once daily.    aspirin (ECOTRIN) 81 MG EC tablet Take 1 tablet (81 mg total) by mouth once daily.    atorvastatin (LIPITOR) 40 MG tablet Take 1 tablet (40 mg total) by mouth once daily.    blood sugar diagnostic (CONTOUR TEST STRIPS) Strp USE TO CHECK BLOOD SUGAR THREE TIMES A DAY.    carvedilol (COREG) 3.125 MG tablet Take 1 tablet (3.125 mg total) by mouth 2 (two) times daily with meals.    clopidogrel (PLAVIX) 75 mg tablet Take 1 tablet (75 mg total) by mouth once daily.    clopidogrel (PLAVIX) 75 mg tablet Take 1 tablet (75 mg total) by mouth once daily.    furosemide (LASIX) 80 MG tablet Take 1 tablet (80 mg total) by mouth once daily.    hydrocodone-acetaminophen 5-325mg (NORCO) 5-325 mg per tablet Take 1 tablet by mouth every 6 (six) hours as needed for Pain.    insulin aspart (NOVOLOG) 100 unit/mL InPn pen Inject 5 Units into the skin 3 (three) times daily with meals.    insulin detemir (LEVEMIR FLEXTOUCH) 100 unit/mL (3 mL) SubQ InPn pen Inject 10 Units into the skin every evening.    isosorbide mononitrate (IMDUR) 60 MG 24 hr tablet Take 1 tablet (60 mg total) by mouth once daily.    levothyroxine (SYNTHROID) 125 MCG tablet Take 125 mcg by mouth once daily.     levothyroxine (SYNTHROID) 125 MCG tablet Take 1 tablet (125 mcg total) by mouth before breakfast.    lisinopril (PRINIVIL,ZESTRIL) 20 MG tablet Take 1 tablet (20 mg total) by mouth once daily.    lubiprostone (AMITIZA) 8 MCG Cap Take 8 mcg by mouth 2 (two) times daily.    miconazole (MICOTIN) 2 % cream Apply topically 2 (two) times daily.    oxycodone-acetaminophen (PERCOCET)  mg per tablet Take 1 tablet by mouth every 6 (six) hours as needed for Pain.    pantoprazole (PROTONIX) 40 MG tablet Take 1 tablet by mouth daily.    pregabalin (LYRICA) 75 MG capsule Take 75 mg by mouth 2 (two) times daily. Take before and after dialysis    protein (ENSURE HIGH PROTEIN) Powd Take by mouth 3 (three) times daily. 3 teaspoons PO TID    sevelamer carbonate (RENVELA) 800 mg Tab Take 2 tablets (1,600 mg total) by mouth 3 (three) times daily with meals.    silver sulfADIAZINE 1% (SILVADENE) 1 % cream Apply 1 application topically 2 (two) times daily.    trazodone (DESYREL) 100 MG tablet Take 1 tablet (100 mg total) by mouth every evening.     Family History     Problem Relation (Age of Onset)    Hypertension Father        Social History Main Topics    Smoking status: Former Smoker     Quit date: 9/22/2000    Smokeless tobacco: Never Used    Alcohol use No    Drug use: No    Sexual activity: Not Currently     Review of Systems   Constitution: Positive for weight loss. Negative for chills, decreased appetite, diaphoresis, fever and weakness.   Cardiovascular: Positive for dyspnea on exertion and leg swelling. Negative for chest pain, claudication, cyanosis, irregular heartbeat, near-syncope, orthopnea, palpitations, paroxysmal nocturnal dyspnea and syncope.   Respiratory: Positive for shortness of breath. Negative for cough, hemoptysis and wheezing.    Gastrointestinal: Negative for bloating, abdominal pain, constipation, diarrhea, melena, nausea and vomiting.   Neurological: Negative for dizziness.     Objective:      Vital Signs (Most Recent):  Temp: 97.5 °F (36.4 °C) (12/04/17 0704)  Pulse: 75 (12/04/17 0615)  Resp: (!) 8 (12/04/17 0615)  BP: (!) 105/55 (12/04/17 0615)  SpO2: 100 % (12/04/17 0615) Vital Signs (24h Range):  Temp:  [97.5 °F (36.4 °C)-98.6 °F (37 °C)] 97.5 °F (36.4 °C)  Pulse:  [] 75  Resp:  [5-29] 8  SpO2:  [50 %-100 %] 100 %  BP: ()/(46-81) 105/55     Weight: 77.5 kg (170 lb 13.7 oz)  Body mass index is 23.83 kg/m².    SpO2: 100 %  O2 Device (Oxygen Therapy): room air      Intake/Output Summary (Last 24 hours) at 12/04/17 1002  Last data filed at 12/04/17 0900   Gross per 24 hour   Intake            344.8 ml   Output             3456 ml   Net          -3111.2 ml       Lines/Drains/Airways     Central Venous Catheter Line                 Hemodialysis Catheter 12/01/17 1342 right subclavian 2 days          Drain                 Hemodialysis AV Fistula Left upper arm -- days         Hemodialysis AV Fistula Left upper arm -- days                Physical Exam   Constitutional: He is oriented to person, place, and time. He appears well-developed. He has a sickly appearance. No distress.   Cardiovascular: Normal rate and regular rhythm.  Exam reveals no gallop.    No murmur heard.  Pulmonary/Chest: Effort normal. No respiratory distress. He has decreased breath sounds. He has no wheezes.   Abdominal: Soft. Bowel sounds are normal. He exhibits no distension. There is no tenderness.   Neurological: He is alert and oriented to person, place, and time.   Skin: Skin is warm and dry.       Significant Labs:       Recent Labs  Lab 12/04/17  0525   CALCIUM 7.7*      K 4.2   CO2 26      BUN 14   CREATININE 2.3*       Recent Labs  Lab 12/04/17  0525   WBC 8.20   RBC 3.55*   HGB 8.8*   HCT 27.7*   PLT 87*   MCV 78*   MCH 24.8*   MCHC 31.8*       Significant Imaging: Echocardiogram:   2D echo with color flow doppler:   Results for orders placed or performed during the hospital encounter of 09/14/17   2D  echo with color flow doppler   Result Value Ref Range    EF 20 (A) 55 - 65    Est. PA Systolic Pressure 15.96     Tricuspid Valve Regurgitation MILD

## 2017-12-04 NOTE — PROGRESS NOTES
Progress Note  Nephrology      Consult Requested By: Grabiel Armenta MD      SUBJECTIVE:     Overnight events  Patient is a 67 y.o. male     Patient Active Problem List   Diagnosis    Mitral regurgitation    Coronary artery disease    Cerebrovascular disease    Elevated troponin    Uncontrolled type 2 diabetes mellitus with chronic kidney disease on chronic dialysis, with long-term current use of insulin    Hyperlipidemia LDL goal <70    Hypothyroid    Anemia    Mitral valve replaced    Coronary artery disease involving coronary bypass graft of native heart without angina pectoris    Volume overload    Serum potassium elevated    ESRD on hemodialysis    Essential hypertension    NSTEMI (non-ST elevated myocardial infarction)    Personal history of noncompliance with medical treatment, presenting hazards to health    Hyperglycemia    Fluid overload    Thrombocytopenia    Hypertensive emergency    Acute on chronic systolic heart failure    Pulmonary hypertension    Weakness    Physical deconditioning    Swelling    Hypothyroidism due to acquired atrophy of thyroid    Below knee amputation status, right    Clotted dialysis access     Past Medical History:   Diagnosis Date    CHF (congestive heart failure)     Coronary artery disease     CVA (cerebral vascular accident) 2013    Diabetes mellitus     ESRD (end stage renal disease)     Hypertension     Stroke               OBJECTIVE:     Vitals:    12/04/17 0545 12/04/17 0600 12/04/17 0615 12/04/17 0704   BP: (!) 102/57 108/66 (!) 105/55    Pulse: 71 93 75    Resp: (!) 7 11 (!) 8    Temp:    97.5 °F (36.4 °C)   TempSrc:    Oral   SpO2: 98% 100% 100%    Weight:       Height:           Temp: 97.5 °F (36.4 °C) (12/04/17 0704)  Pulse: 75 (12/04/17 0615)  Resp: (!) 8 (12/04/17 0615)  BP: (!) 105/55 (12/04/17 0615)  SpO2: 100 % (12/04/17 0615)      Date 12/04/17 0700 - 12/05/17 0659   Shift 3260-9612 0912-5310 5125-1322 24 Hour Total    I  N  T  A  K  E   Shift Total  (mL/kg)       O  U  T  P  U  T   Other 440   440    Shift Total  (mL/kg) 440  (5.7)   440  (5.7)   Weight (kg) 77.5 77.5 77.5 77.5             Medications:   sodium chloride 0.9%   Intravenous Once    sodium chloride 0.9%   Intravenous Once    atorvastatin  40 mg Oral Daily    heparin (porcine)  5,000 Units Subcutaneous Q12H    insulin aspart  5 Units Subcutaneous TID WM    insulin detemir  14 Units Subcutaneous QHS    levothyroxine  125 mcg Oral Before breakfast    lubiprostone  8 mcg Oral BID    pantoprazole  40 mg Oral Daily    pregabalin  75 mg Oral BID      norepinephrine bitartrate-D5W 0.05 mcg/kg/min (12/04/17 0430)               Physical Exam:  General appearance: NAD  No SOB  Lungs:  Diminished breath sounds  Heart: Pulse 75  Abdomen: soft  Extremities:  No edema  Skin: dry  Laboratory:  ABG  Labs reviewed  Recent Results (from the past 336 hour(s))   Basic metabolic panel    Collection Time: 11/27/17  5:09 PM   Result Value Ref Range    Sodium 123 (L) 136 - 145 mmol/L    Potassium 4.3 3.5 - 5.1 mmol/L    Chloride 89 (L) 95 - 110 mmol/L    CO2 19 (L) 23 - 29 mmol/L    BUN, Bld 61 (H) 8 - 23 mg/dL    Creatinine 6.5 (H) 0.5 - 1.4 mg/dL    Calcium 8.1 (L) 8.7 - 10.5 mg/dL    Anion Gap 15 8 - 16 mmol/L     Recent Results (from the past 336 hour(s))   CBC auto differential    Collection Time: 12/04/17  5:25 AM   Result Value Ref Range    WBC 8.20 3.90 - 12.70 K/uL    Hemoglobin 8.8 (L) 14.0 - 18.0 g/dL    Hematocrit 27.7 (L) 40.0 - 54.0 %    Platelets 87 (L) 150 - 350 K/uL   CBC auto differential    Collection Time: 12/03/17 12:42 PM   Result Value Ref Range    WBC 6.47 3.90 - 12.70 K/uL    Hemoglobin 8.8 (L) 14.0 - 18.0 g/dL    Hematocrit 26.7 (L) 40.0 - 54.0 %    Platelets 80 (L) 150 - 350 K/uL   CBC auto differential    Collection Time: 12/02/17  3:09 AM   Result Value Ref Range    WBC 11.46 3.90 - 12.70 K/uL    Hemoglobin 10.7 (L) 14.0 - 18.0 g/dL    Hematocrit  31.2 (L) 40.0 - 54.0 %    Platelets 156 150 - 350 K/uL     Urinalysis  No results for input(s): COLORU, CLARITYU, SPECGRAV, PHUR, PROTEINUA, GLUCOSEU, BILIRUBINCON, BLOODU, WBCU, RBCU, BACTERIA, MUCUS, NITRITE, LEUKOCYTESUR, UROBILINOGEN, HYALINECASTS in the last 24 hours.    Diagnostic Results:  X-Ray: Reviewed  US: Reviewed  Echo: Reviewed  ACCESS    ASSESSMENT/PLAN:     ESRD.  MBD.   Anemia.   Hb 8.8.  Poor nutrition.  Albumin 2.9.   /66 on norepinephrine.  Weight 77.5 kg.  Fluid balance - 6,456  cc since admit.  CXR 12/1-   Increasing right-sided pleural effusion and airspace opacity in the right lung base.    Echo 9/15/17     Severely depressed left ventricular systolic function (EF 20-25%).       Normal Mitral valve bioprosthesis.   CVVHD, net UF 70 cc/h   I and O  Weight daily  Repeat CXR

## 2017-12-04 NOTE — ASSESSMENT & PLAN NOTE
-off antihypertensives due to hypotension and pressor use  -will continue to hold to allow for adequate volume removal  -anticipate possible complete discontinuation upon discharge vs low dose BB only

## 2017-12-04 NOTE — CONSULTS
Palliative Care Acknowledgement of Consult - 12/4/2017 3:53     Consult received.  Full consult to follow.     Thank you for allowing us to be a part of the care of this patient    Lakeshia Michael MD, MPH    Palliative Care Team   [phone]

## 2017-12-04 NOTE — PLAN OF CARE
"1900: Pt keep asking for the pain medication which is due at 1951. Reassurance provided but pt needs frequent reassurance. Informed that pain meds will be given at its due time which is every four hours.  1951: PRN pain meds administered.  2030: Pt resting well.   0100: PRN pain meds given as per order.   0130: Pt still complaining about pain meds not helping.  0149: Informed Dr. Cueva that pt spoke with someone (wife or friend) on the phone and told them to bring his "white pill" because he is not getting the pain meds in the hospital. Pt is receiving PRN pain medication every four hours and keeps track of the medication timing to make sure he receive it. No new orders given. Will continue to monitor.  0230: Pt resting well.   "

## 2017-12-04 NOTE — PROGRESS NOTES
[facility]  Cardiology  Progress Note    Patient Name: Williams Bland  MRN: [id number]  Admission Date: 11/29/2017  Hospital Length of Stay: 4 days  Code Status: Prior   Attending Physician: Grabiel Armenta MD   Primary Care Physician: Ronan Caldwell MD  Expected Discharge Date:   Principal Problem:Acute on chronic systolic heart failure    Subjective:     Hospital Course:   12/2/2017-12/3/2017 Presented to the ER from HD due to issues with dialysis access. Taken for successful thrombecetomy. Cardiology consulted due concern for acute decompensated HF. Concerning for advanced CHF.Na 128 PH 7.2; CXR enlarged cardiac silhouette with increase vascular markings; elevated total bili, Alb 2.8; H/H 10.7/31; HgA1c >14; Lactic acid 1.7. Symptoms concerning for advanced HF with Weight loss, Multi-organ failure, Multiple admission, Hypotensive and life exptancy-guarded to poor. Currently not an candidate for ICD-short life exptancy. Placed on low dose Levophed due to hypotension along with CRRT for volume removal  12/4/2017 Remains on low dose Levophed with CRRT in process. 3.4 liters removed overnight and negative 6.0 liters since admission. BP stable on Levophed drip. No arrhythmias noted on monitor. Patient reports SOB and LE edema improved         Past Medical History:   Diagnosis Date    CHF (congestive heart failure)     Coronary artery disease     CVA (cerebral vascular accident) 2013    Diabetes mellitus     ESRD (end stage renal disease)     Hypertension     Stroke        Past Surgical History:   Procedure Laterality Date    CARDIAC SURGERY      PTCA WITH STENT PLACEMENT    THYROID SURGERY         Review of patient's allergies indicates:  No Known Allergies    No current facility-administered medications on file prior to encounter.      Current Outpatient Prescriptions on File Prior to Encounter   Medication Sig    allopurinol (ZYLOPRIM) 100 MG tablet Take 100 mg by mouth every morning.      amlodipine (NORVASC) 10 MG tablet Take 1 tablet (10 mg total) by mouth once daily.    aspirin (ECOTRIN) 81 MG EC tablet Take 1 tablet (81 mg total) by mouth once daily.    atorvastatin (LIPITOR) 40 MG tablet Take 1 tablet (40 mg total) by mouth once daily.    blood sugar diagnostic (CONTOUR TEST STRIPS) Strp USE TO CHECK BLOOD SUGAR THREE TIMES A DAY.    carvedilol (COREG) 3.125 MG tablet Take 1 tablet (3.125 mg total) by mouth 2 (two) times daily with meals.    clopidogrel (PLAVIX) 75 mg tablet Take 1 tablet (75 mg total) by mouth once daily.    clopidogrel (PLAVIX) 75 mg tablet Take 1 tablet (75 mg total) by mouth once daily.    furosemide (LASIX) 80 MG tablet Take 1 tablet (80 mg total) by mouth once daily.    hydrocodone-acetaminophen 5-325mg (NORCO) 5-325 mg per tablet Take 1 tablet by mouth every 6 (six) hours as needed for Pain.    insulin aspart (NOVOLOG) 100 unit/mL InPn pen Inject 5 Units into the skin 3 (three) times daily with meals.    insulin detemir (LEVEMIR FLEXTOUCH) 100 unit/mL (3 mL) SubQ InPn pen Inject 10 Units into the skin every evening.    isosorbide mononitrate (IMDUR) 60 MG 24 hr tablet Take 1 tablet (60 mg total) by mouth once daily.    levothyroxine (SYNTHROID) 125 MCG tablet Take 125 mcg by mouth once daily.    levothyroxine (SYNTHROID) 125 MCG tablet Take 1 tablet (125 mcg total) by mouth before breakfast.    lisinopril (PRINIVIL,ZESTRIL) 20 MG tablet Take 1 tablet (20 mg total) by mouth once daily.    lubiprostone (AMITIZA) 8 MCG Cap Take 8 mcg by mouth 2 (two) times daily.    miconazole (MICOTIN) 2 % cream Apply topically 2 (two) times daily.    oxycodone-acetaminophen (PERCOCET)  mg per tablet Take 1 tablet by mouth every 6 (six) hours as needed for Pain.    pantoprazole (PROTONIX) 40 MG tablet Take 1 tablet by mouth daily.    pregabalin (LYRICA) 75 MG capsule Take 75 mg by mouth 2 (two) times daily. Take before and after dialysis    protein (ENSURE  HIGH PROTEIN) Powd Take by mouth 3 (three) times daily. 3 teaspoons PO TID    sevelamer carbonate (RENVELA) 800 mg Tab Take 2 tablets (1,600 mg total) by mouth 3 (three) times daily with meals.    silver sulfADIAZINE 1% (SILVADENE) 1 % cream Apply 1 application topically 2 (two) times daily.    trazodone (DESYREL) 100 MG tablet Take 1 tablet (100 mg total) by mouth every evening.     Family History     Problem Relation (Age of Onset)    Hypertension Father        Social History Main Topics    Smoking status: Former Smoker     Quit date: 9/22/2000    Smokeless tobacco: Never Used    Alcohol use No    Drug use: No    Sexual activity: Not Currently     Review of Systems   Constitution: Positive for weight loss. Negative for chills, decreased appetite, diaphoresis, fever and weakness.   Cardiovascular: Positive for dyspnea on exertion and leg swelling. Negative for chest pain, claudication, cyanosis, irregular heartbeat, near-syncope, orthopnea, palpitations, paroxysmal nocturnal dyspnea and syncope.   Respiratory: Positive for shortness of breath. Negative for cough, hemoptysis and wheezing.    Gastrointestinal: Negative for bloating, abdominal pain, constipation, diarrhea, melena, nausea and vomiting.   Neurological: Negative for dizziness.     Objective:     Vital Signs (Most Recent):  Temp: 97.5 °F (36.4 °C) (12/04/17 0704)  Pulse: 75 (12/04/17 0615)  Resp: (!) 8 (12/04/17 0615)  BP: (!) 105/55 (12/04/17 0615)  SpO2: 100 % (12/04/17 0615) Vital Signs (24h Range):  Temp:  [97.5 °F (36.4 °C)-98.6 °F (37 °C)] 97.5 °F (36.4 °C)  Pulse:  [] 75  Resp:  [5-29] 8  SpO2:  [50 %-100 %] 100 %  BP: ()/(46-81) 105/55     Weight: 77.5 kg (170 lb 13.7 oz)  Body mass index is 23.83 kg/m².    SpO2: 100 %  O2 Device (Oxygen Therapy): room air      Intake/Output Summary (Last 24 hours) at 12/04/17 1002  Last data filed at 12/04/17 0900   Gross per 24 hour   Intake            344.8 ml   Output             3456 ml    Net          -3111.2 ml       Lines/Drains/Airways     Central Venous Catheter Line                 Hemodialysis Catheter 12/01/17 1342 right subclavian 2 days          Drain                 Hemodialysis AV Fistula Left upper arm -- days         Hemodialysis AV Fistula Left upper arm -- days                Physical Exam   Constitutional: He is oriented to person, place, and time. He appears well-developed. He has a sickly appearance. No distress.   Cardiovascular: Normal rate and regular rhythm.  Exam reveals no gallop.    No murmur heard.  Pulmonary/Chest: Effort normal. No respiratory distress. He has decreased breath sounds. He has no wheezes.   Abdominal: Soft. Bowel sounds are normal. He exhibits no distension. There is no tenderness.   Neurological: He is alert and oriented to person, place, and time.   Skin: Skin is warm and dry.       Significant Labs:       Recent Labs  Lab 12/04/17  0525   CALCIUM 7.7*      K 4.2   CO2 26      BUN 14   CREATININE 2.3*       Recent Labs  Lab 12/04/17  0525   WBC 8.20   RBC 3.55*   HGB 8.8*   HCT 27.7*   PLT 87*   MCV 78*   MCH 24.8*   MCHC 31.8*       Significant Imaging: Echocardiogram:   2D echo with color flow doppler:   Results for orders placed or performed during the hospital encounter of 09/14/17   2D echo with color flow doppler   Result Value Ref Range    EF 20 (A) 55 - 65    Est. PA Systolic Pressure 15.96     Tricuspid Valve Regurgitation MILD      Assessment and Plan:     Brief HPI: Seen this morning on AM NP rounds while resting in bed with CRRT in process. Reports SOB and LE swelling improved since admission. Discussed cardiac POC with patient to include continued gentle removal of fluid with close monitoring and no plans for invasive procedures-patient verbalized understanding and agrees with POC     * Acute on chronic systolic heart failure    -presented with SOB and LE edema  -unable to remove fluid with HD due to HD access issues  -presenting  symptoms concerning for advanced CH with weight loss, multi-organ failure, multiple admission and hypotension; feel overally life exptancy-guarded to poor  -recent echo with LVEF 20%  -not an candidate for ICD currently; no ACEI/ARB or BB due to hypotension               Pulmonary hypertension    -last echo with PA pressure 18mmHg down from 80s 9 months ago  -echo with severely depressed LV function and mild RV dysfunction with severe LAE  -echo findings consistent with biventricular failure; recommend conservative medical management with volume removal and feel overall long term prognosis is poor        Essential hypertension    -off antihypertensives due to hypotension and pressor use  -will continue to hold to allow for adequate volume removal  -anticipate possible complete discontinuation upon discharge vs low dose BB only         Coronary artery disease involving coronary bypass graft of native heart without angina pectoris    -previous [facility] with RCA PCI, 70% LCX, and patent LAD  -EKG with no acute changes  -complaints of SOB related to ADHF due to inability to remove fluid with HD  -on statin therapy only; recommend ASA if not contraindicated; no ACEI/ARB or BB due hypotension and pressor use  -recommend medical management with no plans for invasive procedure         Mitral regurgitation    -s/p MV replacement-bioprosthesis  -well seated on last echo in 9/2017              VTE Risk Mitigation         Ordered     heparin (porcine) injection 5,000 Units  Every 12 hours     Route:  Subcutaneous        12/03/17 1315     Medium Risk of VTE  Once      12/01/17 1459     Place JENNIFER hose  Until discontinued      12/01/17 1459     Place sequential compression device  Until discontinued      12/01/17 1459          NITESH Shafer, ANP  Cardiology  [facility]

## 2017-12-04 NOTE — PLAN OF CARE
Patient on Levo gtt and CRRT, will continue to follow for DC needs.     12/04/17 1550   Discharge Reassessment   Assessment Type Discharge Planning Reassessment   Discharge Plan A Home;Home with family   Discharge Plan B Home with family;Home Health

## 2017-12-04 NOTE — ASSESSMENT & PLAN NOTE
-previous [facility] with RCA PCI, 70% LCX, and patent LAD  -EKG with no acute changes  -complaints of SOB related to ADHF due to inability to remove fluid with HD  -on statin therapy only; recommend ASA if not contraindicated; no ACEI/ARB or BB due hypotension and pressor use  -recommend medical management with no plans for invasive procedure

## 2017-12-04 NOTE — PROGRESS NOTES
"[facility] Resident HO-3 Progress Note    Subjective:      Mr. Bland today is acting strangely. He is leaning to the right side and when I started talking to him, he started crying and saying he doesn't know what he did to upset me. I tried to remind him of who I am and re-orient him. He oriented to self, place, and situation. But then he says, "Ma'am I just want to go home."     He is still on CVVHD and levophed at the lowest dose.      Objective:   Last 24 Hour Vital Signs:  BP  Min: 75/50  Max: 133/69  Temp  Av.9 °F (36.6 °C)  Min: 97.4 °F (36.3 °C)  Max: 98.6 °F (37 °C)  Pulse  Av.4  Min: 67  Max: 124  Resp  Av.5  Min: 5  Max: 29  SpO2  Av.8 %  Min: 50 %  Max: 100 %  Weight  Av.5 kg (170 lb 13.7 oz)  Min: 77.5 kg (170 lb 13.7 oz)  Max: 77.5 kg (170 lb 13.7 oz)  I/O last 3 completed shifts:  In: 1321.2 [P.O.:1000; I.V.:71.2; IV Piggyback:250]  Out: 5280 [Other:5280]    Physical Examination:  Gen: Thin, leaning to the right side, emotionally labile and tearful, NAD  HEENT: EOMI, no scleral icterus, Clear conjunctiva  Cv: RRR. Normal S1 and S2, right chest with access and dialysis ongoing  Lungs: CTAB, respirations even and non labored. No rales, crackles, rhonchi, or wheezing  Gi: thin, soft, non tender to palpation. Normoactive bowel sounds  Extrem: warm, with edema of BLE. Right BKA.  Left upper arm sutures clean and without erythema.    Skin: dry, warm, no rashes or breakdown  Neuro: AAOx3, cooperative. No focal deficits.     Laboratory:  WBC 8  Hg 8.8  Plt 87    Na 138  K 4.2  BUN 14  Cr 2.3  Gluc 247  Alb 2.9  Phos 3.1    Random vanc 11.2    Current Medications:     Infusions:   norepinephrine bitartrate-D5W 0.05 mcg/kg/min (12/04/17 5570)        Scheduled:   sodium chloride 0.9%   Intravenous Once    sodium chloride 0.9%   Intravenous Once    atorvastatin  40 mg Oral Daily    heparin (porcine)  5,000 Units Subcutaneous Q12H    insulin aspart  5 Units Subcutaneous TID " WM    insulin detemir  14 Units Subcutaneous QHS    levothyroxine  125 mcg Oral Before breakfast    lubiprostone  8 mcg Oral BID    pantoprazole  40 mg Oral Daily    pregabalin  75 mg Oral BID        PRN:  sodium chloride 0.9%, acetaminophen, dextrose 50%, dextrose 50%, glucagon (human recombinant), glucose, glucose, hydrocodone-acetaminophen 10-325mg, hydrocodone-acetaminophen 5-325mg, insulin aspart, sodium phosphate IVPB    Assessment:     Williams Bland is a 67 y.o.male with  Patient Active Problem List    Diagnosis Date Noted    Clotted dialysis access 11/29/2017    Below knee amputation status, right 11/22/2017    Hypothyroidism due to acquired atrophy of thyroid 09/17/2017    Swelling 09/14/2017    Physical deconditioning 07/18/2017    Weakness 07/17/2017    Hyperglycemia 04/19/2017    Fluid overload 04/19/2017    Thrombocytopenia 04/19/2017    Hypertensive emergency 04/19/2017    Acute on chronic systolic heart failure 04/19/2017    Pulmonary hypertension 04/19/2017    NSTEMI (non-ST elevated myocardial infarction) 03/07/2017    Personal history of noncompliance with medical treatment, presenting hazards to health 03/07/2017    Serum potassium elevated 03/11/2016    ESRD on hemodialysis     Essential hypertension     Coronary artery disease involving coronary bypass graft of native heart without angina pectoris 08/29/2015    Volume overload 08/29/2015    Mitral valve replaced 03/26/2015    Anemia 12/08/2014    Hyperlipidemia LDL goal <70 09/18/2014    Hypothyroid 09/18/2014    Uncontrolled type 2 diabetes mellitus with chronic kidney disease on chronic dialysis, with long-term current use of insulin 06/02/2014    Elevated troponin 05/30/2014    Mitral regurgitation 05/08/2014    Coronary artery disease 05/08/2014    Cerebrovascular disease 05/08/2014        Plan:      Cardiogenic Shock due to Volume Overload 2/2 CKD5 and HFrEF  - On admission, patient missed HD 2/2 clotted  fistula  - At home on Coreg, Lisinopril, Imdur, Lasix  - Recommend HD as per Nephrology, currently on CRRT  -Nephro believe his clotting may be due to hypotension during HD (got to 70/32). They have stopped coreg. Patient also not on imdur or lisinopril while currently inpatient.   - on CVVHD with 3.5L off so far and now with improved BP but still on levophed  holding lasix currently  - well known to Dr. Hammond who is consulted  not a candidate for ICD    AVF Malfunction  - Sent from dialysis for malfunctioning dialysis graft and suspected clot  - 11/30  HD Access: Thrombus fills the majority of the venous outflow of LUE  - Repair as per Vascular Surgery (Dr. Ackerman) 11/30 but then clotted off again during HD attempt  - Vascular Surg 12/1 to declot again and place Parish catheter  Became hypotensive during surgery requiring NS bolus and Lovenox > Transferred to ICU for closer monitoring and initiating of CRRT and Levophed for goal MAP >65  - Per Dr. Ackerman, will need revision of fistula after medically stable      HTN  - Known to Dr. Hammond who says his baseline BP is low  on admission, /52  - Holding home amlodipine, coreg, imdur, lisinopril for levophed s/p HoTN in surgery Friday  - wean today as tolerated    Elevated Procalcitonin  - Procalcitonin elevated at 0.72, lactate 1.7  - consider repeating procal  BCx pending  vanc dose x1  random vanc 9 today  - source could be gluteal cleft induration but will look for additional source if conditions worsens  - WBC went from 11 to 7 so will give another single dose of vanc today    Gluteal Cleft Induration  - checked by Dr. Ackerman  no current interventions indicated       DM2 with Peripheral Neuropathy  - A1C >14  - Continue on home Levemir 10U qhs & SSI w/ POCT glucose. Added aspart 5 units with meals  - glucose in 250s  will increase to 14 levemir nightly for better control     Hyperkalemia  - On admission, K 5.7. EKG with no changes from  baseline  - on CVVHD with K 3.9 stable     Hypervolemic Hyperchloremic Hyponatremia  - likely due to volume overload  improved to 137 after fluid off     CAD s/p CABG and Stents  - no chest pain  continue home Aspirin 81mg daily, Plavix 75mg daily, Atorvastatin 40mg daily  - Cholesterol 59, TG 46, HDL 31, LDL 18  - known well to Dr. Hammond who is following     CVA 2013   - Continue on home Aspirin 81mg daily, Plavix 75mg daily, Atorvastatin 40mg daily  - lipid panel as above    PPX  - SQH    Dispo  - Weaning levophed currently     Shonda Vincent  [facility] HO-3  [facility] Service Team B  [phone]    [facility] Medicine Hospitalist Pager numbers:   [facility] Hospitalist Medicine Team A (Krista/Patrizia): [phone]  [facility] Hospitalist Medicine Team B (Ivette/Kathi):  [phone]

## 2017-12-04 NOTE — ASSESSMENT & PLAN NOTE
-presented with SOB and LE edema  -unable to remove fluid with HD due to HD access issues  -presenting symptoms concerning for advanced CH with weight loss, multi-organ failure, multiple admission and hypotension; feel overally life exptancy-guarded to poor  -recent echo with LVEF 20%  -not an candidate for ICD currently; no ACEI/ARB or BB due to hypotension

## 2017-12-05 PROBLEM — Z51.5 PALLIATIVE CARE ENCOUNTER: Status: ACTIVE | Noted: 2017-01-01

## 2017-12-05 PROBLEM — Z71.89 GOALS OF CARE, COUNSELING/DISCUSSION: Status: ACTIVE | Noted: 2017-01-01

## 2017-12-05 NOTE — CONSULTS
Consult Note  Palliative Care    Thank you for opportunity to participate in Mr. Bland's care.     Consult Requested By: Grabiel Armenta MD  Reason for Consult: Goals of care discussion/advance care planning    SUBJECTIVE:     History of Present Illness:  Disease Process: End Stage Renal Disease, Advanced Cardiac Disease    Williams Bland is a 67 y.o.  male with PMH of CHF, CAD, CVA (2013); Diabetes mellitus; ESRD, HTN, and Stroke, presented to the [facility] on 11/29/2017 with one day history of Vascular Access Malfunction,noted when he presented HD and was told that his R arm graft was clotted. Denies fever, chills, chest pain, abdominal pain, N/V, SOB, dizziness, dysuria, hematuria, syncope. He was told to present to the ED for evaluation by vascular surgery. Patient states he missed 2 days of HD total.Upon arrival to the ED, patient was taken to OR where thrombectomy of the graft was performed. Fistula clotted again during HD attempt and was declotted on 12/1 with placement of Parish catheter.  Patient became hypotensive during surgery requiring NS bolus and Lovenox. He was transferred to ICU for closer monitoring and initiating of CRRT and Levophed for goal MAP >65        Cardiology consult:  Patient well known to [facility] admitted with decompensated CHF with a reduced EF complicated with multiorgan failure. Currently on CRRT and low dose levophed to maintain  MAP >65. Patient has 3 vessel CAD s/p RCA PCI, 70% LCX, and patent LAD, ICM with EF 20, BiV failure, bi-atrial enlargement, ESRD on HD MWF, HTN, HLP, PAD, s/p R BKA, venous insufficiency with L leg edema, weight loss due to cardiac cachexia, s/p MVR, DM, hyponatremia, abnormal LFTs, elevated T.Bilirubin from hepatic congestion, BNP >4900 (9/2017), and previous CVA (2013). USS 9/2017 revealed no DVT. CXR enlarged cardiac silhouette with increase vascular markings;  His overall presentation is concerning for severely advanced  "CHF with Weight loss with multi organ failure. Multiple admission, Hypotensive and life exptancy-guarded to poor. Currently not an candidate for ICD-short life exptancy. Placed on low dose Levophed due to hypotension along with CRRT for volume removal. Remains on low dose Levophed with CRRT in process. 3.4 liters removed overnight and negative 6.0 liters since admission. BP stable on Levophed drip. No arrhythmias noted on monitor. Patient reports SOB and LE edema improved      Nephrology consult: well known to nephrology since 10/28 was admitted to [facility] originally for hemoptysis - alveolar hemorrhage and LUBNA - was concern for ANCA vasculitis, was on high dose steroids - kidney bx, bronch and all serologic work up negative for ANCA. Kidney bx shoved stable chronic HTN and diabetic glomerulopathy. 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 After that he had prolonged recovery course with quite a few episodes of cardiac arrest multiple episodes of LUBNA and eventually end up being ESRD. Now on CRRT        Palliative care has been consulted for goals of care discussion/advance care planning        Palliative care met with patient today to establish rapport, provide education about palliative care (communication, coordination of care, symptom management, goals of care) assess patients understanding of current clinical condition and it's relationship to goals of care. Patient seems to lack insight of current clinical status. Presently on Levophed and on CRRT. Patient stated he wants to be discharge and go home He stated his wife Robyn has the MPOA and can speak with her.     Subsequent to the above meeting, called patient's wife, Robyn and she stated " patient is very sick, patient blood pressure dropped since they opened the shunt and now they are pulling fluid out " "of him". Robyn stated she wants everything done to keep the patient alive. She re-affirmed the code status as full code. We discussed possible discharge planning, Robyn noted she will not be able to take care of patient because prior to this admission he has increase frequent falling. Emotional comfort provided      Past Medical History:   Diagnosis Date    CHF (congestive heart failure)     Coronary artery disease     CVA (cerebral vascular accident) 2013    Diabetes mellitus     ESRD (end stage renal disease)     Hypertension     Stroke      Past Surgical History:   Procedure Laterality Date    CARDIAC SURGERY      PTCA WITH STENT PLACEMENT    THYROID SURGERY       Family History   Problem Relation Age of Onset    Hypertension Father      Social History   Substance Use Topics    Smoking status: Former Smoker     Quit date: 9/22/2000    Smokeless tobacco: Never Used    Alcohol use No       Mental Status: Oriented x3    ECOG Performance Status Grade: 3 - Confined to bed or chair 50% of waking hours    Review of Systems:  Constitutional: Negative for chills, diaphoresis, fever and weight loss.   Eyes: Negative for blurred vision and double vision.   Respiratory: Negative for cough and shortness of breath.    Cardiovascular: Positive for leg swelling. Negative for chest pain and orthopnea.   Gastrointestinal: Negative for abdominal pain, heartburn, nausea and vomiting.   Genitourinary: Negative for dysuria, frequency and urgency.   Musculoskeletal: Negative for joint pain and myalgias.   Skin: Negative for itching and rash.   Neurological: Negative for dizziness, tingling, tremors, weakness and headaches.   Endo/Heme/Allergies: Negative for environmental allergies. Does not bruise/bleed easily.   Psychiatric/Behavioral: Negative for depression.       Physical Exam:   Constitutional:  well-developed, not in any distress.   Cardiovascular: Normal rate and regular rhythm, no gallop, or murmur " heard.  Pulmonary:  decreased breath sounds, no wheezes.   Abdominal: Soft, no tenderness, bowel sounds are normal.   Extremities: + edema of BLE. Right BKA.  Left upper arm sutures clean and without erythema.    Neurological: awake and alert, oriented to person, place, and time.   Skin: Skin is warm and dry.     [  OBJECTIVE:     Pain Assessment: No pain reported at this time    Decision-Making Capacity: Family answered questions    Advanced Directives:  Living Will: No  Do Not Resuscitate Status: No  Medical Power of : Yes. Agent's Name: Rashid Agent's Contact Number:     Living Arrangements: Lives with spouse      ASSESSMENT/PLAN:     Recommendations:  Continue medical treatment  Code status: Full code  Palliative care will continue to assist family with goals of care as needed.   Palliative care will continue to provide family with emotional support               Thank you for this consult and the opportunity to participate in Mr. Bland's.        Lakeshia Michael MD, MPH    Palliative Care Team   [phone]     > 50% of 90 min visit spent in chart review, face to face discussion of goals of care with patient, family, symptom assessment, coordination of care and emotional support.

## 2017-12-05 NOTE — PLAN OF CARE
Problem: Patient Care Overview  Goal: Plan of Care Review  Outcome: Ongoing (interventions implemented as appropriate)  Unable to tolerate being off levophed all night. CRRT continued with no issues. Vitals otherwise stable. C/o leg pain. Will continue to monitor.

## 2017-12-05 NOTE — PLAN OF CARE
Update:  TN met with spouce Robyn in room.  Post Acute Choice list reviewed and given to patient, she will call TN with 3 choices for placement.    Patient on CRRT/Levo gtt in ICU, will continue to follow for DC needs. TN left message with spouse Robyn to discuss DC plan.       12/05/17 1220   Discharge Reassessment   Assessment Type Discharge Planning Reassessment   Discharge Plan A Skilled Nursing Facility   Discharge Plan B New Nursing Home placement - [facility]DC care facility

## 2017-12-05 NOTE — PLAN OF CARE
Notified Dr. Dennison of potassium 5.3 and critical lactic acid of 5.54. Also made MD aware that patients BP has been dropping despite changing patient to a Net UF of 0. Received orders to rinse patient back and give him a break from CRRT and to give 500cc bolus of NS. Will implement orders and cont to monitor.

## 2017-12-05 NOTE — NURSING
Pt very agitated and pulling at lines. Requesting oxycontin. Restraints started and haldol given per md order.

## 2017-12-05 NOTE — EICU
Called into bedside electronically via the eICU system, to remote document/assist in code blue proceedings. Mr. Bland was found to be in a rapidly deteriorating condition which consisted of profoundly worsening bradycardia & subsequent hypotension and then PEA. A code blue was called and the ACLS PEA algorithm was initiated. A brief round of chest compressions was initiated after a round of atropine was given with successful ROSC. The code blue event proceeded as follows:     Time Event Details User             17:16 Code Start INITIAL RHYTHM: PEA KS     17:16 Rhythms - ABCs Cardiac Rhythm - Ventricular Rhythm: PEA (pulseless electrical activity)  ABCs - Airway: Clear Breathing: Agonal Pulses: Absent KS     17:16 Condition on Arrival Code Condition on Arrival in ED Code - Pulses Present: Yes Resp Present: BVM Ventilation Mental Status: Unresponsive Cardiac Rhythm: PEA KS     17:16 Info Prior To Code Info Prior To Code - Code Location: Inpatient Rapid Response Team: Responded To Code (ICU STAFF) Witnessed: Yes Event Type: Cardiac Conscious at Onset: No Pulse Present at Onset: No Respirations Present at Onset: Yes Ventilated Pre-Arrest: No Pre-Arrest Vent/Sp02/A-B/ECG: Pulse Ox; ECG KS     17:16 Interventions Interventions - Compressions: Started Airway: Ambu bag Oxygen Percent (% O2): 100 KS     17:16 Staff Arrived Shonda Vincent MD; Dianne Willoughby, ROMERO; Verena Ortega, ROMERO; Daniela Harrington, ROMERO; Martinez Bruce KS     17:17 atropine injection Ordered and Given Dose: 1 mg Route: Intravenous  Ordered by: Shonda Vincent MD      17:17 Interventions Interventions - Compressions: Stopped (for pulse check) Airway: Intubated Oxygen Percent (% O2): 100 KS     17:17 Rhythms - ABCs Cardiac Rhythm - Junctional Rhythm: accelerated junctional rhythm Conduction Defect: right bundle branch block  ABCs - Airway: Clear Breathing: Agonal Pulses: Carotid Present; Femoral Present KS     17:18 Vitals Vitals - Pulse:  129 Heart Rate Source: Monitor Resp: 28 BP: 147/85 Patient Position: Lying SpO2: 100 % Pulse Ox Source: Continuous KS     17:19 Oxygen Therapy Oxygen Therapy - SpO2: 100 % Pulse Oximetry Type: Continuous Flow (L/min): 60 Oxygen Concentration (%): 100 O2 Device (Oxygen Therapy): ventilator KS     17:20 Pain Scale Number Scale - Pain Rating: Activity: 0 KS     17:21 Notifications Notifications - Notified: Family KS     17:22 Code Outcome Outcome - Survival: Yes Code Termination Due to: Return of Spontaneous Circulation Transfer To Critical Care: Other (Comment) (Already in critical care) KS     17:23 Code End Successful ROSC KS     17:23 Staff Departed Shonda Vincent MD (Automatically marked out by Code End event); Dianne Willoughby, ROMERO (Automatically marked out by Code End event); Verena Ortega RN (Automatically marked out by Code End event); Daniela Harrington RN (Automatically marked out by Code End event); Martinez Bruce (Automatically marked out by Code End event) KS        Results for RIANNA PENG (MRN [id number]) as of 12/5/2017 17:56   Ref. Range 12/5/2017 04:50   WBC Latest Ref Range: 3.90 - 12.70 K/uL 9.44   RBC Latest Ref Range: 4.60 - 6.20 M/uL 3.46 (L)   Hemoglobin Latest Ref Range: 14.0 - 18.0 g/dL 8.7 (L)   Hematocrit Latest Ref Range: 40.0 - 54.0 % 27.4 (L)   MCV Latest Ref Range: 82 - 98 fL 79 (L)   MCH Latest Ref Range: 27.0 - 31.0 pg 25.1 (L)   MCHC Latest Ref Range: 32.0 - 36.0 g/dL 31.8 (L)   RDW Latest Ref Range: 11.5 - 14.5 % 18.6 (H)   Platelets Latest Ref Range: 150 - 350 K/uL 66 (L)   MPV Latest Ref Range: 9.2 - 12.9 fL SEE COMMENT   Gran% Latest Ref Range: 38.0 - 73.0 % 78.8 (H)   Gran # Latest Ref Range: 1.8 - 7.7 K/uL 7.4   Lymph% Latest Ref Range: 18.0 - 48.0 % 12.2 (L)   Lymph # Latest Ref Range: 1.0 - 4.8 K/uL 1.2   Mono% Latest Ref Range: 4.0 - 15.0 % 7.5   Mono # Latest Ref Range: 0.3 - 1.0 K/uL 0.7   Eosinophil% Latest Ref Range: 0.0 - 8.0 % 1.2   Eos # Latest Ref Range:  0.0 - 0.5 K/uL 0.1   Basophil% Latest Ref Range: 0.0 - 1.9 % 0.3   Baso # Latest Ref Range: 0.00 - 0.20 K/uL 0.03   Aniso Unknown Slight   Poik Unknown Slight   Poly Unknown Occasional   Platelet Estimate Unknown Decreased (A)   [place] Cells Unknown Occasional   Large/Giant Platelets Unknown Present   Schistocytes Unknown Present     Results for RIANNA PENG (MRN [id number]) as of 12/5/2017 17:56   Ref. Range 12/5/2017 08:12 12/5/2017 13:51 12/5/2017 13:52 12/5/2017 14:47 12/5/2017 17:20   Sodium Latest Ref Range: 136 - 145 mmol/L 139 139   141   Potassium Latest Ref Range: 3.5 - 5.1 mmol/L 5.2 (H) 5.3 (H)   5.8 (H)   Chloride Latest Ref Range: 95 - 110 mmol/L 107 104   106   CO2 Latest Ref Range: 23 - 29 mmol/L 17 (L) 20 (L)   14 (L)   Anion Gap Latest Ref Range: 8 - 16 mmol/L 15 15   21 (H)   BUN, Bld Latest Ref Range: 8 - 23 mg/dL 10 11   11   Creatinine Latest Ref Range: 0.5 - 1.4 mg/dL 1.7 (H) 1.6 (H)   1.8 (H)   eGFR if non African American Latest Ref Range: >60 mL/min/1.73 m^2 41 (A) 45 (A)   39 (A)   eGFR if African American Latest Ref Range: >60 mL/min/1.73 m^2 48 (A) 51 (A)   45 (A)   Glucose Latest Ref Range: 70 - 110 mg/dL 120 (H) 91   130 (H)   Calcium Latest Ref Range: 8.7 - 10.5 mg/dL 8.1 (L) 8.3 (L)   8.2 (L)   Phosphorus Latest Ref Range: 2.7 - 4.5 mg/dL 3.4 3.4   4.7 (H)   Magnesium Latest Ref Range: 1.6 - 2.6 mg/dL 2.3  2.3     Albumin Latest Ref Range: 3.5 - 5.2 g/dL 3.1 (L) 3.2 (L)   3.1 (L)   Lactate, Kem Latest Ref Range: 0.5 - 2.2 mmol/L    5.5 (HH)    Vancomycin, Random Latest Ref Range: Not established ug/mL  11.6        Results for RIANNA PENG (MRN [id number]) as of 12/5/2017 17:56   Ref. Range 12/2/2017 13:51 12/2/2017 13:51 12/2/2017 13:51 12/5/2017 17:29 12/5/2017 17:29   Blood Culture, Routine Unknown No Growth to date No Growth to date No Growth to date     CULTURE, BLOOD Unknown Rpt   Rpt Rpt     Results for RIANNA PENG (MRN [id number]) as of 12/5/2017 17:56   Ref. Range 12/5/2017 11:11  12/5/2017 15:41 12/5/2017 16:17 12/5/2017 17:08 12/5/2017 17:28   POCT Glucose Latest Ref Range: 70 - 110 mg/dL 108 69 (L) 150 (H) 157 (H)    POC Sodium Latest Ref Range: 136 - 145 mmol/L     137   POC Potassium Latest Ref Range: 3.5 - 5.1 mmol/L     5.6 (H)   POC Ionized Calcium Latest Ref Range: 1.06 - 1.42 mmol/L     1.06   POC Hematocrit Latest Ref Range: 36 - 54 %PCV     31 (L)   POC HEMOGLOBIN Latest Units: g/dL     11   POC PH Latest Ref Range: 7.35 - 7.45      7.114 (LL)   POC PCO2 Latest Ref Range: 35 - 45 mmHg     45.4 (H)   POC PO2 Latest Ref Range: 80 - 100 mmHg     191 (H)   POC BE Latest Ref Range: -2 to 2 mmol/L     -15   POC HCO3 Latest Ref Range: 24 - 28 mmol/L     14.6 (L)   POC SATURATED O2 Latest Ref Range: 95 - 100 %     99   POC TCO2 Latest Ref Range: 23 - 27 mmol/L     16 (L)   Sample Unknown     ARTERIAL   Site Unknown     RB     Chest AP single view.  Comparison: 12/4/17.    Support devices overlie the left chest wall.  Right-sided central venous catheter is visualized with distal tip at the caval atrial junction.  Endotracheal tube has been placed with distal tip seen 2.5 cm above the tete.  Postsurgical sternotomy wires are seen with multiple surgical clips visualized over the upper mediastinal region.  Cardiac silhouette is enlarged but stable in size.  Lungs are symmetrically expanded.  No evidence of new focal consolidative process, pneumothorax, or significant effusion.   Impression       No significant change in cardiopulmonary status.      Electronically signed by: CHI CARABALLO MD  Date: 12/05/17  Time: 17:53     Encounter     View Encounter

## 2017-12-05 NOTE — PLAN OF CARE
Pt unresponsive to voice and pain. Pupils pinpoint. Bradycardic on monitor. HR in 50s. Dr. Vincent at bedside. Received orders to give Narcan IV. Pt still remained unresponsive and now in respiratory distress. No pulse palpated. Code blue called. EICU notified. 100% O2 given per ambubag and chest compressions started. See code blue documentation per EICU. Will cont to monitor.

## 2017-12-05 NOTE — PLAN OF CARE
Patient with agitation overnight, pulling at lines and getting out of bed. Patient also told nurse that he has not been getting the right medication. Per nurse, the patient only has these episodes of agitation overnight and is calm throughout the day. Given one time dose of haldol 5mg IV and temporary 2 point soft restraints (wrists) ordered to prevent injury/removal of lines.    Violet Cueva, PGY1  12/05/2017

## 2017-12-05 NOTE — PLAN OF CARE
Problem: Patient Care Overview  Goal: Plan of Care Review  Outcome: Ongoing (interventions implemented as appropriate)  Patient intubated and placed on ventilator as ordered.. Settings on flowsheet.  Alarms checked and audible.  Vent checked.  Resuscitation equipment at bedside.

## 2017-12-05 NOTE — PROGRESS NOTES
[facility] Internal Medicine Resident HO-3 Progress Note    Subjective:      Mr. Bland is oriented to self and place but continually asks if he can go home. He said he just wants to be in his room at home. He does not want to do this (referring to continuous dialysis, ICU care) anymore.     Nursing says he was off of levophed earlier in the night but had to go back on despite turning down the UF to 70 max overnight.     Objective:   Last 24 Hour Vital Signs:  BP  Min: 77/52  Max: 122/63  Temp  Av.8 °F (36.6 °C)  Min: 97.4 °F (36.3 °C)  Max: 98.1 °F (36.7 °C)  Pulse  Av.1  Min: 71  Max: 109  Resp  Av.9  Min: 6  Max: 25  SpO2  Av.7 %  Min: 89 %  Max: 100 %  I/O last 3 completed shifts:  In: 1179.4 [P.O.:500; I.V.:179.4; IV Piggyback:500]  Out: 4347 [Other:4347]    Physical Examination:  Gen: Thin, sitting up in bed, NAD, on CVVHD  HEENT: EOMI, no scleral icterus, Clear conjunctiva  Cv: RRR. Normal S1 and S2, right chest with access and dialysis ongoing  Lungs: CTAB, respirations even and non labored. No rales, crackles, rhonchi, or wheezing  Gi: thin, soft, non tender to palpation. Normoactive bowel sounds  Extrem: warm, with edema of BLE. Right BKA.  Left upper arm sutures clean and without erythema.    Skin: dry, warm, no rashes or breakdown  Neuro: Alert and oriented to self and place, cooperative. No focal deficits.   Psyc: flat affect, childlike demeanor, cooperative    Laboratory:  WBC 9  Hg 8.7  Plt 79    Na 140  K 4.6  BUN 10  Cr 1.7  Gluc 139  Alb 3.0  Phos 4.6    Current Medications:     Infusions:   norepinephrine bitartrate-D5W 0.05 mcg/kg/min (17 0027)        Scheduled:   sodium chloride 0.9%   Intravenous Once    sodium chloride 0.9%   Intravenous Once    atorvastatin  40 mg Oral Daily    heparin (porcine)  5,000 Units Subcutaneous Q12H    insulin aspart  5 Units Subcutaneous TID WM    insulin detemir  14 Units Subcutaneous QHS    levothyroxine  125 mcg Oral Before breakfast     lubiprostone  8 mcg Oral BID    pantoprazole  40 mg Oral Daily    pregabalin  75 mg Oral BID    vancomycin (VANCOCIN) IVPB  1,250 mg Intravenous Q24H        PRN:  sodium chloride 0.9%, acetaminophen, dextrose 50%, dextrose 50%, glucagon (human recombinant), glucose, glucose, hydrocodone-acetaminophen 10-325mg, hydrocodone-acetaminophen 5-325mg, insulin aspart    Assessment:     Williams Bland is a 67 y.o.male with  Patient Active Problem List    Diagnosis Date Noted    Palliative care encounter 12/05/2017    Goals of care, counseling/discussion 12/05/2017    Hypotension     Clotted dialysis access 11/29/2017    Below knee amputation status, right 11/22/2017    Hypothyroidism due to acquired atrophy of thyroid 09/17/2017    Swelling 09/14/2017    Physical deconditioning 07/18/2017    Weakness 07/17/2017    Hyperglycemia 04/19/2017    Fluid overload 04/19/2017    Thrombocytopenia 04/19/2017    Hypertensive emergency 04/19/2017    Acute on chronic systolic heart failure 04/19/2017    Pulmonary hypertension 04/19/2017    NSTEMI (non-ST elevated myocardial infarction) 03/07/2017    Personal history of noncompliance with medical treatment, presenting hazards to health 03/07/2017    Serum potassium elevated 03/11/2016    ESRD on hemodialysis     Essential hypertension     Coronary artery disease involving coronary bypass graft of native heart without angina pectoris 08/29/2015    Volume overload 08/29/2015    Mitral valve replaced 03/26/2015    Anemia 12/08/2014    Hyperlipidemia LDL goal <70 09/18/2014    Hypothyroid 09/18/2014    Uncontrolled type 2 diabetes mellitus with chronic kidney disease on chronic dialysis, with long-term current use of insulin 06/02/2014    Elevated troponin 05/30/2014    Mitral regurgitation 05/08/2014    Coronary artery disease 05/08/2014    Cerebrovascular disease 05/08/2014        Plan:      Cardiogenic Shock due to Volume Overload 2/2 CKD5 and HFrEF  - On  admission, patient missed HD 2/2 clotted fistula  - At home on Coreg, Lisinopril, Imdur, Lasix  - Recommend HD as per Nephrology, currently on CRRT  -Nephro believe his clotting may be due to hypotension during HD (got to 70/32). They have stopped coreg. Patient also not on imdur or lisinopril while currently inpatient.   - on CVVHD with 3.5L off so far and now with improved BP but still on levophed  holding lasix currently  - well known to Dr. Hammond who is consulted  not a candidate for ICD and has a very poor prognosis    AVF Malfunction  - Sent from dialysis for malfunctioning dialysis graft and suspected clot  - 11/30  HD Access: Thrombus fills the majority of the venous outflow of LUE  - Repair as per Vascular Surgery (Dr. Ackerman) 11/30 but then clotted off again during HD attempt  - Vascular Surg 12/1 to declot again and place Parish catheter  Became hypotensive during surgery requiring NS bolus and Lovenox > Transferred to ICU for closer monitoring and initiating of CRRT and Levophed for goal MAP >65  - Per Dr. Ackerman, will need revision of fistula after medically stable in the future      HTN  - Known to Dr. Hammond who says his baseline BP is low  on admission, /52  - Holding home amlodipine, coreg, imdur, lisinopril for levophed s/p HoTN in surgery Friday  - wean today as tolerated    Elevated Procalcitonin  - Procalcitonin elevated at 0.72, lactate 1.7  - consider repeating procal  BCx pending  vanc dose x1  random vanc 9 today  - source could be gluteal cleft induration but will look for additional source if conditions worsens  - WBC 9 today  no further vanc dosing    Gluteal Cleft Induration  - checked by Dr. Ackerman  no current interventions indicated       DM2 with Peripheral Neuropathy  - A1C >14  - Continue on home Levemir 10U qhs & SSI w/ POCT glucose. Added aspart 5 units with meals  - glucose in 250s  will increase to 14 levemir nightly for better control     Hyperkalemia  -  On admission, K 5.7. EKG with no changes from baseline  - on CVVHD with K 5.2 per renal      Hypervolemic Hyperchloremic Hyponatremia  - likely due to volume overload  improved to 137 after fluid off     CAD s/p CABG and Stents  - no chest pain  continue home Aspirin 81mg daily, Plavix 75mg daily, Atorvastatin 40mg daily  - Cholesterol 59, TG 46, HDL 31, LDL 18  - known well to Dr. Hammond who is following     CVA 2013   - Continue on home Aspirin 81mg daily, Plavix 75mg daily, Atorvastatin 40mg daily  - lipid panel as above    PPX  - SQH    Dispo  - Weaning levophed currently  palliative discussion    Shonda Vincent  [facility]  [facility] Service Team B  [phone]    [facility] Medicine Hospitalist Pager numbers:   [facility] Hospitalist Medicine Team A (Krista/Patrizia): [phone]  [facility] Hospital[facility] Medicine Team B (Ivette/Kathi):  [phone]

## 2017-12-05 NOTE — PLAN OF CARE
Mr. Bland coded (became unresponsive and rafael down to 30s with no pulse) around 5:10PM. He received 5-10 minutes of chest compressions. He was intubated and atropine, calcium chloride, insulin, and D50. After intubation his lung exam had bilateral rhonchi. He did not receive any further bolus of fluids. ABG showed pH 7.1 which is assumed to be due to lactate which was elevated a few hours before.     I spoke to both Dr. Torres and Dr. Dennison who both feel that his heart failure is endstage and he is likely dying now. I spoke with his wife who is in the waiting room. I explained the code and that it is likely he may code again given his current labs. She agrees now that she does not want any further chest compressions or escalation of care. I let her know that we will leave him intubated for now and make him comfortable. I let her know that will only wean the levophed as BP allows but we will not go up on the dose should his blood pressure drop again. I told her we will not do any more dialysis. And overall, we will not deescalate care at this time so that we can allow all family members to be contacted and have a chance to spend time with him tonight.     His wife voiced understanding of his very poor prognosis at this time. Comfort was provided and I let her know how to get in touch with the team and me personally.       Shonda Vincent  [facility] IM PGY3  [facility] Medicine Team B  [phone]

## 2017-12-05 NOTE — PROGRESS NOTES
Progress Note  Nephrology      Consult Requested By: Grabiel Armenta MD      SUBJECTIVE:     Overnight events  Patient is a 67 y.o. male     Patient Active Problem List   Diagnosis    Mitral regurgitation    Coronary artery disease    Cerebrovascular disease    Elevated troponin    Uncontrolled type 2 diabetes mellitus with chronic kidney disease on chronic dialysis, with long-term current use of insulin    Hyperlipidemia LDL goal <70    Hypothyroid    Anemia    Mitral valve replaced    Coronary artery disease involving coronary bypass graft of native heart without angina pectoris    Volume overload    Serum potassium elevated    ESRD on hemodialysis    Essential hypertension    NSTEMI (non-ST elevated myocardial infarction)    Personal history of noncompliance with medical treatment, presenting hazards to health    Hyperglycemia    Fluid overload    Thrombocytopenia    Hypertensive emergency    Acute on chronic systolic heart failure    Pulmonary hypertension    Weakness    Physical deconditioning    Swelling    Hypothyroidism due to acquired atrophy of thyroid    Below knee amputation status, right    Clotted dialysis access    Hypotension    Palliative care encounter    Goals of care, counseling/discussion     Past Medical History:   Diagnosis Date    CHF (congestive heart failure)     Coronary artery disease     CVA (cerebral vascular accident) 2013    Diabetes mellitus     ESRD (end stage renal disease)     Hypertension     Stroke               OBJECTIVE:     Vitals:    12/05/17 1200 12/05/17 1215 12/05/17 1230 12/05/17 1245   BP: 100/65 (!) 94/56 (!) 100/58 (!) 99/58   Pulse:       Resp: 13 13 14 15   Temp:       TempSrc:       SpO2: 100% 99% 99% 100%   Weight:       Height:           Temp: 97.8 °F (36.6 °C) (12/05/17 1105)  Pulse: 85 (12/05/17 1145)  Resp: 15 (12/05/17 1245)  BP: (!) 99/58 (12/05/17 1245)  SpO2: 100 % (12/05/17 1245)      Date 12/05/17 0700 - 12/06/17  0659   Shift 2566-5811 2608-0182 3390-1707 24 Hour Total   I  N  T  A  K  E   P.O. 50   50    I.V.  (mL/kg) 22.2  (0.3)   22.2  (0.3)    Shift Total  (mL/kg) 72.2  (0.9)   72.2  (0.9)   O  U  T  P  U  T   Urine  (mL/kg/hr) 0   0    Other 471   471    Shift Total  (mL/kg) 471  (6.1)   471  (6.1)   Weight (kg) 77.5 77.5 77.5 77.5             Medications:   sodium chloride 0.9%   Intravenous Once    sodium chloride 0.9%   Intravenous Once    atorvastatin  40 mg Oral Daily    heparin (porcine)  5,000 Units Subcutaneous Q12H    insulin aspart  5 Units Subcutaneous TID WM    insulin detemir  14 Units Subcutaneous QHS    levothyroxine  125 mcg Oral Before breakfast    lubiprostone  8 mcg Oral BID    pantoprazole  40 mg Oral Daily    pregabalin  75 mg Oral BID    vancomycin (VANCOCIN) IVPB  1,250 mg Intravenous Q24H      norepinephrine bitartrate-D5W 0.05 mcg/kg/min (12/05/17 0027)               Physical Exam:  General appearance:NAD  Lungs: diminished breath sounds  Heart: Pulse 85  Abdomen: soft  Extremities: no edema  Skin: dry  Laboratory:  ABG  Labs reviewed  Recent Results (from the past 336 hour(s))   Basic metabolic panel    Collection Time: 11/27/17  5:09 PM   Result Value Ref Range    Sodium 123 (L) 136 - 145 mmol/L    Potassium 4.3 3.5 - 5.1 mmol/L    Chloride 89 (L) 95 - 110 mmol/L    CO2 19 (L) 23 - 29 mmol/L    BUN, Bld 61 (H) 8 - 23 mg/dL    Creatinine 6.5 (H) 0.5 - 1.4 mg/dL    Calcium 8.1 (L) 8.7 - 10.5 mg/dL    Anion Gap 15 8 - 16 mmol/L     Recent Results (from the past 336 hour(s))   CBC auto differential    Collection Time: 12/05/17  4:50 AM   Result Value Ref Range    WBC 9.44 3.90 - 12.70 K/uL    Hemoglobin 8.7 (L) 14.0 - 18.0 g/dL    Hematocrit 27.4 (L) 40.0 - 54.0 %    Platelets 66 (L) 150 - 350 K/uL   CBC auto differential    Collection Time: 12/04/17  5:25 AM   Result Value Ref Range    WBC 8.20 3.90 - 12.70 K/uL    Hemoglobin 8.8 (L) 14.0 - 18.0 g/dL    Hematocrit 27.7 (L) 40.0 - 54.0  %    Platelets 87 (L) 150 - 350 K/uL   CBC auto differential    Collection Time: 12/03/17 12:42 PM   Result Value Ref Range    WBC 6.47 3.90 - 12.70 K/uL    Hemoglobin 8.8 (L) 14.0 - 18.0 g/dL    Hematocrit 26.7 (L) 40.0 - 54.0 %    Platelets 80 (L) 150 - 350 K/uL     Urinalysis  No results for input(s): COLORU, CLARITYU, SPECGRAV, PHUR, PROTEINUA, GLUCOSEU, BILIRUBINCON, BLOODU, WBCU, RBCU, BACTERIA, MUCUS, NITRITE, LEUKOCYTESUR, UROBILINOGEN, HYALINECASTS in the last 24 hours.    Diagnostic Results:  X-Ray: Reviewed  US: Reviewed  Echo: Reviewed  ACCESS    ASSESSMENT/PLAN:     ESRD  K 5.2  Metabolic acidosis  MBD  Anemia Hb 8.7  BP 99/58  CVVHD

## 2017-12-05 NOTE — PLAN OF CARE
Mr. Bland's BP was dropping while on CVVHD at UF of 70. Renal stopped the UF but BP continued to worsen. The levophed was turned from 0.05 to 0.2. CVVHD was turned off and he was started on a 500cc bolus as he started having bigeminy and lots of ectopy per nursing during that episode of hypotension.     His BP is now stable on 0.1 of levophed. He had some hypoglycemia to 69 earlier for which he was given D50 with good response. But in the setting of worsening hypotension and hypoglycemia, will give stress dose steroids now and start vanc and zosyn. I have ordered blood cultures and a cortisol stat.     Of note, Mr. Bland has been saying that he wants to go home and refusing certain aspects of his care during this time including lab draws at this moment. However, his wife is at the bedside insisting everything be done. I have contacted Dr. Peters who is following this case to update her on this.      Shonda Vincent  [facility] IM PGY3  [facility] Medicine Team B  [phone]

## 2017-12-06 NOTE — PHYSICIAN QUERY
"PT Name: Williams Bland  MR #: [id number]    Physician Query Form - Hematology Clarification      CDS/: Mary Marie RN, CCDS               Contact information: [phone]    This form is a permanent document in the medical record.      Query Date: December 6, 2017    By submitting this query, we are merely seeking further clarification of documentation. Please utilize your independent clinical judgment when addressing the question(s) below.    The Medical record contains the following:   Indicators  Supporting Clinical Findings Location in Medical Record   x "Anemia" documented Anemia - at goal, no need for epo today   Nephro PN 12/2   x H & H = OIn 11/29 = 12.6/36.6  On 12/02  = 10.7/31.2  On 12/04  =  8.8/26.7  On 12/05  =  8.7/27/4  On 12/06  =  9.4/29.1   Lab    BP =                     HR=      "GI bleeding" documented      Acute bleeding (Non GI site)      Transfusion(s)      Treatment:     x Other:  DMT2  ESRD on HD  HTN     IRON 46 04/19/2017     TIBC 186 (L) 04/19/2017     FERRITIN 1,866 (H) 04/19/2017     [id number] 07/18/2017     FOLATE 6.8 07/18/2017      H&P          H&P     Provider, please specify diagnosis or diagnoses associated with above clinical findings.     Please further specify "anemia".     [  ] Chronic blood loss anemia     [ X ] Anemia of chronic disease ( Specify chronic disease)      [ X ] CKD (specify stage) ___________________________     [  ] Other (Specify) _______________________________     [  ] Clinically Undetermined     [  ] Other Hematological Diagnosis (please specify): _________________________________    [  ] Clinically Undetermined       Please document in your progress notes daily for the duration of treatment, until resolved, and include in your discharge summary.                                                                                                      "

## 2017-12-06 NOTE — ANESTHESIA PROCEDURE NOTES
Intubation    Diagnosis: respiratory arrest  Doctor requesting consult: eladia  Patient location during procedure: ICU  Procedure start time: 12/5/2017 5:04 PM  Timeout: 12/5/2017 5:03 PM  Procedure end time: 12/5/2017 6:06 PM  Staffing  Anesthesiologist: PAPI QUEEN  Performed: anesthesiologist   Anesthesiologist was present at the time of the procedure.  Preanesthetic Checklist  Completed: patient identified, site marked, surgical consent, pre-op evaluation, timeout performed, IV checked, risks and benefits discussed, monitors and equipment checked and anesthesia consent given  Intubation  Indication: respiratory failure, airway protection, hypoxemia mask ventilation: easy mask.  Intubation: n/a (pt unresponsive on my arrival), laryngoscopy direct, Aguilar 3.  Endotracheal Tube: oral, 8.0 mm ID, cuffed (inflated to minimal occlusive pressure)  Attempts: 1, Grade II - cords partially seen  Complicating Factors: none  Tube secured at 21 cm at the lips.  Findings post-intubation: bilateral breath sounds, positive ETCO2, atraumatic / condition of teeth unchanged  Position Confirmation: auscultation (and etco2)

## 2017-12-06 NOTE — PLAN OF CARE
Problem: Patient Care Overview  Goal: Plan of Care Review  Outcome: Ongoing (interventions implemented as appropriate)  Patient on  with documented settings.  Alarms are set and functioning with adequate volumes.  AMBU bag and mask at bedside. Will continue to monitor.

## 2017-12-06 NOTE — PLAN OF CARE
Problem: Patient Care Overview  Goal: Plan of Care Review  Outcome: Ongoing (interventions implemented as appropriate)  Pt lying in bed resting comfortably, aroused to voice and gentle touch. Respirations even and unlabored. SATS 100% on current vent settings with FiO2 40%. Tele Sinus tach 130's at beginning of shift and trended down to 90's. Afebrile. Unable to titrate levophed off. Light sedation achieved on fentanyl at 100 mcg. No acute events overnight. Discussed plan of care with attending team and spouse. Wife does not want to titrate up on levophed. Would like to keep patient comfortable and understand DNR. Will continue to monitor.

## 2017-12-06 NOTE — PLAN OF CARE
"  Williams Bland was identified as being in soft 2 point restraints within 24 hours of expiration.  This patient has been entered in the Restraint Mortality Log on 12/6/2017 8:06am."    Amy Edmonds RN  "

## 2017-12-06 NOTE — PHYSICIAN QUERY
"PT Name: Williams Bland  MR #: [id number]    Physician Query Form - Nutrition Clarification     CDS/: Mary Marie RN, CCDS             Contact information: [phone]    This form is a permanent document in the medical record.     Query Date: December 6, 2017    By submitting this query, we are merely seeking further clarification of documentation.. Please utilize your independent clinical judgment when addressing the question(s) below.    The Medical record contains the following:   Indicators  Supporting Clinical Findings Location in Medical Record   x % of Estimated Energy Intake over a time frame from p.o., TF, or TPN Ht: 5' 11"  Wt: 70kg  BMI: 21 VS Flow Sheet         x Weight Status over a time frame Weight loss    Cards CN 12/2   x Subcutaneous Fat and/or Muscle Loss Cardiac Cachexia Cards CN 12/2    Fluid Accumulation or Edema      Reduced  Strength weak Cards CN 12/2    Wt / BMI / Usual Body Weight      Delayed Wound Healing / Failure to Thrive     x Acute or Chronic Illness DMT2 uncontrolled  ESRD  Metabolic acidosis  MBD  CVVHD    Cardiogenic Shock d/t volume overload 2/2 ckd 5 & HFrEF  HTN  AVF malnfunction  Elevated Procalcitonin  DM2 w/peripheral neuropathy  Hyperkalemia  Hypervolemic  Hyperchloremic  Hyponatremia  CAD s/p stents and cabg  CVA 2013   Nepho PN 12/2  Dennison PN 12/5                  Vincent Pn 12/5   x Medication Ensure at home Plan of Care Note 12/5   x Treatment     x Other Poor nutrition Dennison Pn 12/4     AND / ASPEN Clinical Characteristics (October 2011)  A minimum of two characteristics is recommended for diagnosing either moderate or severe malnutrition   Mild Malnutrition Moderate Malnutrition Severe Malnutrition   Energy Intake from p.o., TF or TPN. < 75% intake of estimated energy needs for less than 7 days < 75% intake of estimated energy needs for greater than 7 days < 50% intake of estimated energy needs for > 5 days   Weight Loss 1-2% in 1 month  5% in 3 months  7.5% " in 6 months  10% in 1 year 1-2 % in 1 week  5% in 1 month  7.5% in 3 months  10% in 6 months  20% in 1 year > 2% in 1 week  > 5% in 1 month  > 7.5% in 3 months  > 10% in 6 months  > 20% in 1 year   Physical Findings     None *Mild subcutaneous fat and/or muscle loss  *Mild fluid accumulation  *Stage II decubitus  *Surgical wound or non-healing wound *Mod/severe subcutaneous fat and/or muscle loss  *Mod/severe fluid accumulation  *Stage III or IV decubitus  *Non-healing surgical wound     Provider, please specify diagnosis or diagnoses associated with above clinical findings.    [X ] Mild Protein-Calorie Malnutrition  [ ] Cachexia  [ ] Other Nutritional Diagnosis (please specify): ____________________________________  [ ] Other: ________________________________  [ ] Clinically Undetermined    Please document in your progress notes daily for the duration of treatment until resolved and include in your discharge summary.

## 2017-12-06 NOTE — PROGRESS NOTES
"Palliative Care Daily Progress Note   Notified by primary team that patient is rapidly decompensating and coded around 5:10 pm and wife wanting everything done.      Palliative Care met with patient and cousin by bedside. Patient is s/p code and intubated. Cardiologist Dr. Torres and nephrologist Dr. Dennison noted heart failure is end stage and patient is likely dying.   Discussed with patient's Spouse - Robyn who was in the waiting room and she is now acceptable that patient is dying and stated she just want patient comfortable and re-affirmed code status as DNR.   Robyn recalled patient had warned her last week stating " I feel something will happen but remember one thing... that I love you".  Family by bedside now waiting for patient's daughter from [place] to arrive. Emotional comfort provided    Discussed with primary team -Dr. Vincent, and noted plan is not to deescalate care for now to allow family members time to come in and spend some time. Present care will be evaluated in AM.     Recommendations:  Continue present limited supportive care   Code status: DNR    Palliative care will continue to provide family with emotional support         Lakeshia Michael MD, MPH    Palliative Care Team   [phone]    "

## 2017-12-06 NOTE — PLAN OF CARE
Patient's family was at bedside early in the night.  Again discussed patient's condition with his wife and daughter.  Confirmed that he is DNR/DNI and we would not be escalating life-sustaining measures, including Levophed for BP support.  Daughter remained at bedside and was updated throughout the night.  Patient was started on Fentanyl and appeared comfortable on multiple exams throughout the night.  BP slowly trended down and around 6:45AM developed frequent PVCs with more significant hypotension.  Called to bedside around 7:10 after patient noted to be in asystole on cardiac monitor.  No palpable pulse.  No heart or breath sounds auscultated.  No pupillary response.  Time of death 7:15AM.  Patient's daughter remained at bedside, was updated, and all questions answered.  Full death summary to follow.    Verena Richardson MD  [facility] Team B

## 2017-12-06 NOTE — CHAPLAIN
Patient passed away this morning.  Nurse called when family arrived.  They were peaceful with the passing of Mr Bland.  I provided a group prayer, spoke of our julio, and encouraged family to continue to sing as they were when I entered.I also gave wife a condolence card and guide book.  She gave me the phone number of the  Home.  Offer support to the staff.

## 2017-12-06 NOTE — PROGRESS NOTES
Patient condition is guarded and critical, unlikely to survive hospitalization.  [facility] will continue to follow for any needs.  Nabil Rinaldi RN  Transitional Navigator/Case Management  [phone]

## 2017-12-06 NOTE — PHYSICIAN QUERY
PT Name: Williams Bland  MR #: [id number]    Physician Query Form - Relationship to Procedure Clarification     CDS/: Mary Marie RN, CCDS               Contact information: [phone]    This form is a permanent document in the medical record.     Query Date: December 6, 2017      By submitting this query, we are merely seeking further clarification of documentation. Please utilize your independent clinical judgment when addressing the question(s) below.    The Medical record contains the following:  Supporting Clinical Findings Location in Medical Record   AV Graft Clot  - Sent from dialysis for malfunctioning dialysis graft and suspected clot    - Vascular Surg 12/1 to declot again and place Parish catheter  Became hypotensive during surgery requiring NS bolus and Lovenox > Transferred to ICU for closer monitoring and initiating of CRRT and Levophed for goal MAP >65   Richardson Pn 12/2   Cardiogenic Shock due to Volume Overload 2/2 CKD5 and HFrEF  - On admission, patient missed HD 2/2 clotted fistula  - At home on Coreg, Lisinopril, Imdur, Lasix  - Recommend HD as per Nephrology, currently on CRRT  - on CVVHD with 3.5L off so far and now with improved BP but still on levophed  holding lasix currently   Vincent Pn 12/5               HTN - hypotensive, stop Coreg  Albumin PRN hypotension    Nephro believe his clotting may be due to hypotension during HD (got to 70/32). They have stopped coreg. Patient also not on imdur or lisinopril while currently inpatient.  Kim Pn 11/30      B. Patrizia Pn 12/1   Procedure(s) (LRB):  Thrombectomy (Left)  Arm av fistula   Anesthesia: Local MAC    OPERATION:  Declotting AV fistula, left upper arm plus insertion of right   subclavian Parish catheter.   Op Note 11/29          Op Note 12/1 11/29/2017 1902 Quick Note     Initial blood pressure are from the calf (JR). Marked drops with small dose etomidate occurred without any evident change in consciousness or  "orientation so question relevance to systemic BP.  BP cuff changed to R upper arm.  Initial pressures also low, but very quickly into normal range without any additional change in consciousness.  Pt complaining that OR is too cold => warm blanket around head.  Also, since R AC 20ga running poorly except with significant retraction pressure, new R EJ 20ga started (good backflow).    Anesthesia Chart       Please clarify if "Hypotension during surgery" is    [  ] Inherent/Integral to procedure (Thrombectomy 11/29/17)  [  ] Routine outcome  [  ] Incidental finding  [  ] Complication of procedure  [x  ] Clinically insignificant  [  ] Clinically undetermined    Patient had been hypotensive throughout admission and again was found to be hypotensive during surgery. He later even required pressors for blood pressure support. They hypotension was not due to the surgery or even related to the surgery. It was part of the patient's clinical picture before, during, and after surgery.     "

## 2017-12-06 NOTE — PROGRESS NOTES
"Vancomycin Dosing and Monitoring Pharmacy Protocol    Williams Bland is a 67 y.o. male    Height: 5' 11" (1.803 m)   Wt Readings from Last 1 Encounters:   12/04/17 77.5 kg (170 lb 13.7 oz)     Ideal body weight: 75.3 kg (166 lb 0.1 oz)  Adjusted ideal body weight: 76.2 kg (167 lb 15.1 oz)    Temp Readings from Last 3 Encounters:   12/05/17 98.6 °F (37 °C) (Oral)   11/29/17 98.8 °F (37.1 °C)   11/28/17 96.3 °F (35.7 °C) (Oral)      Lab Results   Component Value Date/Time    WBC 16.53 (H) 12/05/2017 05:29 PM    WBC 9.44 12/05/2017 04:50 AM    WBC 8.20 12/04/2017 05:25 AM      Lab Results   Component Value Date/Time    CREATININE 1.7 (H) 12/05/2017 05:29 PM    CREATININE 1.8 (H) 12/05/2017 05:20 PM    CREATININE 1.6 (H) 12/05/2017 01:51 PM        Serum creatinine: 1.7 mg/dL High 12/05/17 1729  Estimated creatinine clearance: 46.1 mL/min    Antibiotics       Start     Stop Route Frequency Ordered    12/06/17 1600  vancomycin (VANCOCIN) 1,500 mg in dextrose 5 % 250 mL IVPB      -- IV Every 24 hours (non-standard times) 12/06/17 0321          Antifungals       None            Microbiology Results (last 7 days)       Procedure Component Value Units Date/Time    Blood culture [[id number]] Collected:  12/02/17 1351    Order Status:  Completed Specimen:  Blood from Peripheral, Hand, Right Updated:  12/05/17 2212     Blood Culture, Routine No Growth to date     Blood Culture, Routine No Growth to date     Blood Culture, Routine No Growth to date     Blood Culture, Routine No Growth to date    Blood culture [[id number]] Collected:  12/02/17 1351    Order Status:  Completed Specimen:  Blood from Peripheral, Hand, Right Updated:  12/05/17 2212     Blood Culture, Routine No Growth to date     Blood Culture, Routine No Growth to date     Blood Culture, Routine No Growth to date     Blood Culture, Routine No Growth to date    Blood culture [[id number]] Collected:  12/05/17 1729    Order Status:  Sent Specimen:  Blood Updated:  12/05/17 "     Blood culture [[id number]] Collected:  17    Order Status:  Sent Specimen:  Blood Updated:  17    Clostridium difficile EIA [[id number]] Collected:  17    Order Status:  Completed Specimen:  Stool from Stool Updated:  17     C. diff Antigen Negative     C difficile Toxins A+B, EIA Negative     Comment: Testing not recommended for children <24 months old.               Indication:   bacteremia     Target Level: 15-20 mcg/ml    Hemodialysis:   No on N/A    Dosing Weight:   ABW--actual body weight  If ABW is greater than or equal to 30% over Ideal Body Weight, AdjBW will be used to calculate vancomycin dose.    Last Vancomycin dose: 1250 mg   Date/Time given: 17 1610          Vancomycin level:  No results for input(s): VANCOMYCIN-TROUGH in the last 72 hours.  Recent Labs   Lab Result Units  17   1351   Vancomycin, Random ug/mL  11.6       Per Protocol Initial/Adjustments Dosin. Initial/Adjustment Dose: INCREASE Vancomycin will be adjusted from 1250mg q24hr to 1500mg q24hr  2. Vancomycin Trough Level will be drawn on 17 1530 date/time    Pharmacy will continue to follow.    Please contact if you have any further questions. Thank you.    Xenia Danielson, PharmD  [phone]

## 2017-12-07 LAB
BACTERIA BLD CULT: NORMAL
BACTERIA BLD CULT: NORMAL

## 2017-12-10 LAB
BACTERIA BLD CULT: NORMAL
BACTERIA BLD CULT: NORMAL

## 2017-12-13 NOTE — DISCHARGE SUMMARY
[facility] Internal [facility] Discharge Summary    Primary Team: [facility] Internal [facility] Team B  Attending Physician: Grabiel Armenta  Resident: Shonda Vincent  Intern: Martha Acharya    Date of Admit: 2017  Date of Discharge: 2017    Discharge to:    Condition:      Discharge Diagnoses     Patient Active Problem List   Diagnosis    Mitral regurgitation    Coronary artery disease    Cerebrovascular disease    Elevated troponin    Uncontrolled type 2 diabetes mellitus with chronic kidney disease on chronic dialysis, with long-term current use of insulin    Hyperlipidemia LDL goal <70    Hypothyroid    Anemia    Mitral valve replaced    Coronary artery disease involving coronary bypass graft of native heart without angina pectoris    Volume overload    Serum potassium elevated    ESRD on hemodialysis    Essential hypertension    NSTEMI (non-ST elevated myocardial infarction)    Personal history of noncompliance with medical treatment, presenting hazards to health    Hyperglycemia    Fluid overload    Thrombocytopenia    Hypertensive emergency    Acute on chronic systolic heart failure    Pulmonary hypertension    Weakness    Physical deconditioning    Swelling    Hypothyroidism due to acquired atrophy of thyroid    Below knee amputation status, right    Clotted dialysis access    Hypotension    Palliative care encounter    Goals of care, counseling/discussion       Consultants and Procedures     Consultants:  Cardiology  Wound care  Surgery  Nephrology    Procedures:   Declotted fistula  HD    Brief History of Present Illness      Williams Bland is a 67 y.o.  male who  has a past medical history of CHF (congestive heart failure); Coronary artery disease; CVA (cerebral vascular accident) (); Diabetes mellitus; ESRD (end stage renal disease); Hypertension; and Stroke.. The patient presented to the [facility] on 2017 with a primary complaint  of Vascular Access Malfunction x 1 days.     Patient was in his usual state of health (lives at assisted living, wheel chair bound) until 1 day day ago when he preseted to HD and was told that his R arm graft was clotted. He was told to present to the ED for evaluation by vascular surgery. Patient states he missed 2 days of HD total.Upon arrival to the ED, patient was taken to OR where thrombectomy of the graft was performed. Denies fever, chills, chest pain, abdominal pain, N/V, SOB, dizziness, dysuria, hematuria, syncope.    Hospital Course By Problem with Pertinent Findings     Cardiogenic Shock due to Volume Overload 2/2 CKD5 and HFrEF  On admission, patient missed HD 2/2 clotted fistula. Surgery attempted to declot but upon resumption of HD, it reclotted. Renal was consulted and felt that fistula was continually clotting due to hypotension in HD despite midodrine. He was moved to the ICU and was started on CRRT for the 3 days. He was covered with empiric antibiotics to rule out infectious etiology of shock although blood cultures never grew anything. Cardiology was consulted who was very familiar with his case. They felt that this was endstage heart failure and that prognosis is very poor. Levophed was used during that time to keep BP up. He eventually became more lethargic, asking to be stopped. Family discussion was had with he and wife who was POA. Palliative care was consulted and discussions about discontinuing all care as was Mr. Bland's wish. However before that time came, he became bradycardic and then went into ventricular fibrillation. He was coded for 10 minutes and sinus rhythm was obtained. During the code he was intubated and at that time, his wife decided to make him comfort care which included leaving the tube in and turning down the levophed as his BP would tolerate, but not to escalate the pressors or do chest compression/shocks should he code again. The next morning he became bradycardic  again and time of death was called at 7:45AM with family at bedside.     Discharge Medications           Discharge Information:   Diet:       Physical Activity:       Instructions:       Follow-Up Appointments:       Shonda Vincent  [facility] Internal Medicine, [facility]

## 2018-01-06 PROBLEM — E87.1 HYPONATREMIA: Status: ACTIVE | Noted: 2018-01-06

## 2019-07-23 NOTE — PT/OT/SLP EVAL
Physical Therapy  Evaluation    Williams Bland   MRN: [id number]   Admitting Diagnosis: ESRD on hemodialysis    PT Received On: 07/17/17  PT Start Time: 1548     PT Stop Time: 1606    PT Total Time (min): 18 min       Billable Minutes:  Evaluation 18    Diagnosis: ESRD on hemodialysis    Past Medical History:   Diagnosis Date    CHF (congestive heart failure)     Coronary artery disease     CVA (cerebral vascular accident) 2013    Diabetes mellitus     ESRD (end stage renal disease)     Hypertension     Stroke       Past Surgical History:   Procedure Laterality Date    CARDIAC SURGERY      PTCA WITH STENT PLACEMENT    THYROID SURGERY         Referring physician: Dr. Acharya  Date referred to PT: 7/17/2017    General Precautions: Standard,    Orthopedic Precautions:   none  Braces:     none    Do you have any cultural, spiritual, Baptism conflicts, given your current situation?: none reported to PT    Patient History:  Equipment Currently Used at Home: shower chair, walker, rolling, rollator  DME owned (not currently used): none    Previous Level of Function:  Ambulation Skills: needs device  Transfer Skills: needs device  ADL Skills: needs device    Subjective:  Communicated with ROMERO Contreras prior to session.  Pt reports he is doing fine at this time.  Chief Complaint: none reported  Patient goals: To return home    Pain/Comfort  Pain Rating Post-Intervention 1: 0/10      Objective:   Patient found with: telemetry     Cognitive Exam:  Oriented to: Person, Place, Time (cues required to report month), Situation    Follows Commands/attention: Follows one-step commands  Communication: clear/fluent  Safety awareness/insight to disability: impaired    Physical Exam:  Postural examination/scapula alignment: Rounded shoulder and Head forward    Skin integrity: apparent callous of R tibia and R lateral knee likely 2/2 prosthesis  Edema: None noted BLE    Sensation:   Impaired  L foot    Upper Extremity Range of  Motion:  See OT Eval    Upper Extremity Strength:  See OT Eval    Lower Extremity Range of Motion:  Right Lower Extremity: WFL  Left Lower Extremity: WFL    Lower Extremity Strength:  Right Lower Extremity: hip 3+/5  Left Lower Extremity: Deficits: ankle DF 1/5, knee flex/ext 4-/5, hip flex 3+/5    Functional Mobility:  Bed Mobility:  Rolling/Turning Right: Modified independent  Supine to Sit: Modified Independent, With side rail  Sit to Supine: Independent    Transfers:  Sit <> Stand Assistance: Contact Guard Assistance  Sit <> Stand Assistive Device: Rolling Walker    Gait:   Gait Distance: Pt ambulated ~50 ft with RW with RLE prosthesis donned and completed with CGA initially then progressed to SBA. Pt demo left toe drag with limited L ankle DF and flexed posture over RW.  Assistance 1: Stand by Assistance, Contact Guard Assistance  Gait Assistive Device: Rolling walker  Gait Deviation(s): decreased wayne, increased time in double stance, toe drag    Stairs:  Not assessed    Balance:   Static Sit: NORMAL: No deviations seen in posture held statically  Dynamic Sit: NORMAL: No deviations seen in posture held dynamically  Static Stand: FAIR+: Takes MINIMAL challenges from all directions  Dynamic stand: FAIR+: Needs CLOSE SUPERVISION during gait and is able to right self with minor LOB    Therapeutic Activities and Exercises:  Evaluation completed this date    AM-PAC 6 CLICK MOBILITY  How much help from another person does this patient currently need?   1 = Unable, Total/Dependent Assistance  2 = A lot, Maximum/Moderate Assistance  3 = A little, Minimum/Contact Guard/Supervision  4 = None, Modified [place]/Independent    Turning over in bed (including adjusting bedclothes, sheets and blankets)?: 4  Sitting down on and standing up from a chair with arms (e.g., wheelchair, bedside commode, etc.): 3  Moving from lying on back to sitting on the side of the bed?: 4  Moving to and from a bed to a chair (including a  wheelchair)?: 3  Need to walk in hospital room?: 3  Climbing 3-5 steps with a railing?: 2  Total Score: 19     AM-PAC Raw Score CMS G-Code Modifier Level of Impairment Assistance   6 % Total / Unable   7 - 9 CM 80 - 100% Maximal Assist   10 - 14 CL 60 - 80% Moderate Assist   15 - 19 CK 40 - 60% Moderate Assist   20 - 22 CJ 20 - 40% Minimal Assist   23 CI 1-20% SBA / CGA   24 CH 0% Independent/ Mod I     Patient left HOB elevated with call button in reach, bed alarm on and RN notified.    Assessment:   Williams Bland is a 67 y.o. male with a medical diagnosis of ESRD on hemodialysis and presents with impaired LE strength and gait deviations from previous R BKA and L sided weakness from past CVA. Pt will benefit from skilled PT during his IP stay to address his limitations.    Rehab identified problem list/impairments: Rehab identified problem list/impairments: weakness, impaired functional mobilty, gait instability, decreased lower extremity function    Rehab potential is good.    Activity tolerance: Good    Discharge recommendations: Discharge Facility/Level Of Care Needs: home     Barriers to discharge: Barriers to Discharge: None    Equipment recommendations: Equipment Needed After Discharge: none     GOALS:    Physical Therapy Goals        Problem: Physical Therapy Goal    Goal Priority Disciplines Outcome Goal Variances Interventions   Physical Therapy Goal     PT/OT, PT Ongoing (interventions implemented as appropriate)     Description:  Goals to be met by: 17     Patient will increase functional independence with mobility by performin. Supine <> sit with [place]  2. Sit to stand transfer with Modified [place]  3. Bed to chair transfer with Modified [place] using Rolling Walker  4. Gait  x 150 feet with Modified [place] using Rolling Walker.                       PLAN:    Patient to be seen 5 x/week to address the above listed problems via gait training, therapeutic  activities, therapeutic exercises  Plan of Care expires: 08/17/17  Plan of Care reviewed with: patient    Functional Assessment Tool Used: AMPAC  Score: 19  Functional Limitation: Mobility: Walking and moving around  Mobility: Walking and Moving Around Current Status (): CK  Mobility: Walking and Moving Around Goal Status (): LIDIA Bauer, PT  07/17/2017       normal...

## 2020-06-19 NOTE — CONSULTS
Nephrology Consult  H&P      Consult Requested By: No att. providers found  Reason for Consult: LUBNA    SUBJECTIVE:     History of Present Illness:  Patient is a 67 y.o. male was seen in my office yesterday and sent to Ed for nonhealing wounds.    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 Kidney bx shoved stable chronic HTN and diabetic glomerulopathy. 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 [facility] Pt is not following with me and i last seen him right before he started HD in Jan 2016  HE does not know who is his current nephrologist    Review of Systems   Constitutional: Negative for chills, diaphoresis, fever and weight loss.   Eyes: Negative for blurred vision and double vision.   Respiratory: Positive for shortness of breath. Negative for cough.    Cardiovascular: Positive for orthopnea. Negative for chest pain.   Gastrointestinal: Negative for abdominal pain, heartburn, nausea and vomiting.   Genitourinary: Negative for dysuria, frequency and urgency.   Musculoskeletal: Negative for joint pain and myalgias.   Skin: Negative for itching and rash.   Neurological: Negative for dizziness, tingling, tremors, weakness and headaches.   Endo/Heme/Allergies: Negative for environmental allergies. Does not bruise/bleed easily.   Psychiatric/Behavioral: Negative for depression.       Past Medical History:   Diagnosis Date    CHF (congestive heart failure)     Coronary artery disease     CVA  A nurse will call patient to initiate transitional care management phone call.   Patient was discharged from [facility] on 6/18/20.    Per hospital discharge summary, patient is to:  Follow up with:  -IV Daptomycin as scheduled. Has infusion 6/19/20 at 11 AM, IV antibiotics scheduled 6/19-6/30/20.     -Dr. Cuellar on 6/24/20 at 9 AM. COVID 19 test performed 6/15/20; test negative    -Dr. Moran on 7/2/20 at 11:30 AM       New or changed medications: IV Vancomycin      Admit date: 6/14/20  Discharge date: 6/18/20  Diagnosis:    COVID 19 test performed 6/15/20; test negative    Sepsis due to MSSA Bacteremia   History of IV drug use    Back pain:  likely MSK strain          (cerebral vascular accident) 2013    Diabetes mellitus     ESRD (end stage renal disease)     Hypertension     Stroke      Past Surgical History:   Procedure Laterality Date    CARDIAC SURGERY      PTCA WITH STENT PLACEMENT    THYROID SURGERY       Family History   Problem Relation Age of Onset    Hypertension Father      Social History   Substance Use Topics    Smoking status: Former Smoker     Quit date: 9/22/2000    Smokeless tobacco: Never Used    Alcohol use No       Review of patient's allergies indicates:  No Known Allergies         OBJECTIVE:     Vital Signs (Most Recent)  Vitals:    04/19/17 1600 04/19/17 1630 04/19/17 1640 04/19/17 1700   BP: (!) 164/69 136/65 (!) 158/69 (!) 170/89   BP Location: Right arm Right arm Right arm Right arm   Patient Position: Lying Lying Lying Lying   BP Method: Automatic Automatic Automatic Automatic   Pulse: 63 62 63    Resp:   18    Temp:   98 °F (36.7 °C)    TempSrc:   Oral    SpO2:       Weight:       Height:               Date 04/19/17 0700 - 04/20/17 0659(Discharged)   Shift 8398-0492 2334-7986 6822-3424 24 Hour Total   I  N  T  A  K  E   P.O. 350   350    I.V.  (mL/kg) 0.4  (0)   0.4  (0)    Other  400  400    Shift Total  (mL/kg) 350.4  (4.6) 400  (5.2)  750.4  (9.8)   O  U  T  P  U  T   Other  2400  2400    Shift Total  (mL/kg)  2400  (31.3)  2400  (31.3)   Weight (kg) 76.8 76.8 76.8 76.8           Medications:   sodium chloride 0.9%   Intravenous Once    amlodipine  5 mg Oral Daily    aspirin  81 mg Oral Daily    atorvastatin  40 mg Oral QHS    carvedilol  25 mg Oral BID WM    clopidogrel  75 mg Oral Daily    heparin (porcine)  5,000 Units Subcutaneous Q8H    insulin aspart  5 Units Subcutaneous TIDWM    insulin detemir  10 Units Subcutaneous QHS    isosorbide mononitrate  60 mg Oral Daily    levothyroxine  125 mcg Oral Daily    lisinopril  20 mg Oral Daily    pantoprazole  40 mg Oral Daily    sevelamer carbonate  800 mg Oral TID WM            Physical Exam   Constitutional: He is oriented to person, place, and time and well-developed, well-nourished, and in no distress. No distress.   HENT:   Head: Atraumatic.   Mouth/Throat: Oropharynx is clear and moist.   Eyes: EOM are normal. No scleral icterus.   Neck: Neck supple. No JVD present.   Cardiovascular: Normal rate, regular rhythm and intact distal pulses.  Exam reveals no friction rub.    Murmur heard.  Pulmonary/Chest: Effort normal. No respiratory distress. He has no wheezes. He has no rales.   Abdominal: Soft. Bowel sounds are normal. He exhibits no distension. There is no tenderness.   Musculoskeletal: He exhibits no edema.   RBKA   Neurological: He is alert and oriented to person, place, and time.   Skin: Skin is warm and dry. No rash noted. No erythema.   2 wounds - picture below               Laboratory:    Recent Labs  Lab 04/19/17  0551   WBC 9.97   HGB 10.5*   HCT 32.2*   *   MONO 6.0       Recent Labs  Lab 04/19/17  0550 04/19/17  1043 04/19/17  1450   * 132* 134*   K 4.4 4.5 3.5   CL 98 98 101   CO2 22* 24 25   BUN 44* 47* 30*   CREATININE 4.2* 4.2* 3.0*   CALCIUM 8.5* 8.1* 8.1*   PHOS 5.1*  --   --        Diagnostic Results:  X-Ray: Reviewed    Echo: 3/8/17 Reviewed  1 - Moderately depressed left ventricular systolic function (EF 35-40%).     2 - Eccentric hypertrophy.     3 - Mildly depressed right ventricular systolic function .     4 - Pulmonary hypertension. The estimated PA systolic pressure is 88 mmHg.   ASSESSMENT/PLAN:     1. ESRD - HD MWF. Sherlyn boateng???  Plan for Hd today  3. Anemia - at goal, no need for epogen  today   4. MBD - PTH at goal, cont Ergo weekly  5. Hyperuricemia - on allopurinol  6. Nutrition - nepro  7. HTN - resume Coreg, norvasc and lisinopril        Thank you for consult, will follow  With any question please call [phone]  Ebony Alvarez    [facility]  RAVI Cadet MD, SKYLER SERRANO MD,   MD ASPEN [place] VAlexei  ZEESH[address] [place]  [address]  [place], [address]  [phone]

## 2023-03-31 NOTE — ASSESSMENT & PLAN NOTE
-last echo with PA pressure 18mmHg down from 80s 9 months ago  -echo with severely depressed LV function and mild RV dysfunction with severe LAE  -echo findings consistent with biventricular failure; recommend conservative medical management with volume removal and feel overall long term prognosis is poor   April 2, 2023      Hemalatha Handy  [address]  [facility] [address]        Dear ,    We are writing to inform you of your test results.    Your test results are reassuring aside from some blood in your urine. We should recheck this in 2 weeks. If you continue to have blood in your urine, we should have you see urology. Please call [address] [place] [address] or your local [facility] to schedule a lab only visit to leave a sample.      Resulted Orders   CBC with platelets   Result Value Ref Range    WBC Count 4.2 4.0 - 11.0 10e3/uL    RBC Count 4.39 3.80 - 5.20 10e6/uL    Hemoglobin 14.0 11.7 - 15.7 g/dL    Hematocrit 42.7 35.0 - 47.0 %    MCV 97 78 - 100 fL    MCH 31.9 26.5 - 33.0 pg    MCHC 32.8 31.5 - 36.5 g/dL    RDW 13.1 10.0 - 15.0 %    Platelet Count 188 150 - 450 10e3/uL   Comprehensive metabolic panel (BMP + Alb, Alk Phos, ALT, AST, Total. Bili, TP)   Result Value Ref Range    Sodium 142 133 - 144 mmol/L    Potassium 3.9 3.4 - 5.3 mmol/L    Chloride 111 (H) 94 - 109 mmol/L    Carbon Dioxide (CO2) 28 20 - 32 mmol/L    Anion Gap 3 3 - 14 mmol/L    Urea Nitrogen 19 7 - 30 mg/dL    Creatinine 0.64 0.52 - 1.04 mg/dL    Calcium 8.9 8.5 - 10.1 mg/dL    Glucose 106 (H) 70 - 99 mg/dL    Alkaline Phosphatase 78 40 - 150 U/L    AST 16 0 - 45 U/L    ALT 17 0 - 50 U/L    Protein Total 6.8 6.8 - 8.8 g/dL    Albumin 3.7 3.4 - 5.0 g/dL    Bilirubin Total 0.5 0.2 - 1.3 mg/dL    GFR Estimate 88 >60 mL/min/1.73m2      Comment:      eGFR calculated using 2021 CKD-EPI equation.   Lipase   Result Value Ref Range    Lipase 129 73 - 393 U/L   UA Macro with Reflex to Micro and Culture - lab collect   Result Value Ref Range    Color Urine Yellow Colorless, Straw, Light Yellow, Yellow    Appearance Urine Clear Clear    Glucose Urine Negative Negative mg/dL    Bilirubin Urine Negative Negative    Ketones Urine Negative Negative mg/dL    Specific Gravity Urine >=1.030 1.003 - 1.035    Blood Urine  Moderate (A) Negative    pH Urine 5.5 5.0 - 7.0    Protein Albumin Urine Negative Negative mg/dL    Urobilinogen Urine 0.2 0.2, 1.0 E.U./dL    Nitrite Urine Negative Negative    Leukocyte Esterase Urine Negative Negative   UA Microscopic with Reflex to Culture   Result Value Ref Range    RBC Urine 5-10 (A) 0-2 /HPF /HPF    WBC Urine 0-5 0-5 /HPF /HPF    Squamous Epithelials Urine Few (A) None Seen /LPF    Mucus Urine Present (A) None Seen /LPF    Calcium Oxalate Crystals Urine Moderate (A) None Seen /HPF    Narrative    Urine Culture not indicated       If you have any questions or concerns, please call the clinic at the number listed above.       Sincerely,      EDINSON Lilly

## (undated) DEVICE — SUT 2-0 ETHILON 18 FS

## (undated) DEVICE — ADAPTER CATH SYRINGE

## (undated) DEVICE — GLOVE BIOGEL ECLIPSE SZ 7.5

## (undated) DEVICE — SYR 30CC LUER LOCK

## (undated) DEVICE — COVER PROBE 6X48

## (undated) DEVICE — SHEET THYROID W/ISO-BAC

## (undated) DEVICE — SEE MEDLINE ITEM 157131

## (undated) DEVICE — SYR SAFETY 1CC 25G X 5/8 E

## (undated) DEVICE — DEVICE PICC SECURE SORBA VIEW

## (undated) DEVICE — STOCKINET TUBULAR 1 PLY 6X60IN

## (undated) DEVICE — SEE MEDLINE ITEM 157116

## (undated) DEVICE — TUBING ARTRL PRESS MNTR M-F 4

## (undated) DEVICE — DRESSING XEROFORM FOIL PK 1X8

## (undated) DEVICE — SUT PROLENE 5-0 36IN C-1

## (undated) DEVICE — SUT PDSII 4-0 PS-2 CLEAR MO

## (undated) DEVICE — CATH FOGARTY EMB 4FR 40CM

## (undated) DEVICE — SPONGE DERMACEA GAUZE 4X4

## (undated) DEVICE — BLADE SURG CARBON STEEL SZ11

## (undated) DEVICE — COVER OVERHEAD SURG LT BLUE

## (undated) DEVICE — PAD PREP 50/CA

## (undated) DEVICE — SEE MEDLINE ITEM 156955

## (undated) DEVICE — DRESSING TRANS 4X4 3/4

## (undated) DEVICE — TAPE MEDIPORE 4IN X 2YDS

## (undated) DEVICE — SUT PROLENE 6-0 24 BV-1

## (undated) DEVICE — SEE MEDLINE ITEM 157117

## (undated) DEVICE — INSTRUMENT SUCTION FRAZIER 12F

## (undated) DEVICE — SYR B-D DISP CONTROL 10CC100/C

## (undated) DEVICE — SEE MEDLINE ITEM 157173

## (undated) DEVICE — HEMOSTAT SURGICEL 4X8IN

## (undated) DEVICE — PACK BASIC

## (undated) DEVICE — SUT VICRYL 3-0 27 SH

## (undated) DEVICE — GAUZE SPONGE 4X4 12PLY

## (undated) DEVICE — SET DECANTER MEDICHOICE

## (undated) DEVICE — SUT ETHILON 4-0 PS2 18 BLK

## (undated) DEVICE — TIE VAS SIL RUBBER MINI WHT ST

## (undated) DEVICE — ELECTRODE REM PLYHSV RETURN 9

## (undated) DEVICE — DRAPE C-ARM/MOBILE XRAY 44X80

## (undated) DEVICE — SET DILATOR COAXIAL 4F

## (undated) DEVICE — GAUZE SPONGE 4'X4 12 PLY

## (undated) DEVICE — MANIFOLD 4 PORT

## (undated) DEVICE — DRESSING ANTIMICROBIAL 1 INCH

## (undated) DEVICE — BOOT SUTURE AID

## (undated) DEVICE — JELLY LUBRICANT STERILE 4 OZ

## (undated) DEVICE — NDL HYPO A BEVEL 22X1 1/2

## (undated) DEVICE — TIE VAS SIL RUBBER MAXI BLU ST

## (undated) DEVICE — APPLICATOR CHLORAPREP ORN 26ML

## (undated) DEVICE — SEE MEDLINE ITEM 152622

## (undated) DEVICE — SYR 10CC LUER LOCK